# Patient Record
Sex: FEMALE | Race: WHITE | Employment: OTHER | URBAN - METROPOLITAN AREA
[De-identification: names, ages, dates, MRNs, and addresses within clinical notes are randomized per-mention and may not be internally consistent; named-entity substitution may affect disease eponyms.]

---

## 2017-01-10 ENCOUNTER — OFFICE VISIT (OUTPATIENT)
Dept: FAMILY MEDICINE CLINIC | Age: 72
End: 2017-01-10

## 2017-01-10 VITALS
HEIGHT: 63 IN | OXYGEN SATURATION: 96 % | WEIGHT: 143.5 LBS | SYSTOLIC BLOOD PRESSURE: 120 MMHG | HEART RATE: 72 BPM | DIASTOLIC BLOOD PRESSURE: 70 MMHG | BODY MASS INDEX: 25.43 KG/M2

## 2017-01-10 DIAGNOSIS — E55.9 VITAMIN D DEFICIENCY: ICD-10-CM

## 2017-01-10 DIAGNOSIS — F32.A DEPRESSION, UNSPECIFIED DEPRESSION TYPE: ICD-10-CM

## 2017-01-10 DIAGNOSIS — M81.0 OSTEOPOROSIS: ICD-10-CM

## 2017-01-10 DIAGNOSIS — Z00.00 WELL ADULT EXAM: Primary | ICD-10-CM

## 2017-01-10 DIAGNOSIS — K21.9 GASTROESOPHAGEAL REFLUX DISEASE WITHOUT ESOPHAGITIS: ICD-10-CM

## 2017-01-10 LAB
A/G RATIO: 1.9 (ref 1.1–2.2)
ALBUMIN SERPL-MCNC: 4.9 G/DL (ref 3.4–5)
ALP BLD-CCNC: 72 U/L (ref 40–129)
ALT SERPL-CCNC: 21 U/L (ref 10–40)
ANION GAP SERPL CALCULATED.3IONS-SCNC: 16 MMOL/L (ref 3–16)
AST SERPL-CCNC: 20 U/L (ref 15–37)
BASOPHILS ABSOLUTE: 0.1 K/UL (ref 0–0.2)
BASOPHILS RELATIVE PERCENT: 0.9 %
BILIRUB SERPL-MCNC: 0.5 MG/DL (ref 0–1)
BUN BLDV-MCNC: 14 MG/DL (ref 7–20)
CALCIUM SERPL-MCNC: 9.7 MG/DL (ref 8.3–10.6)
CHLORIDE BLD-SCNC: 100 MMOL/L (ref 99–110)
CHOLESTEROL, TOTAL: 197 MG/DL (ref 0–199)
CO2: 27 MMOL/L (ref 21–32)
CREAT SERPL-MCNC: 0.6 MG/DL (ref 0.6–1.2)
EOSINOPHILS ABSOLUTE: 0.1 K/UL (ref 0–0.6)
EOSINOPHILS RELATIVE PERCENT: 1.4 %
GFR AFRICAN AMERICAN: >60
GFR NON-AFRICAN AMERICAN: >60
GLOBULIN: 2.6 G/DL
GLUCOSE BLD-MCNC: 108 MG/DL (ref 70–99)
HCT VFR BLD CALC: 47.7 % (ref 36–48)
HDLC SERPL-MCNC: 50 MG/DL (ref 40–60)
HEMOGLOBIN: 15.7 G/DL (ref 12–16)
LDL CHOLESTEROL CALCULATED: 106 MG/DL
LYMPHOCYTES ABSOLUTE: 2.1 K/UL (ref 1–5.1)
LYMPHOCYTES RELATIVE PERCENT: 23.3 %
MCH RBC QN AUTO: 30.5 PG (ref 26–34)
MCHC RBC AUTO-ENTMCNC: 32.9 G/DL (ref 31–36)
MCV RBC AUTO: 92.8 FL (ref 80–100)
MONOCYTES ABSOLUTE: 0.7 K/UL (ref 0–1.3)
MONOCYTES RELATIVE PERCENT: 7.7 %
NEUTROPHILS ABSOLUTE: 6 K/UL (ref 1.7–7.7)
NEUTROPHILS RELATIVE PERCENT: 66.7 %
PDW BLD-RTO: 12.6 % (ref 12.4–15.4)
PLATELET # BLD: 291 K/UL (ref 135–450)
PMV BLD AUTO: 8.8 FL (ref 5–10.5)
POTASSIUM SERPL-SCNC: 4.4 MMOL/L (ref 3.5–5.1)
RBC # BLD: 5.14 M/UL (ref 4–5.2)
SODIUM BLD-SCNC: 143 MMOL/L (ref 136–145)
TOTAL PROTEIN: 7.5 G/DL (ref 6.4–8.2)
TRIGL SERPL-MCNC: 203 MG/DL (ref 0–150)
VLDLC SERPL CALC-MCNC: 41 MG/DL
WBC # BLD: 9 K/UL (ref 4–11)

## 2017-01-10 PROCEDURE — 93000 ELECTROCARDIOGRAM COMPLETE: CPT | Performed by: FAMILY MEDICINE

## 2017-01-10 PROCEDURE — G0439 PPPS, SUBSEQ VISIT: HCPCS | Performed by: FAMILY MEDICINE

## 2017-01-11 LAB — VITAMIN D 25-HYDROXY: 56.5 NG/ML

## 2017-01-23 ENCOUNTER — HOSPITAL ENCOUNTER (OUTPATIENT)
Dept: GENERAL RADIOLOGY | Age: 72
Discharge: HOME | End: 2017-01-23
Attending: FAMILY MEDICINE | Admitting: FAMILY MEDICINE

## 2017-01-23 DIAGNOSIS — M81.0 AGE-RELATED OSTEOPOROSIS WITHOUT CURRENT PATHOLOGICAL FRACTURE: ICD-10-CM

## 2017-01-23 DIAGNOSIS — M81.0 OSTEOPOROSIS: ICD-10-CM

## 2017-01-25 RX ORDER — ALENDRONATE SODIUM 70 MG/1
70 TABLET ORAL
Qty: 4 TABLET | Refills: 5 | Status: SHIPPED | OUTPATIENT
Start: 2017-01-25 | End: 2017-01-26 | Stop reason: SDUPTHER

## 2017-01-26 ENCOUNTER — TELEPHONE (OUTPATIENT)
Dept: FAMILY MEDICINE CLINIC | Age: 72
End: 2017-01-26

## 2017-01-26 RX ORDER — ALENDRONATE SODIUM 70 MG/1
TABLET ORAL
Qty: 4 TABLET | Refills: 5 | Status: SHIPPED | OUTPATIENT
Start: 2017-01-26 | End: 2019-02-07 | Stop reason: CLARIF

## 2017-05-12 RX ORDER — OMEPRAZOLE 20 MG/1
CAPSULE, DELAYED RELEASE ORAL
Qty: 90 CAPSULE | Refills: 0 | Status: SHIPPED | OUTPATIENT
Start: 2017-05-12 | End: 2017-08-14 | Stop reason: SDUPTHER

## 2017-07-31 ENCOUNTER — OFFICE VISIT (OUTPATIENT)
Dept: SURGERY | Age: 72
End: 2017-07-31

## 2017-07-31 VITALS
BODY MASS INDEX: 25.66 KG/M2 | HEIGHT: 63 IN | DIASTOLIC BLOOD PRESSURE: 64 MMHG | TEMPERATURE: 99.7 F | SYSTOLIC BLOOD PRESSURE: 128 MMHG | OXYGEN SATURATION: 95 % | HEART RATE: 79 BPM | WEIGHT: 144.8 LBS | RESPIRATION RATE: 16 BRPM

## 2017-07-31 DIAGNOSIS — Z41.1 ELECTIVE PROCEDURE FOR UNACCEPTABLE COSMETIC APPEARANCE: Primary | ICD-10-CM

## 2017-07-31 PROCEDURE — 99203 OFFICE O/P NEW LOW 30 MIN: CPT | Performed by: SURGERY

## 2017-07-31 ASSESSMENT — ENCOUNTER SYMPTOMS
BACK PAIN: 0
PHOTOPHOBIA: 0
COUGH: 1
CONSTIPATION: 0
ABDOMINAL PAIN: 0
SHORTNESS OF BREATH: 0
SORE THROAT: 0
BLURRED VISION: 0
VOMITING: 0
NAUSEA: 0
WHEEZING: 0
DIARRHEA: 0
HEARTBURN: 1
DOUBLE VISION: 0

## 2017-08-09 ENCOUNTER — OFFICE VISIT (OUTPATIENT)
Dept: SURGERY | Age: 72
End: 2017-08-09

## 2017-08-09 VITALS
SYSTOLIC BLOOD PRESSURE: 151 MMHG | OXYGEN SATURATION: 95 % | HEIGHT: 63 IN | BODY MASS INDEX: 25.52 KG/M2 | RESPIRATION RATE: 70 BRPM | WEIGHT: 144 LBS | TEMPERATURE: 97.4 F | DIASTOLIC BLOOD PRESSURE: 86 MMHG

## 2017-08-09 DIAGNOSIS — Z41.1 ELECTIVE PROCEDURE FOR UNACCEPTABLE COSMETIC APPEARANCE: Primary | ICD-10-CM

## 2017-08-09 PROCEDURE — 99999 PR OFFICE/OUTPT VISIT,PROCEDURE ONLY: CPT | Performed by: SURGERY

## 2017-08-14 RX ORDER — OMEPRAZOLE 20 MG/1
CAPSULE, DELAYED RELEASE ORAL
Qty: 90 CAPSULE | Refills: 0 | Status: SHIPPED | OUTPATIENT
Start: 2017-08-14 | End: 2017-11-15 | Stop reason: SDUPTHER

## 2017-09-07 ENCOUNTER — HOSPITAL ENCOUNTER (OUTPATIENT)
Dept: MAMMOGRAPHY | Age: 72
Discharge: HOME | End: 2017-09-07
Attending: FAMILY MEDICINE | Admitting: FAMILY MEDICINE

## 2017-09-07 DIAGNOSIS — Z12.31 VISIT FOR SCREENING MAMMOGRAM: ICD-10-CM

## 2017-09-08 ENCOUNTER — HOSPITAL ENCOUNTER (OUTPATIENT)
Dept: MAMMOGRAPHY | Age: 72
Discharge: HOME | End: 2017-09-08

## 2017-09-08 DIAGNOSIS — R92.8 ABNORMAL FINDING ON MAMMOGRAPHY: ICD-10-CM

## 2017-09-08 DIAGNOSIS — R92.8 ABNORMAL MAMMOGRAM: ICD-10-CM

## 2017-09-11 ENCOUNTER — HOSPITAL ENCOUNTER (OUTPATIENT)
Dept: ULTRASOUND IMAGING | Age: 72
Discharge: HOME | End: 2017-09-11
Attending: FAMILY MEDICINE | Admitting: FAMILY MEDICINE

## 2017-09-11 DIAGNOSIS — R92.8 ABNORMAL MAMMOGRAM: ICD-10-CM

## 2017-09-11 DIAGNOSIS — R92.8 ABNORMAL MAMMOGRAPHY: ICD-10-CM

## 2017-09-12 ENCOUNTER — TELEPHONE (OUTPATIENT)
Dept: FAMILY MEDICINE CLINIC | Age: 72
End: 2017-09-12

## 2017-09-13 ENCOUNTER — OFFICE VISIT (OUTPATIENT)
Dept: FAMILY MEDICINE CLINIC | Age: 72
End: 2017-09-13

## 2017-09-13 VITALS
WEIGHT: 145.6 LBS | OXYGEN SATURATION: 95 % | SYSTOLIC BLOOD PRESSURE: 110 MMHG | HEART RATE: 93 BPM | DIASTOLIC BLOOD PRESSURE: 70 MMHG | HEIGHT: 63 IN | BODY MASS INDEX: 25.8 KG/M2

## 2017-09-13 DIAGNOSIS — F32.5 MAJOR DEPRESSION, SINGLE EPISODE, IN COMPLETE REMISSION (HCC): ICD-10-CM

## 2017-09-13 DIAGNOSIS — C50.512 MALIGNANT NEOPLASM OF LOWER-OUTER QUADRANT OF LEFT FEMALE BREAST (HCC): ICD-10-CM

## 2017-09-13 DIAGNOSIS — R05.9 COUGH: ICD-10-CM

## 2017-09-13 PROCEDURE — 99213 OFFICE O/P EST LOW 20 MIN: CPT | Performed by: FAMILY MEDICINE

## 2017-09-13 ASSESSMENT — PATIENT HEALTH QUESTIONNAIRE - PHQ9
SUM OF ALL RESPONSES TO PHQ9 QUESTIONS 1 & 2: 0
SUM OF ALL RESPONSES TO PHQ QUESTIONS 1-9: 0
1. LITTLE INTEREST OR PLEASURE IN DOING THINGS: 0
2. FEELING DOWN, DEPRESSED OR HOPELESS: 0

## 2017-09-13 ASSESSMENT — ENCOUNTER SYMPTOMS
COUGH: 1
RHINORRHEA: 0
HEMOPTYSIS: 0
SHORTNESS OF BREATH: 0
SORE THROAT: 0
WHEEZING: 0
HEARTBURN: 0

## 2017-09-14 ENCOUNTER — HOSPITAL ENCOUNTER (OUTPATIENT)
Dept: OTHER | Age: 72
Discharge: HOME | End: 2017-09-14
Attending: FAMILY MEDICINE | Admitting: FAMILY MEDICINE

## 2017-09-14 DIAGNOSIS — R05.9 COUGH: ICD-10-CM

## 2017-09-15 ENCOUNTER — OFFICE VISIT (OUTPATIENT)
Dept: SURGERY | Age: 72
End: 2017-09-15

## 2017-09-15 VITALS
WEIGHT: 145 LBS | SYSTOLIC BLOOD PRESSURE: 129 MMHG | DIASTOLIC BLOOD PRESSURE: 76 MMHG | BODY MASS INDEX: 25.69 KG/M2 | HEIGHT: 63 IN | HEART RATE: 96 BPM

## 2017-09-15 DIAGNOSIS — Z71.89 ENCOUNTER FOR SURGICAL COUNSELING: ICD-10-CM

## 2017-09-15 DIAGNOSIS — Z98.890 STATUS POST LEFT BREAST BIOPSY: ICD-10-CM

## 2017-09-15 DIAGNOSIS — C50.512 MALIGNANT NEOPLASM OF LOWER-OUTER QUADRANT OF LEFT FEMALE BREAST (HCC): Primary | ICD-10-CM

## 2017-09-15 DIAGNOSIS — Z76.89 ESTABLISHING CARE WITH NEW DOCTOR, ENCOUNTER FOR: ICD-10-CM

## 2017-09-15 PROCEDURE — 99205 OFFICE O/P NEW HI 60 MIN: CPT | Performed by: SURGERY

## 2017-09-15 PROCEDURE — 99354 PR PROLONGED SVC OUTPATIENT SETTING 1ST HOUR: CPT | Performed by: SURGERY

## 2017-09-25 ENCOUNTER — TELEPHONE (OUTPATIENT)
Dept: SURGERY | Age: 72
End: 2017-09-25

## 2017-09-25 ENCOUNTER — HOSPITAL ENCOUNTER (OUTPATIENT)
Dept: MRI IMAGING | Age: 72
Discharge: HOME | End: 2017-09-25
Attending: SURGERY | Admitting: SURGERY

## 2017-09-25 DIAGNOSIS — C50.512 MALIGNANT NEOPLASM OF LOWER-OUTER QUADRANT OF LEFT FEMALE BREAST (HCC): ICD-10-CM

## 2017-10-11 ENCOUNTER — OFFICE VISIT (OUTPATIENT)
Dept: FAMILY MEDICINE CLINIC | Age: 72
End: 2017-10-11

## 2017-10-11 VITALS
SYSTOLIC BLOOD PRESSURE: 130 MMHG | WEIGHT: 146.9 LBS | OXYGEN SATURATION: 95 % | HEART RATE: 113 BPM | BODY MASS INDEX: 26.03 KG/M2 | DIASTOLIC BLOOD PRESSURE: 70 MMHG | HEIGHT: 63 IN

## 2017-10-11 DIAGNOSIS — C50.512 MALIGNANT NEOPLASM OF LOWER-OUTER QUADRANT OF LEFT FEMALE BREAST (HCC): ICD-10-CM

## 2017-10-11 DIAGNOSIS — Z01.818 PRE-OP EXAM: ICD-10-CM

## 2017-10-11 LAB
ANION GAP SERPL CALCULATED.3IONS-SCNC: 15 MMOL/L (ref 3–16)
BASOPHILS ABSOLUTE: 0.1 K/UL (ref 0–0.2)
BASOPHILS RELATIVE PERCENT: 0.8 %
BUN BLDV-MCNC: 15 MG/DL (ref 7–20)
CALCIUM SERPL-MCNC: 9.8 MG/DL (ref 8.3–10.6)
CHLORIDE BLD-SCNC: 101 MMOL/L (ref 99–110)
CO2: 27 MMOL/L (ref 21–32)
CREAT SERPL-MCNC: 0.8 MG/DL (ref 0.6–1.2)
EOSINOPHILS ABSOLUTE: 0.2 K/UL (ref 0–0.6)
EOSINOPHILS RELATIVE PERCENT: 1.6 %
GFR AFRICAN AMERICAN: >60
GFR NON-AFRICAN AMERICAN: >60
GLUCOSE BLD-MCNC: 88 MG/DL (ref 70–99)
HCT VFR BLD CALC: 44.1 % (ref 36–48)
HEMOGLOBIN: 14.9 G/DL (ref 12–16)
LYMPHOCYTES ABSOLUTE: 2.8 K/UL (ref 1–5.1)
LYMPHOCYTES RELATIVE PERCENT: 26.8 %
MCH RBC QN AUTO: 31.2 PG (ref 26–34)
MCHC RBC AUTO-ENTMCNC: 33.9 G/DL (ref 31–36)
MCV RBC AUTO: 92.1 FL (ref 80–100)
MONOCYTES ABSOLUTE: 0.9 K/UL (ref 0–1.3)
MONOCYTES RELATIVE PERCENT: 9.1 %
NEUTROPHILS ABSOLUTE: 6.4 K/UL (ref 1.7–7.7)
NEUTROPHILS RELATIVE PERCENT: 61.7 %
PDW BLD-RTO: 12.5 % (ref 12.4–15.4)
PLATELET # BLD: 276 K/UL (ref 135–450)
PMV BLD AUTO: 8.3 FL (ref 5–10.5)
POTASSIUM SERPL-SCNC: 4.4 MMOL/L (ref 3.5–5.1)
RBC # BLD: 4.78 M/UL (ref 4–5.2)
SODIUM BLD-SCNC: 143 MMOL/L (ref 136–145)
WBC # BLD: 10.4 K/UL (ref 4–11)

## 2017-10-11 PROCEDURE — 93000 ELECTROCARDIOGRAM COMPLETE: CPT | Performed by: FAMILY MEDICINE

## 2017-10-11 PROCEDURE — 99214 OFFICE O/P EST MOD 30 MIN: CPT | Performed by: FAMILY MEDICINE

## 2017-10-11 NOTE — PROGRESS NOTES
Subjective:      Era Olivas is a 74 y.o. female who presents to the office today for a preoperative consultation at the request of surgeon Dr Dakota Pastor who plans on performing lumpectomy on October 26. This consultation is requested for the specific conditions prompting preoperative evaluation (i.e. because of potential affect on operative risk): none. Planned anesthesia is General.  The patient and family have no known anesthesia issues nor bleeding risks.    Past Medical History:   Diagnosis Date    Vernon's esophagus     Colon polyp 1/15/2016    Gallstones     GERD (gastroesophageal reflux disease)     Hypertension     Osteoporoses     Pregnancies     2- vaginal deliveries- 2    Tubular adenoma nos      Patient Active Problem List    Diagnosis Date Noted    Pre-op exam 10/11/2017    Malignant neoplasm of lower-outer quadrant of left female breast ([address] 09/13/2017    Cough 09/13/2017    Depression 11/18/2016    Well adult exam 01/15/2016    Colon polyp 01/15/2016    Lumbar radiculopathy 11/06/2012    Osteoporosis 09/30/2011    GERD (gastroesophageal reflux disease) 10/14/2010    Vernon esophagus 10/14/2010    Balance disorder 10/14/2010     Past Surgical History:   Procedure Laterality Date    APPENDECTOMY      BLADDER REPAIR  9/09    cah    CHOLECYSTECTOMY  1988    COLONOSCOPY  2008    polyp    HYSTERECTOMY      UPPER GASTROINTESTINAL ENDOSCOPY  2008     Family History   Problem Relation Age of Onset    Breast Cancer Mother     Coronary Art Dis Mother     Cirrhosis Mother     Cancer Mother 67     breast    Heart Disease Mother     Other Father      cva    Stroke Father     Coronary Art Dis Sister     Elevated Lipids Sister     Other Sister      CABG    High Blood Pressure Sister     High Cholesterol Sister     Heart Disease Sister     High Blood Pressure Sister     Cancer Sister 61     breast     Social History     Social History    Marital status:  Spouse name: N/A    Number of children: 2    Years of education: N/A     Occupational History    retired teacher's aid      Social History Main Topics    Smoking status: Never Smoker    Smokeless tobacco: Never Used    Alcohol use 0.6 oz/week     1 Glasses of wine per week      Comment: occasional    Drug use: No    Sexual activity: Yes     Partners: Male     Other Topics Concern    None     Social History Narrative    Exercise 3-4x weekly    Happily --both kids out of town--7 grandchildren    + seatbelts    [place]--sews and reads     Current Outpatient Prescriptions   Medication Sig Dispense Refill    omeprazole (PRILOSEC) 20 MG delayed release capsule TAKE ONE CAPSULE BY MOUTH ONCE DAILY 90 capsule 0    sertraline (ZOLOFT) 50 MG tablet TAKE ONE TABLET BY MOUTH ONCE DAILY 30 tablet 5    Psyllium (METAMUCIL PO) Take by mouth      calcium carbonate 600 MG TABS tablet Take 1 tablet by mouth daily      Potassium Gluconate 595 MG CAPS Take by mouth      Cholecalciferol (VITAMIN D-3) 1000 UNITS CAPS Take by mouth      Ascorbic Acid (VITAMIN C) 250 MG tablet Take 250 mg by mouth daily.  aspirin 81 MG tablet Take 81 mg by mouth daily.  Multiple Vitamin (MULTIVITAMIN PO) Take  by mouth.  alendronate (FOSAMAX) 70 MG tablet Take with water on an empty stomach- wait 30 minutes before eating or taking other meds. Avoid lying down for 30 minutes after dose. 4 tablet 5     No current facility-administered medications for this visit.       Current Outpatient Prescriptions on File Prior to Visit   Medication Sig Dispense Refill    omeprazole (PRILOSEC) 20 MG delayed release capsule TAKE ONE CAPSULE BY MOUTH ONCE DAILY 90 capsule 0    sertraline (ZOLOFT) 50 MG tablet TAKE ONE TABLET BY MOUTH ONCE DAILY 30 tablet 5    Psyllium (METAMUCIL PO) Take by mouth      calcium carbonate 600 MG TABS tablet Take 1 tablet by mouth daily      Potassium Gluconate 595 MG CAPS Take by mouth      Cholecalciferol (VITAMIN D-3) 1000 UNITS CAPS Take by mouth      Ascorbic Acid (VITAMIN C) 250 MG tablet Take 250 mg by mouth daily.  aspirin 81 MG tablet Take 81 mg by mouth daily.  Multiple Vitamin (MULTIVITAMIN PO) Take  by mouth.  alendronate (FOSAMAX) 70 MG tablet Take with water on an empty stomach- wait 30 minutes before eating or taking other meds. Avoid lying down for 30 minutes after dose. 4 tablet 5     No current facility-administered medications on file prior to visit.       Allergies   Allergen Reactions    Sulfa Antibiotics     Sulfamethoxazole-Trimethoprim Other (See Comments)    Vicodin [Hydrocodone-Acetaminophen] Nausea And Vomiting     Review of Systems  Constitutional: negative  Eyes: negative  Ears, nose, mouth, throat, and face: negative  Respiratory: negative  Cardiovascular: negative  Gastrointestinal: negative  Genitourinary:negative  Integument/breast: negative  Hematologic/lymphatic: negative  Musculoskeletal:negative  Neurological: negative  Behavioral/Psych: negative  Endocrine: negative  Allergic/Immunologic: negative      Objective:      /70   Pulse 113   Ht 5' 3\" (1.6 m)   Wt 146 lb 14.4 oz (66.6 kg)   SpO2 95%   BMI 26.02 kg/m²     General Appearance:  Alert, cooperative, no distress, appears stated age   Head:  Normocephalic, without obvious abnormality, atraumatic   Eyes:  PERRL, conjunctiva/corneas clear, EOM's intact, fundi benign, both eyes   Ears:  Normal TM's and external ear canals, both ears   Nose: Nares normal, septum midline,mucosa normal, no drainage or sinus tenderness   Throat: Lips, mucosa, and tongue normal; teeth and gums normal   Neck: Supple, symmetrical, trachea midline, no adenopathy;  thyroid: not enlarged, symmetric, no tenderness/mass/nodules; no carotid bruit or JVD   Back:   Symmetric, no curvature, ROM normal, no CVA tenderness   Lungs:   Clear to auscultation bilaterally, respirations unlabored   Breasts:  No masses or tenderness   Heart:  Regular rate and rhythm, S1 and S2 normal, no murmur, rub, or gallop   Abdomen:   Soft, non-tender, bowel sounds active all four quadrants,  no masses, no organomegaly   Pelvic: Deferred   Extremities: Extremities normal, atraumatic, no cyanosis or edema   Pulses: 2+ and symmetric   Skin: Skin color, texture, turgor normal, no rashes or lesions   Lymph nodes: Cervical, supraclavicular, and axillary nodes normal   Neurologic: Normal           Predictors of intubation difficulty:   Morbid obesity? no   Anatomically abnormal facies? no   Prominent incisors? no   Receding mandible? no   Short, thick neck? no   Neck range of motion: normal   Mallampati score: I (soft palate, uvula, fauces, tonsillar pillars visible)   Thyromental distance: < 6cm   Mouth openincm   Dentition: No chipped, loose, or missing teeth. Cardiographics  ECG: normal sinus rhythm, no blocks or conduction defects, no ischemic changes  Echocardiogram: not done        Lab Review   No visits with results within 2 Month(s) from this visit.    Latest known visit with results is:   Office Visit on 01/10/2017   Component Date Value    Cholesterol, Total 01/10/2017 197     Triglycerides 01/10/2017 203*    HDL 01/10/2017 50     LDL Calculated 01/10/2017 106*    VLDL CHOLESTEROL CALCULA* 01/10/2017 41     Sodium 01/10/2017 143     Potassium 01/10/2017 4.4     Chloride 01/10/2017 100     CO2 01/10/2017 27     Anion Gap 01/10/2017 16     Glucose 01/10/2017 108*    BUN 01/10/2017 14     CREATININE 01/10/2017 0.6     GFR Non- 01/10/2017 >60     GFR  01/10/2017 >60     Calcium 01/10/2017 9.7     Total Protein 01/10/2017 7.5     Alb 01/10/2017 4.9     Albumin/Globulin Ratio 01/10/2017 1.9     Total Bilirubin 01/10/2017 0.5     Alkaline Phosphatase 01/10/2017 72     ALT 01/10/2017 21     AST 01/10/2017 20     Globulin 01/10/2017 2.6     WBC 01/10/2017 9.0     RBC 01/10/2017 5.14     Hemoglobin 01/10/2017 15.7     Hematocrit 01/10/2017 47.7     MCV 01/10/2017 92.8     MCH 01/10/2017 30.5     MCHC 01/10/2017 32.9     RDW 01/10/2017 12.6     Platelets 35/96/6312 291     MPV 01/10/2017 8.8     Neutrophils % 01/10/2017 66.7     Lymphocytes % 01/10/2017 23.3     Monocytes % 01/10/2017 7.7     Eosinophils % 01/10/2017 1.4     Basophils % 01/10/2017 0.9     Neutrophils # 01/10/2017 6.0     Lymphocytes # 01/10/2017 2.1     Monocytes # 01/10/2017 0.7     Eosinophils # 01/10/2017 0.1     Basophils # 01/10/2017 0.1     Vit D, 25-Hydroxy 01/11/2017 56.5          Assessment:        74 y.o. female with planned surgery as above. Known risk factors for perioperative complications: None    Difficulty with intubation is not anticipated. Cardiac Risk Estimation: per the Revised Cardiac Risk Index -low risk    Current medications which may produce withdrawal symptoms if withheld perioperatively: none       Plan:      1. Preoperative workup as follows none  2. Change in medication regimen before surgery: none, continue med regimen including morning of surgery, w/sip of water  3. Prophylaxis for cardiac events with perioperative beta-blockers: not indicated  4. Invasive hemodynamic monitoring perioperatively: not indicated  5.  Deep vein thrombosis prophylaxis postoperatively:regimen to be chosen by surgical team

## 2017-10-12 ENCOUNTER — TELEPHONE (OUTPATIENT)
Dept: SURGERY | Age: 72
End: 2017-10-12

## 2017-10-24 ENCOUNTER — HOSPITAL ENCOUNTER (OUTPATIENT)
Dept: PREADMISSION TESTING | Age: 72
Discharge: HOME | End: 2017-10-24
Admitting: SURGERY

## 2017-10-26 ENCOUNTER — HOSPITAL ENCOUNTER (OUTPATIENT)
Dept: MAMMOGRAPHY | Age: 72
Discharge: HOME | End: 2017-10-26

## 2017-10-26 DIAGNOSIS — R92.8 ABNORMAL FINDING ON MAMMOGRAPHY: ICD-10-CM

## 2017-10-26 DIAGNOSIS — C50.912 MALIGNANT NEOPLASM OF LEFT FEMALE BREAST, UNSPECIFIED ESTROGEN RECEPTOR STATUS, UNSPECIFIED SITE OF BREAST (HCC): ICD-10-CM

## 2017-10-26 RX ADMIN — Medication 800 MICRO CURIE: at 08:04

## 2017-10-30 ENCOUNTER — TELEPHONE (OUTPATIENT)
Dept: SURGERY | Age: 72
End: 2017-10-30

## 2017-11-14 ENCOUNTER — OFFICE VISIT (OUTPATIENT)
Dept: SURGERY | Age: 72
End: 2017-11-14

## 2017-11-14 VITALS
BODY MASS INDEX: 25.87 KG/M2 | HEIGHT: 63 IN | SYSTOLIC BLOOD PRESSURE: 151 MMHG | WEIGHT: 146 LBS | DIASTOLIC BLOOD PRESSURE: 76 MMHG | HEART RATE: 77 BPM

## 2017-11-14 DIAGNOSIS — Z09 FOLLOW-UP SURGERY CARE: Primary | ICD-10-CM

## 2017-11-14 DIAGNOSIS — C50.412 MALIGNANT NEOPLASM OF UPPER-OUTER QUADRANT OF LEFT BREAST IN FEMALE, ESTROGEN RECEPTOR POSITIVE (HCC): ICD-10-CM

## 2017-11-14 DIAGNOSIS — Z17.0 MALIGNANT NEOPLASM OF UPPER-OUTER QUADRANT OF LEFT BREAST IN FEMALE, ESTROGEN RECEPTOR POSITIVE (HCC): ICD-10-CM

## 2017-11-14 DIAGNOSIS — Z90.12 STATUS POST PARTIAL MASTECTOMY OF LEFT BREAST: ICD-10-CM

## 2017-11-14 PROCEDURE — 1090F PRES/ABSN URINE INCON ASSESS: CPT | Performed by: SURGERY

## 2017-11-14 PROCEDURE — G8419 CALC BMI OUT NRM PARAM NOF/U: HCPCS | Performed by: SURGERY

## 2017-11-14 PROCEDURE — 99024 POSTOP FOLLOW-UP VISIT: CPT | Performed by: SURGERY

## 2017-11-14 PROCEDURE — G8399 PT W/DXA RESULTS DOCUMENT: HCPCS | Performed by: SURGERY

## 2017-11-14 PROCEDURE — 4040F PNEUMOC VAC/ADMIN/RCVD: CPT | Performed by: SURGERY

## 2017-11-14 PROCEDURE — 3014F SCREEN MAMMO DOC REV: CPT | Performed by: SURGERY

## 2017-11-14 PROCEDURE — 3017F COLORECTAL CA SCREEN DOC REV: CPT | Performed by: SURGERY

## 2017-11-14 PROCEDURE — G8427 DOCREV CUR MEDS BY ELIG CLIN: HCPCS | Performed by: SURGERY

## 2017-11-14 PROCEDURE — 1123F ACP DISCUSS/DSCN MKR DOCD: CPT | Performed by: SURGERY

## 2017-11-14 PROCEDURE — G8484 FLU IMMUNIZE NO ADMIN: HCPCS | Performed by: SURGERY

## 2017-11-14 PROCEDURE — 1036F TOBACCO NON-USER: CPT | Performed by: SURGERY

## 2017-11-14 NOTE — PROGRESS NOTES
motion. Good  and upper extremity muscle strength      Assessment:   1. Status post left breast needle localized partial mastectomy, sentinel lymph node biopsy and Biozorb placement 10/26/17  2. Stage 1 (T1, N0, Mx) left breast invasive ductal carcinoma, NG 2, ER +, [place]+, [id number] -  3. Status post left breast ultrasound guided core biopsy 9/11/17  4. Family history of breast cancer, first degree relative (sister, mother) and non first degree relative (maternal grandmother and maternal cousin)      Plan:  1. The patient's pathology was reviewed and all questions were answered. She was given a copy of the report. 2.  Return to clinic three months, sooner if needed  3. Left mammogram six months post op  4. Post operative upper extremity restrictions discussed with patient, she was given a handout. 5. Post operative exercise handout was given to the patient  6. Upper extremity measurements were taken and documented  7. Patient will most likely require adjuvant radiation therapy  8. Patient will require adjuvant endocrine therapy  9. Continue annual screening mammograms  10. Continue monthly self exams    A total of 10 minutes was spent face to face/involved with this patient, Greater than 50% was spent counseling, discussing imaging findings/report, reviewing her chart and answering her questions.

## 2017-11-14 NOTE — PATIENT INSTRUCTIONS
Patient Education        Breast Self-Exam: Care Instructions  Your Care Instructions  A breast self-exam is when you check your breasts for lumps or changes. This regular exam helps you learn how your breasts normally look and feel. Most breast problems or changes are not because of cancer. Breast self-exam is not a substitute for a mammogram. Having regular breast exams by your doctor and regular mammograms improve your chances of finding any problems with your breasts. Some women set a time each month to do a step-by-step breast self-exam. Other women like a less formal system. They might look at their breasts as they brush their teeth, or feel their breasts once in a while in the shower. If you notice a change in your breast, tell your doctor. Follow-up care is a key part of your treatment and safety. Be sure to make and go to all appointments, and call your doctor if you are having problems. Its also a good idea to know your test results and keep a list of the medicines you take. How do you do a breast self-exam?  · The best time to examine your breasts is usually one week after your menstrual period begins. Your breasts should not be tender then. If you do not have periods, you might do your exam on a day of the month that is easy to remember. · To examine your breasts:  ¨ Remove all your clothes above the waist and lie down. When you are lying down, your breast tissue spreads evenly over your chest wall, which makes it easier to feel all your breast tissue. ¨ Use the padsnot the fingertipsof the 3 middle fingers of your left hand to check your right breast. Move your fingers slowly in small coin-sized circles that overlap. ¨ Use three levels of pressure to feel of all your breast tissue. Use light pressure to feel the tissue close to the skin surface. Use medium pressure to feel a little deeper. Use firm pressure to feel your tissue close to your breastbone and ribs.  Use each pressure level to feel your breast tissue before moving on to the next spot. ¨ Check your entire breast, moving up and down as if following a strip from the collarbone to the bra line, and from the armpit to the ribs. Repeat until you have covered the entire breast.  ¨ Repeat this procedure for your left breast, using the pads of the 3 middle fingers of your right hand. · To examine your breasts while in the shower:  ¨ Place one arm over your head and lightly soap your breast on that side. ¨ Using the pads of your fingers, gently move your hand over your breast (in the strip pattern described above), feeling carefully for any lumps or changes. ¨ Repeat for the other breast.  · Have your doctor inspect anything you notice to see if you need further testing. Where can you learn more? Go to [email or web address] and sign in to your "[employer] account. Enter P148 in the [employer] box to learn more about \"Breast Self-Exam: Care Instructions. \"     If you do not have an account, please click on the \"Sign Up Now\" link. Current as of: July 26, 2016  Content Version: 11.3  © 6841-1521 [email or web address]. Care instructions adapted under license by [facility] ([facility]). If you have questions about a medical condition or this instruction, always ask your healthcare professional. [address] any warranty or liability for your use of this information.

## 2017-11-15 RX ORDER — OMEPRAZOLE 20 MG/1
CAPSULE, DELAYED RELEASE ORAL
Qty: 90 CAPSULE | Refills: 0 | Status: SHIPPED | OUTPATIENT
Start: 2017-11-15 | End: 2018-02-14 | Stop reason: SDUPTHER

## 2018-01-09 ENCOUNTER — TELEPHONE (OUTPATIENT)
Dept: FAMILY MEDICINE CLINIC | Age: 73
End: 2018-01-09

## 2018-01-09 ENCOUNTER — HOSPITAL ENCOUNTER (OUTPATIENT)
Dept: MRI IMAGING | Age: 73
Discharge: HOME | End: 2018-01-09
Attending: OPHTHALMOLOGY | Admitting: OPHTHALMOLOGY

## 2018-01-09 DIAGNOSIS — H53.461 HOMONYMOUS BILATERAL FIELD DEFECTS OF RIGHT SIDE: ICD-10-CM

## 2018-01-09 DIAGNOSIS — H53.451 PERIPHERAL VISUAL FIELD DEFECT OF RIGHT EYE: ICD-10-CM

## 2018-01-10 ENCOUNTER — OFFICE VISIT (OUTPATIENT)
Dept: FAMILY MEDICINE CLINIC | Age: 73
End: 2018-01-10

## 2018-01-10 ENCOUNTER — TELEPHONE (OUTPATIENT)
Dept: FAMILY MEDICINE CLINIC | Age: 73
End: 2018-01-10

## 2018-01-10 VITALS
SYSTOLIC BLOOD PRESSURE: 110 MMHG | HEART RATE: 87 BPM | DIASTOLIC BLOOD PRESSURE: 70 MMHG | HEIGHT: 63 IN | WEIGHT: 148.6 LBS | OXYGEN SATURATION: 95 % | BODY MASS INDEX: 26.33 KG/M2

## 2018-01-10 DIAGNOSIS — F32.5 MAJOR DEPRESSION, SINGLE EPISODE, IN COMPLETE REMISSION (HCC): ICD-10-CM

## 2018-01-10 DIAGNOSIS — I63.439 CEREBROVASCULAR ACCIDENT (CVA) DUE TO EMBOLISM OF POSTERIOR CEREBRAL ARTERY WITH INFARCTIONS OF BOTH OCCIPITAL LOBES (HCC): Primary | ICD-10-CM

## 2018-01-10 DIAGNOSIS — F32.A DEPRESSION, UNSPECIFIED DEPRESSION TYPE: ICD-10-CM

## 2018-01-10 PROCEDURE — G8399 PT W/DXA RESULTS DOCUMENT: HCPCS | Performed by: FAMILY MEDICINE

## 2018-01-10 PROCEDURE — 3017F COLORECTAL CA SCREEN DOC REV: CPT | Performed by: FAMILY MEDICINE

## 2018-01-10 PROCEDURE — 3014F SCREEN MAMMO DOC REV: CPT | Performed by: FAMILY MEDICINE

## 2018-01-10 PROCEDURE — 4040F PNEUMOC VAC/ADMIN/RCVD: CPT | Performed by: FAMILY MEDICINE

## 2018-01-10 PROCEDURE — 1123F ACP DISCUSS/DSCN MKR DOCD: CPT | Performed by: FAMILY MEDICINE

## 2018-01-10 PROCEDURE — G8427 DOCREV CUR MEDS BY ELIG CLIN: HCPCS | Performed by: FAMILY MEDICINE

## 2018-01-10 PROCEDURE — 99213 OFFICE O/P EST LOW 20 MIN: CPT | Performed by: FAMILY MEDICINE

## 2018-01-10 PROCEDURE — G8598 ASA/ANTIPLAT THER USED: HCPCS | Performed by: FAMILY MEDICINE

## 2018-01-10 PROCEDURE — 1036F TOBACCO NON-USER: CPT | Performed by: FAMILY MEDICINE

## 2018-01-10 PROCEDURE — G8484 FLU IMMUNIZE NO ADMIN: HCPCS | Performed by: FAMILY MEDICINE

## 2018-01-10 PROCEDURE — G8419 CALC BMI OUT NRM PARAM NOF/U: HCPCS | Performed by: FAMILY MEDICINE

## 2018-01-10 PROCEDURE — 1090F PRES/ABSN URINE INCON ASSESS: CPT | Performed by: FAMILY MEDICINE

## 2018-01-10 ASSESSMENT — ENCOUNTER SYMPTOMS
VISUAL CHANGE: 1
BACK PAIN: 0
SHORTNESS OF BREATH: 0
ABDOMINAL PAIN: 0
NAUSEA: 0
BOWEL INCONTINENCE: 0
VOMITING: 0

## 2018-01-10 NOTE — TELEPHONE ENCOUNTER
They need a working ICD10 Code for all 3 test.    · Echo Complete With Bubble Study  · Holter Monitor 24 Hour  · Ultrasound Carotid Doppler      Please advise  Central Scheduling Yao Villegas [phone]

## 2018-01-15 ENCOUNTER — HOSPITAL ENCOUNTER (OUTPATIENT)
Dept: NON INVASIVE DIAGNOSTICS | Age: 73
Discharge: HOME | End: 2018-01-15
Attending: FAMILY MEDICINE | Admitting: FAMILY MEDICINE

## 2018-01-15 DIAGNOSIS — I63.439: ICD-10-CM

## 2018-01-15 DIAGNOSIS — I63.439 CEREBROVASCULAR ACCIDENT (CVA) DUE TO EMBOLISM OF POSTERIOR CEREBRAL ARTERY WITH INFARCTIONS OF BOTH OCCIPITAL LOBES (HCC): Primary | ICD-10-CM

## 2018-01-15 LAB
LV EF: 58 %
LVEF MODALITY: NORMAL

## 2018-01-18 DIAGNOSIS — I63.439 CEREBROVASCULAR ACCIDENT (CVA) DUE TO EMBOLISM OF POSTERIOR CEREBRAL ARTERY WITH INFARCTIONS OF BOTH OCCIPITAL LOBES (HCC): Primary | ICD-10-CM

## 2018-01-22 ENCOUNTER — HOSPITAL ENCOUNTER (OUTPATIENT)
Dept: NON INVASIVE DIAGNOSTICS | Age: 73
Discharge: HOME | End: 2018-01-22
Attending: FAMILY MEDICINE | Admitting: FAMILY MEDICINE

## 2018-01-22 DIAGNOSIS — I63.439: ICD-10-CM

## 2018-01-24 ENCOUNTER — TELEPHONE (OUTPATIENT)
Dept: FAMILY MEDICINE CLINIC | Age: 73
End: 2018-01-24

## 2018-01-24 LAB
ACQUISITION DURATION: NORMAL S
AVERAGE HEART RATE: 76 BPM
EKG DIAGNOSIS: NORMAL
FASTEST SUPRAVENTRICULAR RATE: 176 BPM
HOLTER MAX HEART RATE: 131 BPM
HOOKUP DATE: NORMAL
HOOKUP TIME: NORMAL
LONGEST SUPRAVENTRICULAR RATE: 176 BPM
Lab: NORMAL
MAX HEART RATE TIME/DATE: NORMAL
MIN HEART RATE TIME/DATE: NORMAL
MIN HEART RATE: 58 BPM
NUMBER OF FASTEST SUPRAVENTRICULAR BEATS: 3
NUMBER OF LONGEST SUPRAVENTRICULAR BEATS: 3
NUMBER OF QRS COMPLEXES: NORMAL
NUMBER OF SUPRAVENTRICULAR BEATS IN RUNS: 3
NUMBER OF SUPRAVENTRICULAR COUPLETS: 0
NUMBER OF SUPRAVENTRICULAR ECTOPICS: 10
NUMBER OF SUPRAVENTRICULAR ISOLATED BEATS: 7
NUMBER OF SUPRAVENTRICULAR RUNS: 1
NUMBER OF VENTRICULAR BEATS IN RUNS: 0
NUMBER OF VENTRICULAR BIGEMINAL CYCLES: 0
NUMBER OF VENTRICULAR COUPLETS: 0
NUMBER OF VENTRICULAR ECTOPICS: 0
NUMBER OF VENTRICULAR ISOLATED BEATS: 0
NUMBER OF VENTRICULAR RUNS: 0

## 2018-01-24 NOTE — TELEPHONE ENCOUNTER
Pt states that Luci scheduled her for a Ct angiogram scan of head for Friday, but she has yet to receive a pre-reg call. If you could let her know if she really does have this appointment scheduled. She does want to get there an not have an appointment.     Please advise  Puma Hudson [phone]

## 2018-01-24 NOTE — TELEPHONE ENCOUNTER
Called and advised that pt is indeed scheduled for Friday.  Gave pt scheduling number to call and confirm

## 2018-01-26 ENCOUNTER — HOSPITAL ENCOUNTER (OUTPATIENT)
Dept: CT IMAGING | Age: 73
Discharge: HOME | End: 2018-01-26
Attending: FAMILY MEDICINE | Admitting: FAMILY MEDICINE

## 2018-01-26 DIAGNOSIS — I63.439: ICD-10-CM

## 2018-01-26 DIAGNOSIS — I63.439 CEREBROVASCULAR ACCIDENT (CVA) DUE TO EMBOLISM OF POSTERIOR CEREBRAL ARTERY WITH INFARCTIONS OF BOTH OCCIPITAL LOBES (HCC): ICD-10-CM

## 2018-01-29 ENCOUNTER — OFFICE VISIT (OUTPATIENT)
Dept: FAMILY MEDICINE CLINIC | Age: 73
End: 2018-01-29

## 2018-01-29 VITALS
BODY MASS INDEX: 26.39 KG/M2 | WEIGHT: 149 LBS | DIASTOLIC BLOOD PRESSURE: 62 MMHG | SYSTOLIC BLOOD PRESSURE: 130 MMHG | HEART RATE: 81 BPM | OXYGEN SATURATION: 96 %

## 2018-01-29 DIAGNOSIS — I63.439 CEREBROVASCULAR ACCIDENT (CVA) DUE TO EMBOLISM OF POSTERIOR CEREBRAL ARTERY WITH INFARCTIONS OF BOTH OCCIPITAL LOBES (HCC): ICD-10-CM

## 2018-01-29 PROCEDURE — 4040F PNEUMOC VAC/ADMIN/RCVD: CPT | Performed by: FAMILY MEDICINE

## 2018-01-29 PROCEDURE — G8427 DOCREV CUR MEDS BY ELIG CLIN: HCPCS | Performed by: FAMILY MEDICINE

## 2018-01-29 PROCEDURE — 3014F SCREEN MAMMO DOC REV: CPT | Performed by: FAMILY MEDICINE

## 2018-01-29 PROCEDURE — G8598 ASA/ANTIPLAT THER USED: HCPCS | Performed by: FAMILY MEDICINE

## 2018-01-29 PROCEDURE — 99213 OFFICE O/P EST LOW 20 MIN: CPT | Performed by: FAMILY MEDICINE

## 2018-01-29 PROCEDURE — G8399 PT W/DXA RESULTS DOCUMENT: HCPCS | Performed by: FAMILY MEDICINE

## 2018-01-29 PROCEDURE — G8484 FLU IMMUNIZE NO ADMIN: HCPCS | Performed by: FAMILY MEDICINE

## 2018-01-29 PROCEDURE — 3017F COLORECTAL CA SCREEN DOC REV: CPT | Performed by: FAMILY MEDICINE

## 2018-01-29 PROCEDURE — 1090F PRES/ABSN URINE INCON ASSESS: CPT | Performed by: FAMILY MEDICINE

## 2018-01-29 PROCEDURE — G8419 CALC BMI OUT NRM PARAM NOF/U: HCPCS | Performed by: FAMILY MEDICINE

## 2018-01-29 PROCEDURE — 1123F ACP DISCUSS/DSCN MKR DOCD: CPT | Performed by: FAMILY MEDICINE

## 2018-01-29 PROCEDURE — 1036F TOBACCO NON-USER: CPT | Performed by: FAMILY MEDICINE

## 2018-01-29 ASSESSMENT — ENCOUNTER SYMPTOMS
BACK PAIN: 0
VOMITING: 0
ABDOMINAL PAIN: 0
NAUSEA: 0
VISUAL CHANGE: 1
BOWEL INCONTINENCE: 0
SHORTNESS OF BREATH: 0

## 2018-01-30 ENCOUNTER — TELEPHONE (OUTPATIENT)
Dept: FAMILY MEDICINE CLINIC | Age: 73
End: 2018-01-30

## 2018-01-30 NOTE — TELEPHONE ENCOUNTER
Patient is calling stating that she was advised to call back to our office if she is having any problems since her stroke last month. Patient states that she is having a pounding headache only in one particular section she states that the pain is in the lower left side of her head she has never had anything like this before. Per Dr Breann aMrt advised patient to go the the ER. Patient is on her was to [facility] She is taking otc tylenol and nothing is helping. Patients  will keep us updated.   ULISES

## 2018-02-14 RX ORDER — OMEPRAZOLE 20 MG/1
CAPSULE, DELAYED RELEASE ORAL
Qty: 90 CAPSULE | Refills: 0 | Status: SHIPPED | OUTPATIENT
Start: 2018-02-14 | End: 2018-05-17 | Stop reason: SDUPTHER

## 2018-02-20 ENCOUNTER — OFFICE VISIT (OUTPATIENT)
Dept: SURGERY | Age: 73
End: 2018-02-20

## 2018-02-20 VITALS
SYSTOLIC BLOOD PRESSURE: 133 MMHG | DIASTOLIC BLOOD PRESSURE: 81 MMHG | WEIGHT: 146 LBS | HEIGHT: 63 IN | HEART RATE: 73 BPM | BODY MASS INDEX: 25.87 KG/M2

## 2018-02-20 DIAGNOSIS — Z90.12 HISTORY OF PARTIAL MASTECTOMY, LEFT: ICD-10-CM

## 2018-02-20 DIAGNOSIS — Z17.0 MALIGNANT NEOPLASM OF LOWER-OUTER QUADRANT OF LEFT BREAST OF FEMALE, ESTROGEN RECEPTOR POSITIVE (HCC): Primary | ICD-10-CM

## 2018-02-20 DIAGNOSIS — C50.512 MALIGNANT NEOPLASM OF LOWER-OUTER QUADRANT OF LEFT BREAST OF FEMALE, ESTROGEN RECEPTOR POSITIVE (HCC): Primary | ICD-10-CM

## 2018-02-20 PROCEDURE — 4040F PNEUMOC VAC/ADMIN/RCVD: CPT | Performed by: SURGERY

## 2018-02-20 PROCEDURE — 1123F ACP DISCUSS/DSCN MKR DOCD: CPT | Performed by: SURGERY

## 2018-02-20 PROCEDURE — G8419 CALC BMI OUT NRM PARAM NOF/U: HCPCS | Performed by: SURGERY

## 2018-02-20 PROCEDURE — 1090F PRES/ABSN URINE INCON ASSESS: CPT | Performed by: SURGERY

## 2018-02-20 PROCEDURE — G8399 PT W/DXA RESULTS DOCUMENT: HCPCS | Performed by: SURGERY

## 2018-02-20 PROCEDURE — 3017F COLORECTAL CA SCREEN DOC REV: CPT | Performed by: SURGERY

## 2018-02-20 PROCEDURE — G8427 DOCREV CUR MEDS BY ELIG CLIN: HCPCS | Performed by: SURGERY

## 2018-02-20 PROCEDURE — G8598 ASA/ANTIPLAT THER USED: HCPCS | Performed by: SURGERY

## 2018-02-20 PROCEDURE — G8484 FLU IMMUNIZE NO ADMIN: HCPCS | Performed by: SURGERY

## 2018-02-20 PROCEDURE — 3014F SCREEN MAMMO DOC REV: CPT | Performed by: SURGERY

## 2018-02-20 PROCEDURE — 99213 OFFICE O/P EST LOW 20 MIN: CPT | Performed by: SURGERY

## 2018-02-20 PROCEDURE — 1036F TOBACCO NON-USER: CPT | Performed by: SURGERY

## 2018-02-20 RX ORDER — ANASTROZOLE 1 MG/1
1 TABLET ORAL DAILY
COMMUNITY
Start: 2018-01-19 | End: 2019-03-19 | Stop reason: ALTCHOICE

## 2018-02-20 NOTE — PROGRESS NOTES
Patient Name: Calli Vasques  YOB: 1945  Primary Care Physician: Alka Nam MD  Medical Oncologist: Amari Paez MD  Radiation Oncologist: Sharon Turcios MD     Subjective: Patient presents for her three month follow up secondary to history of breast cancer. She is doing well. She completed her radiation therapy. The patient has no breast complaints. She denies any bilateral palpable masses/lesions. She denies any bilateral skin changes/erythema/thickening/dimpling. She denies any nipple changes/retraction or discharge bilaterally. She states she had a TIA in December. But, she is doing well now with no problems and/or complaints. Breast Cancer History:  Tumor Site: Left breast  Type: invasive ductal carcinoma   Stage: 1 (T1, N0, Mx)  Nuclear stGstrstastdstest:st st1st ER: +  [place]: +  Hyf9ujy: -  Tumor Size: 0.9 cm  Lymph node Status: Negative (0/5 SLN)  Oncotype Dx Recurrence Score: pending  XRT: pending  Endocrine Therapy: pending  Chemotherapy: await oncotype  Breast Surgery Type/Date: left breast needle localized partial mastectomy, sentinel lymph node biopsy and Biozorb placement 10/26/17  Breast Biopsy/Diagnosis Date: Ultrasound guided, 9/11/17  Biopsy Performed Location: [facility]              Pathology:   Stage 1 (T1, N0, Mx) left breast invasive ductal carcinoma, NG 2  ER +, [place]+, [id number] -  Tumor 0.9 cm, 0/5 sentinel lymph nodes positive (all sentinel nodes negative)    Vitals:    02/20/18 1447   BP: 133/81   Pulse: 73   Weight: 146 lb (66.2 kg)   Height: 5' 3\" (1.6 m)             ROS  Constitutional: no weight loss, fever, night sweats   Skin: negative  Cardiovascular: no chest pain or palpitations   Pulmonary: No cough, sputum, or hemoptysis   GI:No abdominal pain  Breast: see above  All other systems were reviewed and are negative        Objective:  Breast:  Bilateral breasts are symmetrical. The bilateral nipples are everted without discharge.  The skin bilaterally is without erythema/thickening (peau d'orange)/dimpling. There are no palpable masses/lesions bilaterally. There is a well healed left breast surgical scar    Axilla: The left axillary incision is well healed. No evidence of bilateral palpable adenopathy   Extremity: Left upper extremity without evidence of lymphedema. Good range of motion. Good  and upper extremity muscle strength  General: AAO x 3, NAD, well nourished  Heart: RRR, no murmurs/gallops/rubs  Lungs: CTA B/L, no wheezes, rhonchi, rales   Neck: Supple, No JVD/Thyromegaly, No bruits  Psyche:  Good mood/Positive attitude  Skin: No rashes          Assessment:   1. Stage 1 (T1, N0, Mx) left breast invasive ductal carcinoma, NG 2, ER +, [place]+, [id number] -  2. Status post left breast needle localized partial mastectomy, sentinel lymph node biopsy and Biozorb placement 10/26/17  3. Status post left breast ultrasound guided core biopsy 9/11/17  4. Family history of breast cancer, first degree relative (sister, mother) and non first degree relative (maternal grandmother and maternal cousin)        Plan:  1. Return to clinic three months, sooner if needed  2. Left mammogram six months post op  3. Upper extremity measurements were taken and documented  4. Patient will require adjuvant endocrine therapy  5. Continue annual screening mammograms  6. Continue monthly self exams  7. Importance and technique of self breast exams reviewed with patient. All questions answered. 8. Recommended for patient to discuss endocrine therapy and TIA with Dr. Charlie Castelan.         A total of 10 minutes was spent face to face/involved with this patient, Greater than 50% was spent counseling, discussing imaging findings/report, reviewing her chart and answering her questions.

## 2018-03-02 ENCOUNTER — OFFICE VISIT (OUTPATIENT)
Dept: FAMILY MEDICINE CLINIC | Age: 73
End: 2018-03-02

## 2018-03-02 VITALS
WEIGHT: 149.5 LBS | DIASTOLIC BLOOD PRESSURE: 80 MMHG | BODY MASS INDEX: 25.52 KG/M2 | HEIGHT: 64 IN | SYSTOLIC BLOOD PRESSURE: 120 MMHG | HEART RATE: 87 BPM

## 2018-03-02 DIAGNOSIS — M81.0 AGE-RELATED OSTEOPOROSIS WITHOUT CURRENT PATHOLOGICAL FRACTURE: ICD-10-CM

## 2018-03-02 DIAGNOSIS — E55.9 VITAMIN D DEFICIENCY: ICD-10-CM

## 2018-03-02 DIAGNOSIS — I63.439 CEREBROVASCULAR ACCIDENT (CVA) DUE TO EMBOLISM OF POSTERIOR CEREBRAL ARTERY WITH INFARCTIONS OF BOTH OCCIPITAL LOBES (HCC): ICD-10-CM

## 2018-03-02 DIAGNOSIS — Z00.00 WELL ADULT EXAM: Primary | ICD-10-CM

## 2018-03-02 DIAGNOSIS — G95.9 CERVICAL MYELOPATHY (HCC): ICD-10-CM

## 2018-03-02 DIAGNOSIS — H53.40 VISUAL FIELD DEFECT: ICD-10-CM

## 2018-03-02 DIAGNOSIS — F32.A DEPRESSION, UNSPECIFIED DEPRESSION TYPE: ICD-10-CM

## 2018-03-02 PROBLEM — Z01.818 PRE-OP EXAM: Status: RESOLVED | Noted: 2017-10-11 | Resolved: 2018-03-02

## 2018-03-02 LAB
BASOPHILS ABSOLUTE: 0 K/UL (ref 0–0.2)
BASOPHILS RELATIVE PERCENT: 0.7 %
EOSINOPHILS ABSOLUTE: 0.1 K/UL (ref 0–0.6)
EOSINOPHILS RELATIVE PERCENT: 1.8 %
HCT VFR BLD CALC: 46.3 % (ref 36–48)
HEMOGLOBIN: 15.7 G/DL (ref 12–16)
LYMPHOCYTES ABSOLUTE: 1.4 K/UL (ref 1–5.1)
LYMPHOCYTES RELATIVE PERCENT: 20.8 %
MCH RBC QN AUTO: 31.6 PG (ref 26–34)
MCHC RBC AUTO-ENTMCNC: 33.9 G/DL (ref 31–36)
MCV RBC AUTO: 93.1 FL (ref 80–100)
MONOCYTES ABSOLUTE: 0.6 K/UL (ref 0–1.3)
MONOCYTES RELATIVE PERCENT: 9.2 %
NEUTROPHILS ABSOLUTE: 4.4 K/UL (ref 1.7–7.7)
NEUTROPHILS RELATIVE PERCENT: 67.5 %
PDW BLD-RTO: 13.1 % (ref 12.4–15.4)
PLATELET # BLD: 289 K/UL (ref 135–450)
PMV BLD AUTO: 8.3 FL (ref 5–10.5)
RBC # BLD: 4.98 M/UL (ref 4–5.2)
WBC # BLD: 6.6 K/UL (ref 4–11)

## 2018-03-02 PROCEDURE — G8598 ASA/ANTIPLAT THER USED: HCPCS | Performed by: FAMILY MEDICINE

## 2018-03-02 PROCEDURE — G0439 PPPS, SUBSEQ VISIT: HCPCS | Performed by: FAMILY MEDICINE

## 2018-03-02 RX ORDER — PREDNISONE 20 MG/1
40 TABLET ORAL DAILY
Qty: 10 TABLET | Refills: 0 | Status: SHIPPED | OUTPATIENT
Start: 2018-03-02 | End: 2018-03-07

## 2018-03-02 NOTE — PROGRESS NOTES
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 (HCC)    Visual field defect    Cervical myelopathy (HCC)       Allergies   Allergen Reactions    Sulfa Antibiotics     Sulfamethoxazole-Trimethoprim Other (See Comments)    Vicodin [Hydrocodone-Acetaminophen] Nausea And Vomiting     Outpatient Prescriptions Marked as Taking for the 3/2/18 encounter (Office Visit) with Freda Cordova MD   Medication Sig Dispense Refill    anastrozole (ARIMIDEX) 1 MG tablet Take 1 mg by mouth daily      sertraline (ZOLOFT) 50 MG tablet TAKE ONE TABLET BY MOUTH ONCE DAILY 30 tablet 5    Psyllium (METAMUCIL PO) Take by mouth      calcium carbonate 600 MG TABS tablet Take 1 tablet by mouth daily      Potassium Gluconate 595 MG CAPS Take by mouth      Cholecalciferol (VITAMIN D-3) 1000 UNITS CAPS Take by mouth      Ascorbic Acid (VITAMIN C) 250 MG tablet Take 250 mg by mouth daily.  aspirin 81 MG tablet Take 81 mg by mouth daily.  Multiple Vitamin (MULTIVITAMIN PO) Take  by mouth.          Past Medical History:   Diagnosis Date    Vernon's esophagus     Cancer [facility])     breast    Colon polyp 1/15/2016    Gallstones     GERD (gastroesophageal reflux disease)     Hypertension     Osteoporoses     Pregnancies     2- vaginal deliveries- 2    Tubular adenoma nos      Past Surgical History:   Procedure Laterality Date    APPENDECTOMY      BLADDER REPAIR  9/09    cah    BREAST SURGERY Left chart.      HRA interpretation guide- enter risks identified through screenings into table below    Behavioral Risks:  Tobacco:  If patient responded \"yes\" to HRA question #6, screen is positive. Alcohol use: Add up scores of alcohol use questions (HRA # 3-5)- positive result is 3 or above for women and 4 or above for men. Physical activity:  If patient responded \"no\" to HRA question #16, screen is positive. Nutrition:  Body mass index is 26.07 kg/m². Benjamen Lies If patient responded \"yes\" to HRA question #17, or BMI <18 or >30, screen is positive. Psychosocial Risks:  Anxiety:  Add up scores of anxiety questions (HRA #18-19)- positive result is 3 or above, or if patient has current or past diagnosis of anxiety. Depression:  Add up scores of PHQ-2 (HRA #20-21): positive result is 3 or above, or if patient has current or past diagnosis of depression. Social/Emotional support:  If patient responded \"sometimes,\" or \"rarely/never\" to HRA question #22, screen is positive. Pain:  If patient responded \"a lot\" to HRA question #24, screen is positive. Functional/Safety Risks:  Memory:  Mini-Cog results per MA note. Living situation:  If patient responded \"lives alone\" to HRA question #23, screen is positive. Hearing: If patient responded \"yes\" to HRA question #25, screen is positive. Safety:  If patient responded \"no\" to any of the HRA questions #26-31, screen is positive. ADLs:  If patient responded \"yes\" to HRA questions #32 or #33, screen is positive. Fall risk:  Per MA note, If timed Get Up & Go test unsteady or longer than 20 seconds, or falls reported per HRA question #34, screen is positive. Vision:  Screen per MA note. Personalized Preventive Plan   This plan is provided to the patient in written form.        Preventive plan of care for [employer]        3/2/2018           Preventive Measures Status       Recommendations for screening   Colon Cancer Screen   Last colonoscopy: 2017 Screening covered every 10 years  Repeat in 10 years   Breast Cancer Screen  Last mammogram: 2017  Covered annually  Repeat yearly   Cervical Cancer Screen   Last PAP smear: - Covered every 2 years, annually if high risk  Repeat screening is not clinically indicated   Osteoporosis Screen   Last DXA scan: 2017 Covered every 2 years  Repeat every 2 years   Diabetes Screen  Glucose (mg/dL)   Date Value   10/11/2017 88    Screening covered annually, every 6 months if pre-diabetic  Test recommended and ordered   Cholesterol Screen  Lab Results   Component Value Date    CHOL 197 01/10/2017    TRIG 203 (H) 01/10/2017    HDL 50 01/10/2017    LDLCALC 106 (H) 01/10/2017    Screening covered every 5 years   Test recommended and ordered   Aspirin for Cardiovascular Prevention   No Continue daily aspirin    Recommended Immunizations    Immunization History   Administered Date(s) Administered    Influenza Vaccine, unspecified formulation 10/16/2016    Influenza Virus Vaccine 10/10/2014    Influenza, High Dose 11/22/2017    Pneumococcal 13-valent Conjugate (Aginobv35) 01/19/2016    Pneumococcal Polysaccharide ([id number]) 10/15/2014    Zoster Live (Zostavax) 06/10/2016    Influenza vaccine: recommended every fall  Pneumonia vaccine: previously administered after age 72- no need to repeat  Shingles vaccine: admininstered previously- no need to repeat  Tetanus vaccine: tetanus and diptheria vaccine (Td) recommended every 10 years- due now         Risk Factors and Conditions Identified During Visit  Risks Identified Treatment options   Behavioral Risks  · None identified   · No treatment is necessary   Psychosocial Risks  · None identified   · No treatment is necessary    Functional/Safety Risks  · None identified   · No treatment is necessary     Other Recommendations/Plans ·   labs        Osteoporosis  Off meds--worse and may need add'n tx    Cervical myelopathy ([facility] [address]  Will use pred for 5 d--?  PT    Depression  Stable--same

## 2018-03-03 LAB
A/G RATIO: 1.8 (ref 1.1–2.2)
ALBUMIN SERPL-MCNC: 4.6 G/DL (ref 3.4–5)
ALP BLD-CCNC: 67 U/L (ref 40–129)
ALT SERPL-CCNC: 17 U/L (ref 10–40)
ANION GAP SERPL CALCULATED.3IONS-SCNC: 15 MMOL/L (ref 3–16)
AST SERPL-CCNC: 19 U/L (ref 15–37)
BILIRUB SERPL-MCNC: 0.6 MG/DL (ref 0–1)
BUN BLDV-MCNC: 17 MG/DL (ref 7–20)
CALCIUM SERPL-MCNC: 9.8 MG/DL (ref 8.3–10.6)
CHLORIDE BLD-SCNC: 100 MMOL/L (ref 99–110)
CHOLESTEROL, TOTAL: 197 MG/DL (ref 0–199)
CO2: 27 MMOL/L (ref 21–32)
CREAT SERPL-MCNC: 0.6 MG/DL (ref 0.6–1.2)
GFR AFRICAN AMERICAN: >60
GFR NON-AFRICAN AMERICAN: >60
GLOBULIN: 2.5 G/DL
GLUCOSE BLD-MCNC: 104 MG/DL (ref 70–99)
HDLC SERPL-MCNC: 45 MG/DL (ref 40–60)
LDL CHOLESTEROL CALCULATED: 108 MG/DL
POTASSIUM SERPL-SCNC: 4.6 MMOL/L (ref 3.5–5.1)
SODIUM BLD-SCNC: 142 MMOL/L (ref 136–145)
TOTAL PROTEIN: 7.1 G/DL (ref 6.4–8.2)
TRIGL SERPL-MCNC: 218 MG/DL (ref 0–150)
VITAMIN D 25-HYDROXY: 63.5 NG/ML
VLDLC SERPL CALC-MCNC: 44 MG/DL

## 2018-03-08 ENCOUNTER — TELEPHONE (OUTPATIENT)
Dept: FAMILY MEDICINE CLINIC | Age: 73
End: 2018-03-08

## 2018-04-12 PROBLEM — R05.9 COUGH: Status: RESOLVED | Noted: 2017-09-13 | Resolved: 2018-04-12

## 2018-05-17 RX ORDER — OMEPRAZOLE 20 MG/1
CAPSULE, DELAYED RELEASE ORAL
Qty: 90 CAPSULE | Refills: 0 | Status: SHIPPED | OUTPATIENT
Start: 2018-05-17 | End: 2018-08-16 | Stop reason: SDUPTHER

## 2018-09-07 ENCOUNTER — OFFICE VISIT (OUTPATIENT)
Dept: FAMILY MEDICINE CLINIC | Age: 73
End: 2018-09-07

## 2018-09-07 VITALS
SYSTOLIC BLOOD PRESSURE: 120 MMHG | BODY MASS INDEX: 25.73 KG/M2 | WEIGHT: 150.7 LBS | HEIGHT: 64 IN | OXYGEN SATURATION: 96 % | DIASTOLIC BLOOD PRESSURE: 80 MMHG | HEART RATE: 67 BPM

## 2018-09-07 DIAGNOSIS — M94.0 COSTOCHONDRITIS: ICD-10-CM

## 2018-09-07 DIAGNOSIS — R07.9 CHEST PAIN, UNSPECIFIED TYPE: Primary | ICD-10-CM

## 2018-09-07 PROCEDURE — 1036F TOBACCO NON-USER: CPT | Performed by: FAMILY MEDICINE

## 2018-09-07 PROCEDURE — 93000 ELECTROCARDIOGRAM COMPLETE: CPT | Performed by: FAMILY MEDICINE

## 2018-09-07 PROCEDURE — 4040F PNEUMOC VAC/ADMIN/RCVD: CPT | Performed by: FAMILY MEDICINE

## 2018-09-07 PROCEDURE — 1101F PT FALLS ASSESS-DOCD LE1/YR: CPT | Performed by: FAMILY MEDICINE

## 2018-09-07 PROCEDURE — G8419 CALC BMI OUT NRM PARAM NOF/U: HCPCS | Performed by: FAMILY MEDICINE

## 2018-09-07 PROCEDURE — G8399 PT W/DXA RESULTS DOCUMENT: HCPCS | Performed by: FAMILY MEDICINE

## 2018-09-07 PROCEDURE — 3017F COLORECTAL CA SCREEN DOC REV: CPT | Performed by: FAMILY MEDICINE

## 2018-09-07 PROCEDURE — 1123F ACP DISCUSS/DSCN MKR DOCD: CPT | Performed by: FAMILY MEDICINE

## 2018-09-07 PROCEDURE — G8598 ASA/ANTIPLAT THER USED: HCPCS | Performed by: FAMILY MEDICINE

## 2018-09-07 PROCEDURE — 1090F PRES/ABSN URINE INCON ASSESS: CPT | Performed by: FAMILY MEDICINE

## 2018-09-07 PROCEDURE — 99213 OFFICE O/P EST LOW 20 MIN: CPT | Performed by: FAMILY MEDICINE

## 2018-09-07 PROCEDURE — G8427 DOCREV CUR MEDS BY ELIG CLIN: HCPCS | Performed by: FAMILY MEDICINE

## 2018-09-07 RX ORDER — ASPIRIN 325 MG
325 TABLET ORAL DAILY
COMMUNITY
End: 2019-02-07 | Stop reason: SDUPTHER

## 2018-09-07 ASSESSMENT — COPD QUESTIONNAIRES: COPD: 0

## 2018-09-07 ASSESSMENT — ENCOUNTER SYMPTOMS
ORTHOPNEA: 0
SPUTUM PRODUCTION: 0
NAUSEA: 0
HEMOPTYSIS: 0
SHORTNESS OF BREATH: 0
COUGH: 0
ABDOMINAL PAIN: 0
BACK PAIN: 0
VOMITING: 0

## 2018-09-07 NOTE — PROGRESS NOTES
 omeprazole (PRILOSEC) 20 MG delayed release capsule TAKE 1 CAPSULE BY MOUTH ONCE DAILY 90 capsule 3    sertraline (ZOLOFT) 50 MG tablet TAKE ONE TABLET BY MOUTH ONCE DAILY 30 tablet 5    anastrozole (ARIMIDEX) 1 MG tablet Take 1 mg by mouth daily      Psyllium (METAMUCIL PO) Take by mouth      calcium carbonate 600 MG TABS tablet Take 1 tablet by mouth daily      Potassium Gluconate 595 MG CAPS Take by mouth      Cholecalciferol (VITAMIN D-3) 1000 UNITS CAPS Take by mouth      Ascorbic Acid (VITAMIN C) 250 MG tablet Take 250 mg by mouth daily.  Multiple Vitamin (MULTIVITAMIN PO) Take  by mouth.  docusate sodium (COLACE) 100 MG capsule Take 1 capsule by mouth 2 times daily Please take while taking narcotic pain medicine. If you develop loose or watery stools, then stop taking. 30 capsule 0    alendronate (FOSAMAX) 70 MG tablet Take with water on an empty stomach- wait 30 minutes before eating or taking other meds. Avoid lying down for 30 minutes after dose. 4 tablet 5    aspirin 81 MG tablet Take 81 mg by mouth daily. No current facility-administered medications for this visit.       Facility-Administered Medications Ordered in Other Visits   Medication Dose Route Frequency Provider Last Rate Last Dose    lactated ringers infusion   Intravenous Continuous Callie Ghosh MD        lactated ringers infusion  125 mL/hr Intravenous Continuous Joleen Vazquez MD   Stopped at 10/26/17 1231    sodium chloride flush 0.9 % injection 10 mL  10 mL Intravenous 2 times per day Joleen Vazquez MD        sodium chloride flush 0.9 % injection 10 mL  10 mL Intravenous PRN Joleen Vazquez MD        glycopyrrolate (ROBINUL) injection 0.2 mg  0.2 mg Intravenous Once Joleen Vazquez MD        fentaNYL (SUBLIMAZE) injection 25 mcg  25 mcg Intravenous Q5 Min PRN Joleen Vazquez MD        HYDROmorphone (DILAUDID) injection 0.5 mg  0.5 mg Intravenous Q5 Min PRN MD Ike Bruner Heart Disease Sister     High Blood Pressure Sister     Cancer Sister 61        breast       Immunization History   Administered Date(s) Administered    Influenza Vaccine, unspecified formulation 10/16/2016    Influenza Virus Vaccine 10/10/2014    Influenza, High Dose (Fluzone 65 yrs and older) 11/22/2017    Pneumococcal 13-valent Conjugate (Sptskne47) 01/19/2016    Pneumococcal Polysaccharide (Dtodczctm03) 10/15/2014    Zoster Live (Zostavax) 06/10/2016       Review of Systems  Review of Systems   Constitutional: Negative for diaphoresis, fever and malaise/fatigue. Respiratory: Negative for cough, hemoptysis, sputum production and shortness of breath. Cardiovascular: Positive for chest pain. Negative for palpitations, orthopnea, claudication, syncope, PND and near-syncope. Gastrointestinal: Negative for abdominal pain, nausea and vomiting. Musculoskeletal: Negative for back pain. Neurological: Negative for dizziness, seizures, weakness, numbness and headaches. Objective:   Physical Exam  Physical Exam   Constitutional: She is oriented to person, place, and time. She appears well-developed and well-nourished. No distress. HENT:   Mouth/Throat: Oropharynx is clear and moist.   Eyes: Pupils are equal, round, and reactive to light. Conjunctivae are normal.   Neck: No JVD present. No tracheal deviation present. No thyromegaly present. Cardiovascular: Normal rate, regular rhythm, normal heart sounds and intact distal pulses. Exam reveals no gallop. No murmur heard. Pulmonary/Chest: Effort normal and breath sounds normal. No stridor. No respiratory distress. She has no wheezes. She has no rales. She exhibits no tenderness. Abdominal: Soft. Bowel sounds are normal. She exhibits no distension and no mass. There is no tenderness. Musculoskeletal: She exhibits no edema or tenderness. Lymphadenopathy:     She has no cervical adenopathy.    Neurological: She is alert and oriented to person, place, and time. She displays normal reflexes. No cranial nerve deficit. She exhibits normal muscle tone. Coordination normal.   Skin: Skin is warm and dry. No rash noted. No erythema. No pallor. Psychiatric: She has a normal mood and affect. Her behavior is normal. Judgment and thought content normal.   Vitals reviewed. EKG: normal EKG, normal sinus rhythm, unchanged from previous tracings.     Assessment and Plan:     Costochondritis  Aleve and reassurance

## 2018-11-15 ENCOUNTER — HOSPITAL ENCOUNTER (OUTPATIENT)
Dept: MAMMOGRAPHY | Age: 73
Discharge: HOME | End: 2018-11-15
Payer: MEDICARE

## 2018-11-15 ENCOUNTER — TELEPHONE (OUTPATIENT)
Dept: FAMILY MEDICINE CLINIC | Age: 73
End: 2018-11-15

## 2018-11-15 DIAGNOSIS — Z85.3 PERSONAL HISTORY OF MALIGNANT NEOPLASM OF BREAST: ICD-10-CM

## 2018-11-15 PROCEDURE — G0279 TOMOSYNTHESIS, MAMMO: HCPCS

## 2018-12-11 ENCOUNTER — OFFICE VISIT (OUTPATIENT)
Dept: FAMILY MEDICINE CLINIC | Age: 73
End: 2018-12-11
Payer: MEDICARE

## 2018-12-11 VITALS
WEIGHT: 147 LBS | HEART RATE: 87 BPM | BODY MASS INDEX: 25.1 KG/M2 | HEIGHT: 64 IN | SYSTOLIC BLOOD PRESSURE: 148 MMHG | OXYGEN SATURATION: 95 % | RESPIRATION RATE: 16 BRPM | DIASTOLIC BLOOD PRESSURE: 70 MMHG

## 2018-12-11 DIAGNOSIS — Z23 NEED FOR PROPHYLACTIC VACCINATION WITH TETANUS-DIPHTHERIA (TD): ICD-10-CM

## 2018-12-11 DIAGNOSIS — N30.90 CYSTITIS: Primary | ICD-10-CM

## 2018-12-11 LAB
BILIRUBIN URINE: NEGATIVE
BILIRUBIN, POC: NORMAL
BLOOD URINE, POC: NORMAL
BLOOD, URINE: ABNORMAL
CLARITY, POC: NORMAL
CLARITY: ABNORMAL
COLOR, POC: YELLOW
COLOR: YELLOW
COMMENT UA: ABNORMAL
CRYSTALS, UA: ABNORMAL /HPF
EPITHELIAL CELLS, UA: 1 /HPF (ref 0–5)
GLUCOSE URINE, POC: NORMAL
GLUCOSE URINE: NEGATIVE MG/DL
HYALINE CASTS: 0 /LPF (ref 0–8)
KETONES, POC: NORMAL
KETONES, URINE: NEGATIVE MG/DL
LEUKOCYTE EST, POC: NORMAL
LEUKOCYTE ESTERASE, URINE: NEGATIVE
MICROSCOPIC EXAMINATION: YES
NITRITE, POC: NORMAL
NITRITE, URINE: NEGATIVE
PH UA: 7
PH, POC: NORMAL
PROTEIN UA: NEGATIVE MG/DL
PROTEIN, POC: NORMAL
RBC UA: 3 /HPF (ref 0–4)
SPECIFIC GRAVITY UA: 1.02
SPECIFIC GRAVITY, POC: 1.01
URINE TYPE: ABNORMAL
UROBILINOGEN, POC: NORMAL
UROBILINOGEN, URINE: 1 E.U./DL
WBC UA: 1 /HPF (ref 0–5)

## 2018-12-11 PROCEDURE — 3288F FALL RISK ASSESSMENT DOCD: CPT | Performed by: NURSE PRACTITIONER

## 2018-12-11 PROCEDURE — 99213 OFFICE O/P EST LOW 20 MIN: CPT | Performed by: NURSE PRACTITIONER

## 2018-12-11 PROCEDURE — 81003 URINALYSIS AUTO W/O SCOPE: CPT | Performed by: NURSE PRACTITIONER

## 2018-12-11 RX ORDER — TETANUS AND DIPHTHERIA TOXOIDS ADSORBED 2; 2 [LF]/.5ML; [LF]/.5ML
0.5 INJECTION INTRAMUSCULAR ONCE
Qty: 0.5 ML | Refills: 0 | Status: SHIPPED | OUTPATIENT
Start: 2018-12-11 | End: 2018-12-11

## 2018-12-11 RX ORDER — CEPHALEXIN 250 MG/1
250 CAPSULE ORAL 4 TIMES DAILY
Qty: 28 CAPSULE | Refills: 0 | Status: SHIPPED | OUTPATIENT
Start: 2018-12-11 | End: 2019-01-04 | Stop reason: ALTCHOICE

## 2018-12-11 ASSESSMENT — ENCOUNTER SYMPTOMS
VOMITING: 0
NAUSEA: 0

## 2018-12-13 LAB — URINE CULTURE, ROUTINE: NORMAL

## 2019-01-04 ENCOUNTER — OFFICE VISIT (OUTPATIENT)
Dept: FAMILY MEDICINE CLINIC | Age: 74
End: 2019-01-04
Payer: MEDICARE

## 2019-01-04 VITALS
WEIGHT: 149 LBS | SYSTOLIC BLOOD PRESSURE: 140 MMHG | BODY MASS INDEX: 25.44 KG/M2 | HEART RATE: 86 BPM | HEIGHT: 64 IN | DIASTOLIC BLOOD PRESSURE: 78 MMHG | RESPIRATION RATE: 14 BRPM | OXYGEN SATURATION: 95 %

## 2019-01-04 DIAGNOSIS — R31.9 HEMATURIA, UNSPECIFIED TYPE: ICD-10-CM

## 2019-01-04 DIAGNOSIS — F32.A DEPRESSION, UNSPECIFIED DEPRESSION TYPE: ICD-10-CM

## 2019-01-04 DIAGNOSIS — N30.00 ACUTE CYSTITIS WITHOUT HEMATURIA: Primary | ICD-10-CM

## 2019-01-04 DIAGNOSIS — F32.5 MAJOR DEPRESSIVE DISORDER, SINGLE EPISODE, IN FULL REMISSION (HCC): ICD-10-CM

## 2019-01-04 LAB
BILIRUBIN, POC: NORMAL
BLOOD URINE, POC: NORMAL
CLARITY, POC: CLEAR
COLOR, POC: YELLOW
GLUCOSE URINE, POC: NORMAL
KETONES, POC: NORMAL
LEUKOCYTE EST, POC: NORMAL
NITRITE, POC: NORMAL
PH, POC: 7
PROTEIN, POC: NORMAL
SPECIFIC GRAVITY, POC: 1.01
UROBILINOGEN, POC: 0.2

## 2019-01-04 PROCEDURE — G8598 ASA/ANTIPLAT THER USED: HCPCS | Performed by: FAMILY MEDICINE

## 2019-01-04 PROCEDURE — 1123F ACP DISCUSS/DSCN MKR DOCD: CPT | Performed by: FAMILY MEDICINE

## 2019-01-04 PROCEDURE — 1036F TOBACCO NON-USER: CPT | Performed by: FAMILY MEDICINE

## 2019-01-04 PROCEDURE — 99213 OFFICE O/P EST LOW 20 MIN: CPT | Performed by: FAMILY MEDICINE

## 2019-01-04 PROCEDURE — G8419 CALC BMI OUT NRM PARAM NOF/U: HCPCS | Performed by: FAMILY MEDICINE

## 2019-01-04 PROCEDURE — G8399 PT W/DXA RESULTS DOCUMENT: HCPCS | Performed by: FAMILY MEDICINE

## 2019-01-04 PROCEDURE — 1090F PRES/ABSN URINE INCON ASSESS: CPT | Performed by: FAMILY MEDICINE

## 2019-01-04 PROCEDURE — 81002 URINALYSIS NONAUTO W/O SCOPE: CPT | Performed by: FAMILY MEDICINE

## 2019-01-04 PROCEDURE — 4040F PNEUMOC VAC/ADMIN/RCVD: CPT | Performed by: FAMILY MEDICINE

## 2019-01-04 PROCEDURE — G8427 DOCREV CUR MEDS BY ELIG CLIN: HCPCS | Performed by: FAMILY MEDICINE

## 2019-01-04 PROCEDURE — G8482 FLU IMMUNIZE ORDER/ADMIN: HCPCS | Performed by: FAMILY MEDICINE

## 2019-01-04 PROCEDURE — 3017F COLORECTAL CA SCREEN DOC REV: CPT | Performed by: FAMILY MEDICINE

## 2019-01-04 PROCEDURE — 1101F PT FALLS ASSESS-DOCD LE1/YR: CPT | Performed by: FAMILY MEDICINE

## 2019-01-04 ASSESSMENT — ENCOUNTER SYMPTOMS
VOMITING: 0
NAUSEA: 0

## 2019-02-07 ENCOUNTER — OFFICE VISIT (OUTPATIENT)
Dept: FAMILY MEDICINE CLINIC | Age: 74
End: 2019-02-07
Payer: MEDICARE

## 2019-02-07 VITALS
HEART RATE: 78 BPM | SYSTOLIC BLOOD PRESSURE: 132 MMHG | HEIGHT: 64 IN | BODY MASS INDEX: 25.61 KG/M2 | OXYGEN SATURATION: 94 % | DIASTOLIC BLOOD PRESSURE: 84 MMHG | WEIGHT: 150 LBS

## 2019-02-07 DIAGNOSIS — Z17.0 MALIGNANT NEOPLASM OF UPPER-OUTER QUADRANT OF LEFT BREAST IN FEMALE, ESTROGEN RECEPTOR POSITIVE (HCC): ICD-10-CM

## 2019-02-07 DIAGNOSIS — E55.9 VITAMIN D DEFICIENCY: ICD-10-CM

## 2019-02-07 DIAGNOSIS — R26.89 BALANCE DISORDER: ICD-10-CM

## 2019-02-07 DIAGNOSIS — M81.0 AGE-RELATED OSTEOPOROSIS WITHOUT CURRENT PATHOLOGICAL FRACTURE: ICD-10-CM

## 2019-02-07 DIAGNOSIS — Z00.00 ANNUAL PHYSICAL EXAM: ICD-10-CM

## 2019-02-07 DIAGNOSIS — Z00.00 WELL ADULT EXAM: ICD-10-CM

## 2019-02-07 DIAGNOSIS — Z00.00 WELL ADULT EXAM: Primary | ICD-10-CM

## 2019-02-07 DIAGNOSIS — C50.412 MALIGNANT NEOPLASM OF UPPER-OUTER QUADRANT OF LEFT BREAST IN FEMALE, ESTROGEN RECEPTOR POSITIVE (HCC): ICD-10-CM

## 2019-02-07 LAB
A/G RATIO: 2.3 (ref 1.1–2.2)
ALBUMIN SERPL-MCNC: 5 G/DL (ref 3.4–5)
ALP BLD-CCNC: 83 U/L (ref 40–129)
ALT SERPL-CCNC: 18 U/L (ref 10–40)
ANION GAP SERPL CALCULATED.3IONS-SCNC: 13 MMOL/L (ref 3–16)
AST SERPL-CCNC: 19 U/L (ref 15–37)
BASOPHILS ABSOLUTE: 0.1 K/UL (ref 0–0.2)
BASOPHILS RELATIVE PERCENT: 1.3 %
BILIRUB SERPL-MCNC: 0.6 MG/DL (ref 0–1)
BILIRUBIN, POC: ABNORMAL
BLOOD URINE, POC: ABNORMAL
BUN BLDV-MCNC: 17 MG/DL (ref 7–20)
CALCIUM SERPL-MCNC: 10.1 MG/DL (ref 8.3–10.6)
CHLORIDE BLD-SCNC: 103 MMOL/L (ref 99–110)
CHOLESTEROL, TOTAL: 177 MG/DL (ref 0–199)
CLARITY, POC: CLEAR
CO2: 26 MMOL/L (ref 21–32)
COLOR, POC: YELLOW
CREAT SERPL-MCNC: 0.7 MG/DL (ref 0.6–1.2)
EOSINOPHILS ABSOLUTE: 0.2 K/UL (ref 0–0.6)
EOSINOPHILS RELATIVE PERCENT: 2 %
GFR AFRICAN AMERICAN: >60
GFR NON-AFRICAN AMERICAN: >60
GLOBULIN: 2.2 G/DL
GLUCOSE BLD-MCNC: 101 MG/DL (ref 70–99)
GLUCOSE URINE, POC: ABNORMAL
HCT VFR BLD CALC: 44.3 % (ref 36–48)
HDLC SERPL-MCNC: 44 MG/DL (ref 40–60)
HEMOGLOBIN: 15.3 G/DL (ref 12–16)
KETONES, POC: ABNORMAL
LDL CHOLESTEROL CALCULATED: 98 MG/DL
LEUKOCYTE EST, POC: ABNORMAL
LYMPHOCYTES ABSOLUTE: 2 K/UL (ref 1–5.1)
LYMPHOCYTES RELATIVE PERCENT: 26.3 %
MCH RBC QN AUTO: 31.5 PG (ref 26–34)
MCHC RBC AUTO-ENTMCNC: 34.5 G/DL (ref 31–36)
MCV RBC AUTO: 91.1 FL (ref 80–100)
MONOCYTES ABSOLUTE: 0.7 K/UL (ref 0–1.3)
MONOCYTES RELATIVE PERCENT: 9 %
NEUTROPHILS ABSOLUTE: 4.8 K/UL (ref 1.7–7.7)
NEUTROPHILS RELATIVE PERCENT: 61.4 %
NITRITE, POC: ABNORMAL
PDW BLD-RTO: 12.7 % (ref 12.4–15.4)
PH, POC: 6.5
PLATELET # BLD: 289 K/UL (ref 135–450)
PMV BLD AUTO: 8.3 FL (ref 5–10.5)
POTASSIUM SERPL-SCNC: 4.8 MMOL/L (ref 3.5–5.1)
PROTEIN, POC: ABNORMAL
RBC # BLD: 4.86 M/UL (ref 4–5.2)
SODIUM BLD-SCNC: 142 MMOL/L (ref 136–145)
SPECIFIC GRAVITY, POC: 1.01
TOTAL PROTEIN: 7.2 G/DL (ref 6.4–8.2)
TRIGL SERPL-MCNC: 176 MG/DL (ref 0–150)
UROBILINOGEN, POC: 0.2
VITAMIN D 25-HYDROXY: 48.4 NG/ML
VLDLC SERPL CALC-MCNC: 35 MG/DL
WBC # BLD: 7.7 K/UL (ref 4–11)

## 2019-02-07 PROCEDURE — G8482 FLU IMMUNIZE ORDER/ADMIN: HCPCS | Performed by: FAMILY MEDICINE

## 2019-02-07 PROCEDURE — 4040F PNEUMOC VAC/ADMIN/RCVD: CPT | Performed by: FAMILY MEDICINE

## 2019-02-07 PROCEDURE — 81002 URINALYSIS NONAUTO W/O SCOPE: CPT | Performed by: FAMILY MEDICINE

## 2019-02-07 PROCEDURE — G8598 ASA/ANTIPLAT THER USED: HCPCS | Performed by: FAMILY MEDICINE

## 2019-02-07 PROCEDURE — G0439 PPPS, SUBSEQ VISIT: HCPCS | Performed by: FAMILY MEDICINE

## 2019-02-07 ASSESSMENT — LIFESTYLE VARIABLES
AUDIT-C TOTAL SCORE: 1
HOW OFTEN DO YOU HAVE A DRINK CONTAINING ALCOHOL: 1
HOW OFTEN DURING THE LAST YEAR HAVE YOU HAD A FEELING OF GUILT OR REMORSE AFTER DRINKING: 0
HAS A RELATIVE, FRIEND, DOCTOR, OR ANOTHER HEALTH PROFESSIONAL EXPRESSED CONCERN ABOUT YOUR DRINKING OR SUGGESTED YOU CUT DOWN: 0
AUDIT TOTAL SCORE: 1
HOW OFTEN DO YOU HAVE SIX OR MORE DRINKS ON ONE OCCASION: 0
HAVE YOU OR SOMEONE ELSE BEEN INJURED AS A RESULT OF YOUR DRINKING: 0
HOW OFTEN DURING THE LAST YEAR HAVE YOU FOUND THAT YOU WERE NOT ABLE TO STOP DRINKING ONCE YOU HAD STARTED: 0
HOW OFTEN DURING THE LAST YEAR HAVE YOU BEEN UNABLE TO REMEMBER WHAT HAPPENED THE NIGHT BEFORE BECAUSE YOU HAD BEEN DRINKING: 0
HOW MANY STANDARD DRINKS CONTAINING ALCOHOL DO YOU HAVE ON A TYPICAL DAY: 0
HOW OFTEN DURING THE LAST YEAR HAVE YOU NEEDED AN ALCOHOLIC DRINK FIRST THING IN THE MORNING TO GET YOURSELF GOING AFTER A NIGHT OF HEAVY DRINKING: 0
HOW OFTEN DURING THE LAST YEAR HAVE YOU FAILED TO DO WHAT WAS NORMALLY EXPECTED FROM YOU BECAUSE OF DRINKING: 0

## 2019-02-07 ASSESSMENT — ANXIETY QUESTIONNAIRES: GAD7 TOTAL SCORE: 4

## 2019-02-07 ASSESSMENT — PATIENT HEALTH QUESTIONNAIRE - PHQ9
SUM OF ALL RESPONSES TO PHQ QUESTIONS 1-9: 0
SUM OF ALL RESPONSES TO PHQ QUESTIONS 1-9: 0
SUM OF ALL RESPONSES TO PHQ QUESTIONS 1-9: 1
SUM OF ALL RESPONSES TO PHQ QUESTIONS 1-9: 1

## 2019-02-14 ENCOUNTER — HOSPITAL ENCOUNTER (OUTPATIENT)
Dept: GENERAL RADIOLOGY | Age: 74
Discharge: HOME | End: 2019-02-14
Payer: MEDICARE

## 2019-02-14 DIAGNOSIS — M81.0 AGE-RELATED OSTEOPOROSIS WITHOUT CURRENT PATHOLOGICAL FRACTURE: ICD-10-CM

## 2019-02-14 PROCEDURE — 77080 DXA BONE DENSITY AXIAL: CPT

## 2019-02-18 RX ORDER — ALENDRONATE SODIUM 70 MG/1
70 TABLET ORAL
Qty: 4 TABLET | Refills: 5 | Status: SHIPPED | OUTPATIENT
Start: 2019-02-18 | End: 2019-08-18 | Stop reason: SDUPTHER

## 2019-03-01 ENCOUNTER — TELEPHONE (OUTPATIENT)
Dept: SURGERY | Age: 74
End: 2019-03-01

## 2019-03-19 ENCOUNTER — OFFICE VISIT (OUTPATIENT)
Dept: FAMILY MEDICINE CLINIC | Age: 74
End: 2019-03-19
Payer: MEDICARE

## 2019-03-19 VITALS
HEIGHT: 64 IN | DIASTOLIC BLOOD PRESSURE: 88 MMHG | BODY MASS INDEX: 25.54 KG/M2 | WEIGHT: 149.6 LBS | HEART RATE: 86 BPM | OXYGEN SATURATION: 93 % | SYSTOLIC BLOOD PRESSURE: 130 MMHG

## 2019-03-19 DIAGNOSIS — M81.0 AGE-RELATED OSTEOPOROSIS WITHOUT CURRENT PATHOLOGICAL FRACTURE: ICD-10-CM

## 2019-03-19 PROCEDURE — 3017F COLORECTAL CA SCREEN DOC REV: CPT | Performed by: FAMILY MEDICINE

## 2019-03-19 PROCEDURE — 4040F PNEUMOC VAC/ADMIN/RCVD: CPT | Performed by: FAMILY MEDICINE

## 2019-03-19 PROCEDURE — G8482 FLU IMMUNIZE ORDER/ADMIN: HCPCS | Performed by: FAMILY MEDICINE

## 2019-03-19 PROCEDURE — 99213 OFFICE O/P EST LOW 20 MIN: CPT | Performed by: FAMILY MEDICINE

## 2019-03-19 PROCEDURE — G8399 PT W/DXA RESULTS DOCUMENT: HCPCS | Performed by: FAMILY MEDICINE

## 2019-03-19 PROCEDURE — G8427 DOCREV CUR MEDS BY ELIG CLIN: HCPCS | Performed by: FAMILY MEDICINE

## 2019-03-19 PROCEDURE — 1123F ACP DISCUSS/DSCN MKR DOCD: CPT | Performed by: FAMILY MEDICINE

## 2019-03-19 PROCEDURE — 1101F PT FALLS ASSESS-DOCD LE1/YR: CPT | Performed by: FAMILY MEDICINE

## 2019-03-19 PROCEDURE — 1036F TOBACCO NON-USER: CPT | Performed by: FAMILY MEDICINE

## 2019-03-19 PROCEDURE — 1090F PRES/ABSN URINE INCON ASSESS: CPT | Performed by: FAMILY MEDICINE

## 2019-03-19 PROCEDURE — G8419 CALC BMI OUT NRM PARAM NOF/U: HCPCS | Performed by: FAMILY MEDICINE

## 2019-03-19 PROCEDURE — G8598 ASA/ANTIPLAT THER USED: HCPCS | Performed by: FAMILY MEDICINE

## 2019-03-19 ASSESSMENT — ENCOUNTER SYMPTOMS
CONSTIPATION: 0
SHORTNESS OF BREATH: 0
VOMITING: 0
SORE THROAT: 0
BLOOD IN STOOL: 0
ABDOMINAL PAIN: 0
COUGH: 0
EYE DISCHARGE: 0
APNEA: 0
SINUS PRESSURE: 0
VOICE CHANGE: 0
FACIAL SWELLING: 0
BACK PAIN: 0
ANAL BLEEDING: 0
EYE PAIN: 0
EYE ITCHING: 0
WHEEZING: 0
DIARRHEA: 0
CHOKING: 0
RHINORRHEA: 0
COLOR CHANGE: 0
CHEST TIGHTNESS: 0
EYE REDNESS: 0
TROUBLE SWALLOWING: 0
ABDOMINAL DISTENTION: 0
PHOTOPHOBIA: 0
NAUSEA: 0
STRIDOR: 0
RECTAL PAIN: 0

## 2019-03-22 ENCOUNTER — TELEPHONE (OUTPATIENT)
Dept: FAMILY MEDICINE CLINIC | Age: 74
End: 2019-03-22

## 2019-03-22 RX ORDER — OMEPRAZOLE 10 MG/1
10 CAPSULE, DELAYED RELEASE ORAL DAILY
Qty: 90 CAPSULE | Refills: 3 | Status: SHIPPED | OUTPATIENT
Start: 2019-03-22 | End: 2020-04-23

## 2019-06-04 ENCOUNTER — APPOINTMENT (OUTPATIENT)
Dept: CT IMAGING | Age: 74
End: 2019-06-04
Payer: MEDICARE

## 2019-06-04 ENCOUNTER — HOSPITAL ENCOUNTER (EMERGENCY)
Age: 74
Discharge: HOME | End: 2019-06-04
Attending: EMERGENCY MEDICINE
Payer: MEDICARE

## 2019-06-04 VITALS
TEMPERATURE: 98.2 F | SYSTOLIC BLOOD PRESSURE: 149 MMHG | HEART RATE: 90 BPM | BODY MASS INDEX: 25.69 KG/M2 | RESPIRATION RATE: 18 BRPM | HEIGHT: 63 IN | OXYGEN SATURATION: 98 % | DIASTOLIC BLOOD PRESSURE: 69 MMHG | WEIGHT: 145 LBS

## 2019-06-04 DIAGNOSIS — J18.9 PNEUMONIA DUE TO ORGANISM: Primary | ICD-10-CM

## 2019-06-04 LAB
ANION GAP SERPL CALCULATED.3IONS-SCNC: 14 MMOL/L (ref 3–16)
BACTERIA: ABNORMAL /HPF
BASOPHILS ABSOLUTE: 0.1 K/UL (ref 0–0.2)
BASOPHILS RELATIVE PERCENT: 0.9 %
BILIRUBIN URINE: NEGATIVE
BLOOD, URINE: NEGATIVE
BUN BLDV-MCNC: 12 MG/DL (ref 7–20)
CALCIUM SERPL-MCNC: 9.2 MG/DL (ref 8.3–10.6)
CHLORIDE BLD-SCNC: 102 MMOL/L (ref 99–110)
CLARITY: CLEAR
CO2: 22 MMOL/L (ref 21–32)
COLOR: YELLOW
CREAT SERPL-MCNC: 0.6 MG/DL (ref 0.6–1.2)
EOSINOPHILS ABSOLUTE: 0 K/UL (ref 0–0.6)
EOSINOPHILS RELATIVE PERCENT: 0.2 %
EPITHELIAL CELLS, UA: ABNORMAL /HPF
GFR AFRICAN AMERICAN: >60
GFR NON-AFRICAN AMERICAN: >60
GLUCOSE BLD-MCNC: 127 MG/DL (ref 70–99)
GLUCOSE URINE: NEGATIVE MG/DL
HCT VFR BLD CALC: 40.8 % (ref 36–48)
HEMOGLOBIN: 13.9 G/DL (ref 12–16)
KETONES, URINE: NEGATIVE MG/DL
LEUKOCYTE ESTERASE, URINE: ABNORMAL
LYMPHOCYTES ABSOLUTE: 1.1 K/UL (ref 1–5.1)
LYMPHOCYTES RELATIVE PERCENT: 10.5 %
MCH RBC QN AUTO: 30.9 PG (ref 26–34)
MCHC RBC AUTO-ENTMCNC: 34.1 G/DL (ref 31–36)
MCV RBC AUTO: 90.8 FL (ref 80–100)
MICROSCOPIC EXAMINATION: YES
MONOCYTES ABSOLUTE: 1.2 K/UL (ref 0–1.3)
MONOCYTES RELATIVE PERCENT: 11.5 %
NEUTROPHILS ABSOLUTE: 8.2 K/UL (ref 1.7–7.7)
NEUTROPHILS RELATIVE PERCENT: 76.9 %
NITRITE, URINE: NEGATIVE
PDW BLD-RTO: 12 % (ref 12.4–15.4)
PH UA: 6.5 (ref 5–8)
PLATELET # BLD: 266 K/UL (ref 135–450)
PMV BLD AUTO: 7.8 FL (ref 5–10.5)
POTASSIUM REFLEX MAGNESIUM: 4.1 MMOL/L (ref 3.5–5.1)
PROTEIN UA: NEGATIVE MG/DL
RBC # BLD: 4.49 M/UL (ref 4–5.2)
RBC UA: ABNORMAL /HPF (ref 0–2)
SODIUM BLD-SCNC: 138 MMOL/L (ref 136–145)
SPECIFIC GRAVITY UA: <=1.005 (ref 1–1.03)
URINE TYPE: ABNORMAL
UROBILINOGEN, URINE: 1 E.U./DL
WBC # BLD: 10.6 K/UL (ref 4–11)
WBC UA: ABNORMAL /HPF (ref 0–5)

## 2019-06-04 PROCEDURE — 74177 CT ABD & PELVIS W/CONTRAST: CPT

## 2019-06-04 PROCEDURE — 96375 TX/PRO/DX INJ NEW DRUG ADDON: CPT

## 2019-06-04 PROCEDURE — 81001 URINALYSIS AUTO W/SCOPE: CPT

## 2019-06-04 PROCEDURE — 96367 TX/PROPH/DG ADDL SEQ IV INF: CPT

## 2019-06-04 PROCEDURE — 36415 COLL VENOUS BLD VENIPUNCTURE: CPT

## 2019-06-04 PROCEDURE — 96361 HYDRATE IV INFUSION ADD-ON: CPT

## 2019-06-04 PROCEDURE — 96365 THER/PROPH/DIAG IV INF INIT: CPT

## 2019-06-04 PROCEDURE — 2580000003 HC RX 258: Performed by: EMERGENCY MEDICINE

## 2019-06-04 PROCEDURE — 6360000002 HC RX W HCPCS: Performed by: EMERGENCY MEDICINE

## 2019-06-04 PROCEDURE — 99284 EMERGENCY DEPT VISIT MOD MDM: CPT

## 2019-06-04 PROCEDURE — 80048 BASIC METABOLIC PNL TOTAL CA: CPT

## 2019-06-04 PROCEDURE — 6360000004 HC RX CONTRAST MEDICATION: Performed by: EMERGENCY MEDICINE

## 2019-06-04 PROCEDURE — 85025 COMPLETE CBC W/AUTO DIFF WBC: CPT

## 2019-06-04 RX ORDER — AZITHROMYCIN 250 MG/1
TABLET, FILM COATED ORAL
Qty: 1 PACKET | Refills: 0 | Status: SHIPPED | OUTPATIENT
Start: 2019-06-04 | End: 2019-06-14

## 2019-06-04 RX ORDER — CEFUROXIME AXETIL 250 MG/1
250 TABLET ORAL 2 TIMES DAILY
Qty: 20 TABLET | Refills: 0 | Status: SHIPPED | OUTPATIENT
Start: 2019-06-04 | End: 2019-06-14

## 2019-06-04 RX ORDER — SODIUM CHLORIDE, SODIUM LACTATE, POTASSIUM CHLORIDE, CALCIUM CHLORIDE 600; 310; 30; 20 MG/100ML; MG/100ML; MG/100ML; MG/100ML
1000 INJECTION, SOLUTION INTRAVENOUS ONCE
Status: COMPLETED | OUTPATIENT
Start: 2019-06-04 | End: 2019-06-04

## 2019-06-04 RX ORDER — SODIUM CHLORIDE 9 MG/ML
INJECTION, SOLUTION INTRAVENOUS
Status: DISCONTINUED
Start: 2019-06-04 | End: 2019-06-04 | Stop reason: HOSPADM

## 2019-06-04 RX ORDER — KETOROLAC TROMETHAMINE 30 MG/ML
15 INJECTION, SOLUTION INTRAMUSCULAR; INTRAVENOUS ONCE
Status: COMPLETED | OUTPATIENT
Start: 2019-06-04 | End: 2019-06-04

## 2019-06-04 RX ORDER — ONDANSETRON 2 MG/ML
4 INJECTION INTRAMUSCULAR; INTRAVENOUS ONCE
Status: COMPLETED | OUTPATIENT
Start: 2019-06-04 | End: 2019-06-04

## 2019-06-04 RX ADMIN — KETOROLAC TROMETHAMINE 15 MG: 30 INJECTION, SOLUTION INTRAMUSCULAR at 09:59

## 2019-06-04 RX ADMIN — SODIUM CHLORIDE, POTASSIUM CHLORIDE, SODIUM LACTATE AND CALCIUM CHLORIDE 1000 ML: 600; 310; 30; 20 INJECTION, SOLUTION INTRAVENOUS at 09:59

## 2019-06-04 RX ADMIN — CEFTRIAXONE 1 G: 1 INJECTION, POWDER, FOR SOLUTION INTRAMUSCULAR; INTRAVENOUS at 16:10

## 2019-06-04 RX ADMIN — IOPAMIDOL 80 ML: 755 INJECTION, SOLUTION INTRAVENOUS at 14:25

## 2019-06-04 RX ADMIN — AZITHROMYCIN DIHYDRATE 500 MG: 500 INJECTION, POWDER, LYOPHILIZED, FOR SOLUTION INTRAVENOUS at 17:09

## 2019-06-04 RX ADMIN — ONDANSETRON 4 MG: 2 INJECTION INTRAMUSCULAR; INTRAVENOUS at 09:59

## 2019-06-04 ASSESSMENT — PAIN DESCRIPTION - ORIENTATION: ORIENTATION: RIGHT

## 2019-06-04 ASSESSMENT — PAIN DESCRIPTION - FREQUENCY: FREQUENCY: CONTINUOUS

## 2019-06-04 ASSESSMENT — PAIN DESCRIPTION - PAIN TYPE: TYPE: ACUTE PAIN

## 2019-06-04 ASSESSMENT — PAIN DESCRIPTION - DESCRIPTORS: DESCRIPTORS: THROBBING

## 2019-06-04 ASSESSMENT — PAIN DESCRIPTION - PROGRESSION: CLINICAL_PROGRESSION: GRADUALLY WORSENING

## 2019-06-04 ASSESSMENT — PAIN DESCRIPTION - ONSET: ONSET: PROGRESSIVE

## 2019-06-04 ASSESSMENT — PAIN - FUNCTIONAL ASSESSMENT: PAIN_FUNCTIONAL_ASSESSMENT: PREVENTS OR INTERFERES SOME ACTIVE ACTIVITIES AND ADLS

## 2019-06-04 ASSESSMENT — PAIN SCALES - GENERAL: PAINLEVEL_OUTOF10: 8

## 2019-06-04 ASSESSMENT — PAIN DESCRIPTION - LOCATION: LOCATION: FLANK

## 2019-06-04 NOTE — ED PROVIDER NOTES
Case is turned over to me by day physician. Patient found to have a right posterior lower lobe consolidation. Rocephin and Zithromax was started. Call was placed the family physician. Dcyg88=4 for age    On exam breath sounds are equal bilaterally without wheezes abdomen was soft without significant peritoneal findings or point tenderness. No CVA tenderness    Imp; pneumonia    Because of the consolidation I discussed case with family physician. It is odd that she is not showing a white count or symptoms. The CT picked up a consolidation posterior inferior right lobe and recommended a follow-up PA lateral chest in 1 week and CT chest in approximately 1 month for following this finding. This is discussed with family physician. Discussed with patient.        Annette Syed MD  06/04/19 [id number]       Annette Syed MD  06/04/19 [id number]

## 2019-06-04 NOTE — ED TRIAGE NOTES
Patient is a 74year old female who presents to the ED from home with c/o right sided flank pain since yesterday. Patient has a history of kidney stones. Patient is alert and oriented with even and unlabored respirations.

## 2019-06-04 NOTE — ED PROVIDER NOTES
[address] [facility]          ATTENDING PHYSICIAN NOTE       Date of evaluation: 6/4/2019    Chief Complaint     Flank Pain (right sided)    History of Present Illness     Nichol Jaime is a 74 y.o. female past medical history nephrolithiasis in 2013 who presents with right-sided flank pain. She notes that last night started and has been progressive, 8 out of 10 sharp pain in the right lower flank. Denies any dysuria or hematuria. Had a history of previous episode in 2013 which presented here head CT scan at that time which showed likely a passed kidney stone. She denies any chest pain shortness of breath endorses some mild nausea with this. Review of Systems     Review of Systems  A full 10 point review of systems was obtained. Pertinent positives and negatives as documented in the HPI, otherwise all other systems were reviewed and were negative. Past Medical, Surgical, Family, and Social History     She has a past medical history of Vernon's esophagus, Cancer ([facility] [address] Colon polyp, Gallstones, GERD (gastroesophageal reflux disease), Hypertension, Kidney stone, Osteoporoses, Pregnancies, and Tubular adenoma nos. She has a past surgical history that includes Cholecystectomy (1988); bladder repair (9/09); Colonoscopy (2008,3/9/2017); Hysterectomy; Appendectomy; Upper gastrointestinal endoscopy (2008); and Breast surgery (Left, 10/26/2017). Her family history includes Breast Cancer in her mother; Cancer (age of onset: 61) in her sister; Cancer (age of onset: 67) in her mother; Cirrhosis in her mother; Coronary Art Dis in her mother and sister; Elevated Lipids in her sister; Heart Disease in her mother and sister; High Blood Pressure in her sister and sister; High Cholesterol in her sister; Other in her father and sister; Stroke in her father and sister. She reports that she has never smoked.  She has never used smokeless tobacco. She reports that she drinks about 0.6 oz of Hx, Past Surgical Hx, Social Hx,Allergies, and Family Hx were reviewed. The patient was given the following medications:  No orders of the defined types were placed in this encounter. CONSULTS:  None    MEDICAL DECISIONMAKING / ASSESSMENT / Chano Claudio is a 74 y.o. female past medical history of presumed nephrolithiasis on the right presents today with right-sided flank pain and some mild right lower quadrant tenderness. Here she is given Zofran and fluids and Toradol had significant relief of her pain but UA came back with 6-10 white blood cells but no red blood cells. Reevaluation was having more right lower quadrant tenderness. She states she had her gallbladder out but was not sure if she had her appendix out. Family at bedside did not think she had her appendix out and on previous CTs from 6 years ago there is no mention. Because of this she was put in for CT abdomen and pelvis tonight for appendicitis possible stone. She's been signed out to my oncoming colleague, Dr. Jelly Fernandes pending CT abdomen pelvis. Is normal she'll likely be able to be discharged home on antibiotics for UTI.          Meri Chapman MD  06/04/19 0949

## 2019-06-06 ENCOUNTER — TELEPHONE (OUTPATIENT)
Dept: FAMILY MEDICINE CLINIC | Age: 74
End: 2019-06-06

## 2019-06-06 NOTE — TELEPHONE ENCOUNTER
Pt was at ER on Tuesday and was diagnose with puenomia and now pain has come back on right lower side.  Pls contact pt at ph# [phone]

## 2019-06-21 ENCOUNTER — OFFICE VISIT (OUTPATIENT)
Dept: FAMILY MEDICINE CLINIC | Age: 74
End: 2019-06-21
Payer: MEDICARE

## 2019-06-21 VITALS
BODY MASS INDEX: 26.08 KG/M2 | DIASTOLIC BLOOD PRESSURE: 80 MMHG | HEART RATE: 92 BPM | WEIGHT: 147.2 LBS | OXYGEN SATURATION: 97 % | HEIGHT: 63 IN | SYSTOLIC BLOOD PRESSURE: 140 MMHG

## 2019-06-21 DIAGNOSIS — J18.9 PNEUMONIA OF RIGHT LOWER LOBE DUE TO INFECTIOUS ORGANISM: ICD-10-CM

## 2019-06-21 PROCEDURE — 4040F PNEUMOC VAC/ADMIN/RCVD: CPT | Performed by: FAMILY MEDICINE

## 2019-06-21 PROCEDURE — G8427 DOCREV CUR MEDS BY ELIG CLIN: HCPCS | Performed by: FAMILY MEDICINE

## 2019-06-21 PROCEDURE — 1123F ACP DISCUSS/DSCN MKR DOCD: CPT | Performed by: FAMILY MEDICINE

## 2019-06-21 PROCEDURE — G8399 PT W/DXA RESULTS DOCUMENT: HCPCS | Performed by: FAMILY MEDICINE

## 2019-06-21 PROCEDURE — G8419 CALC BMI OUT NRM PARAM NOF/U: HCPCS | Performed by: FAMILY MEDICINE

## 2019-06-21 PROCEDURE — G8598 ASA/ANTIPLAT THER USED: HCPCS | Performed by: FAMILY MEDICINE

## 2019-06-21 PROCEDURE — 99213 OFFICE O/P EST LOW 20 MIN: CPT | Performed by: FAMILY MEDICINE

## 2019-06-21 PROCEDURE — 1090F PRES/ABSN URINE INCON ASSESS: CPT | Performed by: FAMILY MEDICINE

## 2019-06-21 PROCEDURE — 3017F COLORECTAL CA SCREEN DOC REV: CPT | Performed by: FAMILY MEDICINE

## 2019-06-21 PROCEDURE — 1036F TOBACCO NON-USER: CPT | Performed by: FAMILY MEDICINE

## 2019-06-21 ASSESSMENT — ENCOUNTER SYMPTOMS
HEMOPTYSIS: 0
COUGH: 0
SPUTUM PRODUCTION: 0
SHORTNESS OF BREATH: 0
DIFFICULTY BREATHING: 0
FREQUENT THROAT CLEARING: 0
SORE THROAT: 0
TROUBLE SWALLOWING: 0
CHEST TIGHTNESS: 0
WHEEZING: 0
HEARTBURN: 0
RHINORRHEA: 0
HOARSE VOICE: 0

## 2019-06-21 NOTE — PROGRESS NOTES
Subjective:     Patient ID:Amairani Saunders is a 74 y.o. female. Pneumonia   There is no chest tightness, cough, difficulty breathing, frequent throat clearing, hemoptysis, hoarse voice, shortness of breath, sputum production or wheezing. Primary symptoms comments: R flank tenderness--CT showed RLL pneumonia. This is a new problem. The current episode started 1 to 4 weeks ago. The problem occurs constantly. The problem has been unchanged. Pertinent negatives include no appetite change, chest pain, dyspnea on exertion, ear congestion, ear pain, fever, headaches, heartburn, malaise/fatigue, myalgias, nasal congestion, orthopnea, PND, postnasal drip, rhinorrhea, sneezing, sore throat, sweats, trouble swallowing or weight loss. Her symptoms are aggravated by nothing. Relieved by: antibiotics. She reports significant improvement on treatment. Her past medical history is significant for pneumonia. There is no history of asthma, bronchiectasis, bronchitis, COPD or emphysema. Allergies   Allergen Reactions    Sulfa Antibiotics     Sulfamethoxazole-Trimethoprim Other (See Comments)    Vicodin [Hydrocodone-Acetaminophen] Nausea And Vomiting       Current Outpatient Medications   Medication Sig Dispense Refill    omeprazole (PRILOSEC) 10 MG delayed release capsule Take 1 capsule by mouth Daily 90 capsule 3    alendronate (FOSAMAX) 70 MG tablet Take 1 tablet by mouth every 7 days 4 tablet 5    aspirin 81 MG tablet Take 1 tablet by mouth daily 30 tablet 3    sertraline (ZOLOFT) 50 MG tablet TAKE 1 TABLET BY MOUTH ONCE DAILY 30 tablet 5    Psyllium (METAMUCIL PO) Take by mouth      calcium carbonate 600 MG TABS tablet Take 1 tablet by mouth daily      Cholecalciferol (VITAMIN D-3) 1000 UNITS CAPS Take by mouth      Ascorbic Acid (VITAMIN C) 250 MG tablet Take 250 mg by mouth daily.  Multiple Vitamin (MULTIVITAMIN PO) Take  by mouth. No current facility-administered medications for this visit. Facility-Administered Medications Ordered in Other Visits   Medication Dose Route Frequency Provider Last Rate Last Dose    lactated ringers infusion   Intravenous Continuous Callie Ghosh MD        lactated ringers infusion  125 mL/hr Intravenous Continuous Tawanda Hall MD   Stopped at 10/26/17 1231    sodium chloride flush 0.9 % injection 10 mL  10 mL Intravenous 2 times per day Tawanda Hall MD        sodium chloride flush 0.9 % injection 10 mL  10 mL Intravenous PRN Tawanda Hall MD        glycopyrrolate (ROBINUL) injection 0.2 mg  0.2 mg Intravenous Once Tawanda Hall MD        fentaNYL (SUBLIMAZE) injection 25 mcg  25 mcg Intravenous Q5 Min PRN Tawanda Hall MD        HYDROmorphone (DILAUDID) injection 0.5 mg  0.5 mg Intravenous Q5 Min PRN Tawanda Hall MD        labetalol (NORMODYNE;TRANDATE) injection 5 mg  5 mg Intravenous Q5 Min PRN Tawanda Hall MD        hydrALAZINE (APRESOLINE) injection 5 mg  5 mg Intravenous Q5 Min PRN Tawanda Hall MD        meperidine (DEMEROL) injection 12.5 mg  12.5 mg Intravenous Q5 Min PRN Tawanda Hall MD           Past Medical History:   Diagnosis Date    Vernon's esophagus     Cancer [facility])     breast    Colon polyp 1/15/2016    Gallstones     GERD (gastroesophageal reflux disease)     Hypertension     Kidney stone     Osteoporoses     Pregnancies     2- vaginal deliveries- 2    Tubular adenoma nos        Past Surgical History:   Procedure Laterality Date    APPENDECTOMY      BLADDER REPAIR  9/09    cah    BREAST SURGERY Left 10/26/2017    Left breastpartial mastectomy, needle localization, lymph node dissection    CHOLECYSTECTOMY  1988    COLONOSCOPY  2008,3/9/2017    polyp    HYSTERECTOMY      UPPER GASTROINTESTINAL ENDOSCOPY  2008       Social History     Socioeconomic History    Marital status:      Spouse name: Not on file    Number of children: 2    Years of education: Not on file   Cara Administered    Influenza Vaccine, unspecified formulation 10/16/2016    Influenza Virus Vaccine 10/10/2014    Influenza, High Dose (Fluzone 65 yrs and older) 11/22/2017, 10/12/2018    Pneumococcal Conjugate 13-valent ([id number]) 01/19/2016    Pneumococcal Polysaccharide ([id number]) 10/15/2014    Tdap (Boostrix, Adacel) 01/04/2019    Zoster Live (Zostavax) 06/10/2016       Review of Systems  Review of Systems   Constitutional: Negative for appetite change, fever, malaise/fatigue and weight loss. HENT: Negative for ear pain, hoarse voice, postnasal drip, rhinorrhea, sneezing, sore throat and trouble swallowing. Respiratory: Negative for cough, hemoptysis, sputum production, shortness of breath and wheezing. Cardiovascular: Negative for chest pain, dyspnea on exertion and PND. Gastrointestinal: Negative for heartburn. Musculoskeletal: Negative for myalgias. Neurological: Negative for headaches. Objective:   Physical Exam  Physical Exam   Constitutional: She is oriented to person, place, and time. She appears well-developed and well-nourished. No distress. HENT:   Mouth/Throat: Oropharynx is clear and moist.   Eyes: Pupils are equal, round, and reactive to light. Conjunctivae are normal.   Neck: No JVD present. No tracheal deviation present. No thyromegaly present. Cardiovascular: Normal rate, regular rhythm, normal heart sounds and intact distal pulses. Exam reveals no gallop. No murmur heard. Pulmonary/Chest: Effort normal and breath sounds normal. No stridor. No respiratory distress. She has no wheezes. She has no rales. She exhibits no tenderness. Abdominal: Soft. Bowel sounds are normal. She exhibits no distension and no mass. There is no tenderness. Musculoskeletal: She exhibits no edema or tenderness. Lymphadenopathy:     She has no cervical adenopathy. Neurological: She is alert and oriented to person, place, and time. She displays normal reflexes.  No cranial nerve deficit. She exhibits normal muscle tone. Coordination normal.   Skin: Skin is warm and dry. No rash noted. No erythema. No pallor. Psychiatric: She has a normal mood and affect. Her behavior is normal. Judgment and thought content normal.   Vitals reviewed.     Admission on 06/04/2019, Discharged on 06/04/2019   Component Date Value Ref Range Status    Color, UA 06/04/2019 Yellow  Straw/Yellow Final    Clarity, UA 06/04/2019 Clear  Clear Final    Glucose, Ur 06/04/2019 Negative  Negative mg/dL Final    Bilirubin Urine 06/04/2019 Negative  Negative Final    Ketones, Urine 06/04/2019 Negative  Negative mg/dL Final    Specific Gravity, UA 06/04/2019 <=1.005  1.005 - 1.030 Final    Blood, Urine 06/04/2019 Negative  Negative Final    pH, UA 06/04/2019 6.5  5.0 - 8.0 Final    Protein, UA 06/04/2019 Negative  Negative mg/dL Final    Urobilinogen, Urine 06/04/2019 1.0  <2.0 E.U./dL Final    Nitrite, Urine 06/04/2019 Negative  Negative Final    Leukocyte Esterase, Urine 06/04/2019 TRACE* Negative Final    Microscopic Examination 06/04/2019 YES   Final    Urine Type 06/04/2019 Not Specified   Final    WBC 06/04/2019 10.6  4.0 - 11.0 K/uL Final    RBC 06/04/2019 4.49  4.00 - 5.20 M/uL Final    Hemoglobin 06/04/2019 13.9  12.0 - 16.0 g/dL Final    Hematocrit 06/04/2019 40.8  36.0 - 48.0 % Final    MCV 06/04/2019 90.8  80.0 - 100.0 fL Final    MCH 06/04/2019 30.9  26.0 - 34.0 pg Final    MCHC 06/04/2019 34.1  31.0 - 36.0 g/dL Final    RDW 06/04/2019 12.0* 12.4 - 15.4 % Final    Platelets 43/59/0953 266  135 - 450 K/uL Final    MPV 06/04/2019 7.8  5.0 - 10.5 fL Final    Neutrophils % 06/04/2019 76.9  % Final    Lymphocytes % 06/04/2019 10.5  % Final    Monocytes % 06/04/2019 11.5  % Final    Eosinophils % 06/04/2019 0.2  % Final    Basophils % 06/04/2019 0.9  % Final    Neutrophils # 06/04/2019 8.2* 1.7 - 7.7 K/uL Final    Lymphocytes # 06/04/2019 1.1  1.0 - 5.1 K/uL Final    Monocytes #

## 2019-07-15 ENCOUNTER — TELEPHONE (OUTPATIENT)
Dept: FAMILY MEDICINE CLINIC | Age: 74
End: 2019-07-15

## 2019-07-15 DIAGNOSIS — I63.439 CEREBROVASCULAR ACCIDENT (CVA) DUE TO EMBOLISM OF POSTERIOR CEREBRAL ARTERY WITH INFARCTIONS OF BOTH OCCIPITAL LOBES (HCC): Primary | ICD-10-CM

## 2019-07-17 ENCOUNTER — HOSPITAL ENCOUNTER (OUTPATIENT)
Dept: CT IMAGING | Age: 74
Discharge: HOME | End: 2019-07-17
Payer: MEDICARE

## 2019-07-17 DIAGNOSIS — J18.9 PNEUMONIA OF RIGHT LOWER LOBE DUE TO INFECTIOUS ORGANISM: ICD-10-CM

## 2019-07-17 LAB
GFR AFRICAN AMERICAN: >60
GFR NON-AFRICAN AMERICAN: >60
PERFORMED ON: ABNORMAL
POC CREATININE: 0.5 MG/DL (ref 0.6–1.2)
POC SAMPLE TYPE: ABNORMAL

## 2019-07-17 PROCEDURE — 6360000004 HC RX CONTRAST MEDICATION: Performed by: FAMILY MEDICINE

## 2019-07-17 PROCEDURE — 82565 ASSAY OF CREATININE: CPT

## 2019-07-17 PROCEDURE — 71260 CT THORAX DX C+: CPT

## 2019-07-17 RX ADMIN — IOPAMIDOL 80 ML: 755 INJECTION, SOLUTION INTRAVENOUS at 14:43

## 2019-08-19 RX ORDER — ALENDRONATE SODIUM 70 MG/1
TABLET ORAL
Qty: 4 TABLET | Refills: 5 | Status: ON HOLD | OUTPATIENT
Start: 2019-08-19 | End: 2019-09-24 | Stop reason: HOSPADM

## 2019-09-05 ENCOUNTER — OFFICE VISIT (OUTPATIENT)
Dept: INTERNAL MEDICINE CLINIC | Age: 74
End: 2019-09-05
Payer: MEDICARE

## 2019-09-05 VITALS
WEIGHT: 145 LBS | BODY MASS INDEX: 25.69 KG/M2 | HEIGHT: 63 IN | OXYGEN SATURATION: 94 % | DIASTOLIC BLOOD PRESSURE: 66 MMHG | HEART RATE: 91 BPM | SYSTOLIC BLOOD PRESSURE: 126 MMHG

## 2019-09-05 DIAGNOSIS — R35.0 FREQUENCY OF URINATION: Primary | ICD-10-CM

## 2019-09-05 DIAGNOSIS — N30.90 CYSTITIS: ICD-10-CM

## 2019-09-05 LAB
BILIRUBIN, POC: NORMAL
BLOOD URINE, POC: NORMAL
CLARITY, POC: NORMAL
COLOR, POC: YELLOW
GLUCOSE URINE, POC: NORMAL
KETONES, POC: NORMAL
LEUKOCYTE EST, POC: NORMAL
NITRITE, POC: NORMAL
PH, POC: 5
PROTEIN, POC: NORMAL
SPECIFIC GRAVITY, POC: 1.03
UROBILINOGEN, POC: NORMAL

## 2019-09-05 PROCEDURE — 81002 URINALYSIS NONAUTO W/O SCOPE: CPT | Performed by: NURSE PRACTITIONER

## 2019-09-05 PROCEDURE — G8399 PT W/DXA RESULTS DOCUMENT: HCPCS | Performed by: NURSE PRACTITIONER

## 2019-09-05 PROCEDURE — 1036F TOBACCO NON-USER: CPT | Performed by: NURSE PRACTITIONER

## 2019-09-05 PROCEDURE — 4040F PNEUMOC VAC/ADMIN/RCVD: CPT | Performed by: NURSE PRACTITIONER

## 2019-09-05 PROCEDURE — G8598 ASA/ANTIPLAT THER USED: HCPCS | Performed by: NURSE PRACTITIONER

## 2019-09-05 PROCEDURE — G8427 DOCREV CUR MEDS BY ELIG CLIN: HCPCS | Performed by: NURSE PRACTITIONER

## 2019-09-05 PROCEDURE — 1123F ACP DISCUSS/DSCN MKR DOCD: CPT | Performed by: NURSE PRACTITIONER

## 2019-09-05 PROCEDURE — G8419 CALC BMI OUT NRM PARAM NOF/U: HCPCS | Performed by: NURSE PRACTITIONER

## 2019-09-05 PROCEDURE — 99213 OFFICE O/P EST LOW 20 MIN: CPT | Performed by: NURSE PRACTITIONER

## 2019-09-05 PROCEDURE — 1090F PRES/ABSN URINE INCON ASSESS: CPT | Performed by: NURSE PRACTITIONER

## 2019-09-05 RX ORDER — CIPROFLOXACIN 250 MG/1
250 TABLET, FILM COATED ORAL 2 TIMES DAILY
Qty: 10 TABLET | Refills: 0 | Status: ON HOLD | OUTPATIENT
Start: 2019-09-05 | End: 2019-09-13 | Stop reason: HOSPADM

## 2019-09-05 NOTE — PROGRESS NOTES
Subjective:      Patient ID: Reynaldo Diaz is a 66 y.o. female. Chief Complaint   Patient presents with    Urinary Tract Infection     dysuria, frequency      HPI   Roseann Addy is here for dysuria, frequency, and urgency. Symptoms started a few days ago. She denies pelvic pain. Has had UTIs a few times in the past year. No fevers No flank pain. No visible hematuria. Review of Systems   Constitutional: Negative for chills, fatigue and fever. HENT: Negative for congestion, sinus pressure and sinus pain. Respiratory: Negative for cough, shortness of breath and wheezing. Cardiovascular: Negative for chest pain and palpitations. Gastrointestinal: Negative for abdominal pain, constipation and diarrhea. Genitourinary: Positive for dysuria, frequency and urgency. Negative for flank pain, hematuria and pelvic pain. Musculoskeletal: Negative for arthralgias, back pain and myalgias. Skin: Negative for color change, pallor and rash. Neurological: Negative for dizziness, syncope, weakness, light-headedness and headaches. Psychiatric/Behavioral: Negative for behavioral problems, confusion and sleep disturbance. The patient is not nervous/anxious. Objective:   Physical Exam   Constitutional: She is oriented to person, place, and time. She appears well-developed and well-nourished. HENT:   Head: Normocephalic and atraumatic. Mouth/Throat: Oropharynx is clear and moist.   Eyes: Pupils are equal, round, and reactive to light. Conjunctivae and EOM are normal.   Neck: Normal range of motion. Neck supple. Cardiovascular: Normal rate, regular rhythm and normal heart sounds. Pulmonary/Chest: Effort normal and breath sounds normal. No respiratory distress. She has no wheezes. Musculoskeletal: Normal range of motion. She exhibits no edema or deformity. Neurological: She is alert and oriented to person, place, and time. Skin: Skin is warm and dry. Capillary refill takes less than 2 seconds.

## 2019-09-06 ENCOUNTER — APPOINTMENT (OUTPATIENT)
Dept: CT IMAGING | Age: 74
DRG: 065 | End: 2019-09-06
Payer: MEDICARE

## 2019-09-06 ENCOUNTER — HOSPITAL ENCOUNTER (INPATIENT)
Age: 74
LOS: 7 days | Discharge: NURSING, REHAB OR LONG-TERM CARE | DRG: 065 | End: 2019-09-13
Attending: EMERGENCY MEDICINE | Admitting: FAMILY MEDICINE
Payer: MEDICARE

## 2019-09-06 ENCOUNTER — APPOINTMENT (OUTPATIENT)
Dept: MRI IMAGING | Age: 74
DRG: 065 | End: 2019-09-06
Payer: MEDICARE

## 2019-09-06 ENCOUNTER — APPOINTMENT (OUTPATIENT)
Dept: GENERAL RADIOLOGY | Age: 74
DRG: 065 | End: 2019-09-06
Payer: MEDICARE

## 2019-09-06 DIAGNOSIS — I63.9 CEREBROVASCULAR ACCIDENT (CVA), UNSPECIFIED MECHANISM (HCC): Primary | ICD-10-CM

## 2019-09-06 PROBLEM — N30.90 CYSTITIS: Status: ACTIVE | Noted: 2019-09-06

## 2019-09-06 LAB
A/G RATIO: 1.4 (ref 1.1–2.2)
ALBUMIN SERPL-MCNC: 4.6 G/DL (ref 3.4–5)
ALP BLD-CCNC: 63 U/L (ref 40–129)
ALT SERPL-CCNC: 17 U/L (ref 10–40)
ANION GAP SERPL CALCULATED.3IONS-SCNC: 11 MMOL/L (ref 3–16)
AST SERPL-CCNC: 18 U/L (ref 15–37)
BASE EXCESS VENOUS: 0 (ref -3–3)
BASOPHILS ABSOLUTE: 0.1 K/UL (ref 0–0.2)
BASOPHILS RELATIVE PERCENT: 1.2 %
BILIRUB SERPL-MCNC: 0.4 MG/DL (ref 0–1)
BUN BLDV-MCNC: 21 MG/DL (ref 7–20)
CALCIUM SERPL-MCNC: 9.8 MG/DL (ref 8.3–10.6)
CHLORIDE BLD-SCNC: 104 MMOL/L (ref 99–110)
CO2: 27 MMOL/L (ref 21–32)
CREAT SERPL-MCNC: 0.8 MG/DL (ref 0.6–1.2)
EKG ATRIAL RATE: 74 BPM
EKG DIAGNOSIS: NORMAL
EKG P-R INTERVAL: 170 MS
EKG Q-T INTERVAL: 424 MS
EKG QRS DURATION: 82 MS
EKG QTC CALCULATION (BAZETT): 470 MS
EKG R AXIS: -24 DEGREES
EKG T AXIS: 90 DEGREES
EKG VENTRICULAR RATE: 74 BPM
EOSINOPHILS ABSOLUTE: 0.1 K/UL (ref 0–0.6)
EOSINOPHILS RELATIVE PERCENT: 1.3 %
GFR AFRICAN AMERICAN: >60
GFR NON-AFRICAN AMERICAN: >60
GLOBULIN: 3.2 G/DL
GLUCOSE BLD-MCNC: 112 MG/DL (ref 70–99)
GLUCOSE BLD-MCNC: 124 MG/DL (ref 70–99)
HCO3 VENOUS: 25.3 MMOL/L (ref 23–29)
HCT VFR BLD CALC: 44.6 % (ref 36–48)
HEMOGLOBIN: 15 G/DL (ref 12–16)
INR BLD: 0.96 (ref 0.86–1.14)
LACTATE: 1.28 MMOL/L (ref 0.4–2)
LYMPHOCYTES ABSOLUTE: 1.8 K/UL (ref 1–5.1)
LYMPHOCYTES RELATIVE PERCENT: 19.4 %
MCH RBC QN AUTO: 31 PG (ref 26–34)
MCHC RBC AUTO-ENTMCNC: 33.7 G/DL (ref 31–36)
MCV RBC AUTO: 92.1 FL (ref 80–100)
MONOCYTES ABSOLUTE: 0.8 K/UL (ref 0–1.3)
MONOCYTES RELATIVE PERCENT: 9 %
NEUTROPHILS ABSOLUTE: 6.3 K/UL (ref 1.7–7.7)
NEUTROPHILS RELATIVE PERCENT: 69.1 %
O2 SAT, VEN: 73 %
PCO2, VEN: 45.4 MM HG (ref 40–50)
PDW BLD-RTO: 12.9 % (ref 12.4–15.4)
PERFORMED ON: ABNORMAL
PERFORMED ON: NORMAL
PH VENOUS: 7.35 (ref 7.35–7.45)
PLATELET # BLD: 284 K/UL (ref 135–450)
PMV BLD AUTO: 7.8 FL (ref 5–10.5)
PO2, VEN: 40 MM HG
POC SAMPLE TYPE: NORMAL
POTASSIUM REFLEX MAGNESIUM: 4.3 MMOL/L (ref 3.5–5.1)
PRO-BNP: 108 PG/ML (ref 0–449)
PROTHROMBIN TIME: 11 SEC (ref 9.8–13)
RBC # BLD: 4.84 M/UL (ref 4–5.2)
SODIUM BLD-SCNC: 142 MMOL/L (ref 136–145)
TCO2 CALC VENOUS: 27 MMOL/L
TOTAL PROTEIN: 7.8 G/DL (ref 6.4–8.2)
TROPONIN: <0.01 NG/ML
WBC # BLD: 9.2 K/UL (ref 4–11)

## 2019-09-06 PROCEDURE — 70498 CT ANGIOGRAPHY NECK: CPT

## 2019-09-06 PROCEDURE — 70450 CT HEAD/BRAIN W/O DYE: CPT

## 2019-09-06 PROCEDURE — 72141 MRI NECK SPINE W/O DYE: CPT

## 2019-09-06 PROCEDURE — 1200000000 HC SEMI PRIVATE

## 2019-09-06 PROCEDURE — 36415 COLL VENOUS BLD VENIPUNCTURE: CPT

## 2019-09-06 PROCEDURE — 80053 COMPREHEN METABOLIC PANEL: CPT

## 2019-09-06 PROCEDURE — 93005 ELECTROCARDIOGRAM TRACING: CPT | Performed by: EMERGENCY MEDICINE

## 2019-09-06 PROCEDURE — 85025 COMPLETE CBC W/AUTO DIFF WBC: CPT

## 2019-09-06 PROCEDURE — 70551 MRI BRAIN STEM W/O DYE: CPT

## 2019-09-06 PROCEDURE — 82803 BLOOD GASES ANY COMBINATION: CPT

## 2019-09-06 PROCEDURE — 6360000004 HC RX CONTRAST MEDICATION: Performed by: EMERGENCY MEDICINE

## 2019-09-06 PROCEDURE — 71045 X-RAY EXAM CHEST 1 VIEW: CPT

## 2019-09-06 PROCEDURE — 83605 ASSAY OF LACTIC ACID: CPT

## 2019-09-06 PROCEDURE — 6370000000 HC RX 637 (ALT 250 FOR IP): Performed by: EMERGENCY MEDICINE

## 2019-09-06 PROCEDURE — 84484 ASSAY OF TROPONIN QUANT: CPT

## 2019-09-06 PROCEDURE — 83880 ASSAY OF NATRIURETIC PEPTIDE: CPT

## 2019-09-06 PROCEDURE — 99222 1ST HOSP IP/OBS MODERATE 55: CPT | Performed by: FAMILY MEDICINE

## 2019-09-06 PROCEDURE — 99291 CRITICAL CARE FIRST HOUR: CPT

## 2019-09-06 PROCEDURE — 2580000003 HC RX 258

## 2019-09-06 PROCEDURE — 6370000000 HC RX 637 (ALT 250 FOR IP)

## 2019-09-06 PROCEDURE — 70496 CT ANGIOGRAPHY HEAD: CPT

## 2019-09-06 PROCEDURE — 85610 PROTHROMBIN TIME: CPT

## 2019-09-06 RX ORDER — CIPROFLOXACIN 250 MG/1
250 TABLET, FILM COATED ORAL 2 TIMES DAILY
Status: COMPLETED | OUTPATIENT
Start: 2019-09-07 | End: 2019-09-10

## 2019-09-06 RX ORDER — PANTOPRAZOLE SODIUM 40 MG/1
40 TABLET, DELAYED RELEASE ORAL
Status: DISCONTINUED | OUTPATIENT
Start: 2019-09-07 | End: 2019-09-13 | Stop reason: HOSPADM

## 2019-09-06 RX ORDER — SODIUM CHLORIDE 0.9 % (FLUSH) 0.9 %
10 SYRINGE (ML) INJECTION EVERY 12 HOURS SCHEDULED
Status: DISCONTINUED | OUTPATIENT
Start: 2019-09-06 | End: 2019-09-13 | Stop reason: HOSPADM

## 2019-09-06 RX ORDER — ACETAMINOPHEN 325 MG/1
650 TABLET ORAL EVERY 4 HOURS PRN
Status: DISCONTINUED | OUTPATIENT
Start: 2019-09-06 | End: 2019-09-13 | Stop reason: HOSPADM

## 2019-09-06 RX ORDER — ONDANSETRON 2 MG/ML
4 INJECTION INTRAMUSCULAR; INTRAVENOUS EVERY 6 HOURS PRN
Status: DISCONTINUED | OUTPATIENT
Start: 2019-09-06 | End: 2019-09-13 | Stop reason: HOSPADM

## 2019-09-06 RX ORDER — ASPIRIN 325 MG
325 TABLET ORAL ONCE
Status: COMPLETED | OUTPATIENT
Start: 2019-09-06 | End: 2019-09-06

## 2019-09-06 RX ORDER — SODIUM CHLORIDE 0.9 % (FLUSH) 0.9 %
10 SYRINGE (ML) INJECTION PRN
Status: DISCONTINUED | OUTPATIENT
Start: 2019-09-06 | End: 2019-09-13 | Stop reason: HOSPADM

## 2019-09-06 RX ORDER — VIT C/B6/B5/MAGNESIUM/HERB 173 50-5-6-5MG
CAPSULE ORAL
Status: ON HOLD | COMMUNITY
End: 2019-09-24 | Stop reason: HOSPADM

## 2019-09-06 RX ORDER — ATORVASTATIN CALCIUM 40 MG/1
40 TABLET, FILM COATED ORAL NIGHTLY
Status: DISCONTINUED | OUTPATIENT
Start: 2019-09-06 | End: 2019-09-13 | Stop reason: HOSPADM

## 2019-09-06 RX ADMIN — ASPIRIN 325 MG ORAL TABLET 325 MG: 325 PILL ORAL at 19:16

## 2019-09-06 RX ADMIN — Medication 10 ML: at 23:31

## 2019-09-06 RX ADMIN — IOPAMIDOL 80 ML: 755 INJECTION, SOLUTION INTRAVENOUS at 18:54

## 2019-09-06 RX ADMIN — ATORVASTATIN CALCIUM 40 MG: 40 TABLET, FILM COATED ORAL at 23:31

## 2019-09-06 ASSESSMENT — PAIN DESCRIPTION - DESCRIPTORS: DESCRIPTORS: ACHING

## 2019-09-06 ASSESSMENT — ENCOUNTER SYMPTOMS
ABDOMINAL PAIN: 0
SINUS PRESSURE: 0
DIARRHEA: 0
SHORTNESS OF BREATH: 0
WHEEZING: 0
SHORTNESS OF BREATH: 0
COLOR CHANGE: 0
BACK PAIN: 0
CONSTIPATION: 0
CONSTIPATION: 0
EYES NEGATIVE: 1
DIARRHEA: 0
SINUS PAIN: 0
ABDOMINAL PAIN: 0
NAUSEA: 0
COUGH: 0
COUGH: 0

## 2019-09-06 ASSESSMENT — PAIN DESCRIPTION - PAIN TYPE: TYPE: ACUTE PAIN

## 2019-09-06 ASSESSMENT — PAIN DESCRIPTION - LOCATION: LOCATION: HEAD

## 2019-09-06 ASSESSMENT — PAIN SCALES - GENERAL: PAINLEVEL_OUTOF10: 3

## 2019-09-06 ASSESSMENT — PAIN DESCRIPTION - FREQUENCY: FREQUENCY: CONTINUOUS

## 2019-09-06 NOTE — ED PROVIDER NOTES
rhythm, normal heart sounds and intact distal pulses. Pulmonary/Chest: Breath sounds normal. She has no rales. Abdominal: Soft. Bowel sounds are normal. She exhibits no distension. There is no tenderness. There is no guarding. Musculoskeletal: She exhibits no edema. Lymphadenopathy:     She has no cervical adenopathy. Neurological: She is alert and oriented to person, place, and time. When patient tries to raise her left arm she cannot hold it up and it drifts down to the stretcher. When she tries to hold up her left leg it comes back down to the stretcher. No facial droop. Speech clear. Patient is left-handed. No sensory deficit. No visual deficit although she does appear to have a slight left internal strabismus. Skin: Skin is warm and dry. She is not diaphoretic. Psychiatric: She has a normal mood and affect. Nursing note and vitals reviewed. Diagnostic Results     EKG   EKG revealed normal sinus rhythm with leftward axis no ST or T wave abnormalities and normal conduction times. RADIOLOGY:  XR CHEST PORTABLE   Final Result      No acute pulmonary disease. CTA HEAD W CONTRAST   Final Result      1. Occlusion versus severe stenosis of the right posterior cerebral artery P2 segment. 2. Areas of severe narrowing of the left anterior cerebral artery A2 segment. 3. No significant stenosis in the extracranial vertebral or carotid arteries. 4. Multilevel neural foraminal narrowing in the cervical spine as described above. Findings were discussed with Dr. Tila Olsen by Dr. Norma Zuniga on 9/6/2019 at 7:13 PM.      CTA NECK W CONTRAST   Final Result      1. Occlusion versus severe stenosis of the right posterior cerebral artery P2 segment. 2. Areas of severe narrowing of the left anterior cerebral artery A2 segment. 3. No significant stenosis in the extracranial vertebral or carotid arteries.       4. Multilevel neural foraminal narrowing in the cervical spine as 13.0 sec    INR 0.96 0.86 - 1.14   Brain Natriuretic Peptide   Result Value Ref Range    Pro- 0 - 449 pg/mL   POCT Glucose   Result Value Ref Range    POC Glucose 112 (H) 70 - 99 mg/dl    Performed on ACCU-CHEK    POCT Venous   Result Value Ref Range    pH, Fabiana 7.353 7.350 - 7.450    pCO2, Fabiana 45.4 40.0 - 50.0 mm Hg    pO2, Fabiana 40 Not Established mm Hg    HCO3, Venous 25.3 23.0 - 29.0 mmol/L    Base Excess, Fabiana 0 -3 - 3    O2 Sat, Fabiana 73 Not Established %    TC02 (Calc), Fabiana 27 Not Established mmol/L    Lactate 1.28 0.40 - 2.00 mmol/L    Sample Type FABIANA     Performed on SEE BELOW    EKG 12 Lead   Result Value Ref Range    Ventricular Rate 74 BPM    Atrial Rate 74 BPM    P-R Interval 170 ms    QRS Duration 82 ms    Q-T Interval 424 ms    QTc Calculation (Bazett) 470 ms    R Axis -24 degrees    T Axis 90 degrees    Diagnosis       EKG performed in ER and to be interpreted by ER physician. Confirmed by MD, ER ([id number]),  Chano Quan ([id number]) on 9/6/2019 7:23:39 PM           RECENT VITALS:  BP: (!) 184/97,Temp: 98 °F (36.7 °C), Pulse: 73, Resp: 25, SpO2: 98 %     Procedures         ED Course     Nursing Notes, Past Medical Hx, Past Surgical Hx, Social Hx,Allergies, and Family Hx were reviewed. The patient was given the following medications:  Orders Placed This Encounter   Medications    iopamidol (ISOVUE-370) 76 % injection 80 mL    aspirin tablet 325 mg       CONSULTS:  IP CONSULT TO PHARMACY  IP CONSULT TO PRIMARY CARE PROVIDER  IP CONSULT TO NEUROSURGERY  IP CONSULT TO [address] [place] [address] DECISIONMAKING / ASSESSMENT / Sin Catalino is a 66 y.o. female emergency room after left-sided weakness. Patient is left-handed and left arm left leg and very weak. She has no facial asymmetry. She did have a fall after this weakness and does have a small bruise of her left eyelid area.   Her CT head was unremarkable for a bleed and there was a occlusion of the posterior cerebral artery

## 2019-09-06 NOTE — ED TRIAGE NOTES
Pt comes to ed with left side weakness and decreased sensation. Pt has drift in left extremities. Pt als report decreased sensation.  Pt states symptoms started around 2 pm today

## 2019-09-06 NOTE — H&P
Rodney Gaffney MD   Stopped at 10/26/17 1231    sodium chloride flush 0.9 % injection 10 mL  10 mL Intravenous 2 times per day Beatriz Alcantara MD        sodium chloride flush 0.9 % injection 10 mL  10 mL Intravenous PRN Beatriz Alcantara MD        glycopyrrolate (ROBINUL) injection 0.2 mg  0.2 mg Intravenous Once Beatriz Alcantara MD        fentaNYL (SUBLIMAZE) injection 25 mcg  25 mcg Intravenous Q5 Min PRN Beatriz Alcantara MD        HYDROmorphone (DILAUDID) injection 0.5 mg  0.5 mg Intravenous Q5 Min PRN Beatriz Alcantara MD        labetalol (NORMODYNE;TRANDATE) injection 5 mg  5 mg Intravenous Q5 Min PRN Beatriz Alcantara MD        hydrALAZINE (APRESOLINE) injection 5 mg  5 mg Intravenous Q5 Min PRN Beatriz Alcantara MD        meperidine (DEMEROL) injection 12.5 mg  12.5 mg Intravenous Q5 Min PRN Beatriz Alcantara MD         Current Outpatient Medications on File Prior to Encounter   Medication Sig Dispense Refill    ciprofloxacin (CIPRO) 250 MG tablet Take 1 tablet by mouth 2 times daily for 5 days 10 tablet 0    alendronate (FOSAMAX) 70 MG tablet TAKE 1 TABLET BY MOUTH ONCE A WEEK ( EVERY 7 DAYS ) 4 tablet 5    sertraline (ZOLOFT) 50 MG tablet TAKE 1 TABLET BY MOUTH ONCE DAILY 90 tablet 1    omeprazole (PRILOSEC) 10 MG delayed release capsule Take 1 capsule by mouth Daily 90 capsule 3    aspirin 81 MG tablet Take 1 tablet by mouth daily (Patient not taking: Reported on 9/5/2019) 30 tablet 3    Psyllium (METAMUCIL PO) Take by mouth      calcium carbonate 600 MG TABS tablet Take 1 tablet by mouth daily      Cholecalciferol (VITAMIN D-3) 1000 UNITS CAPS Take by mouth      Ascorbic Acid (VITAMIN C) 250 MG tablet Take 250 mg by mouth daily.  Multiple Vitamin (MULTIVITAMIN PO) Take  by mouth. ·     Allergies:  Sulfa antibiotics; Sulfamethoxazole-trimethoprim; and Vicodin [hydrocodone-acetaminophen]    Social History:   · TOBACCO:   reports that she has never smoked.  She has never used smokeless

## 2019-09-07 ENCOUNTER — APPOINTMENT (OUTPATIENT)
Dept: CT IMAGING | Age: 74
DRG: 065 | End: 2019-09-07
Payer: MEDICARE

## 2019-09-07 PROBLEM — I67.9 CEREBRAL VASCULAR DISEASE: Status: ACTIVE | Noted: 2019-09-07

## 2019-09-07 PROBLEM — J18.9 PNEUMONIA DUE TO INFECTIOUS ORGANISM: Status: RESOLVED | Noted: 2019-06-21 | Resolved: 2019-09-07

## 2019-09-07 LAB
ANION GAP SERPL CALCULATED.3IONS-SCNC: 13 MMOL/L (ref 3–16)
BASOPHILS ABSOLUTE: 0.1 K/UL (ref 0–0.2)
BASOPHILS RELATIVE PERCENT: 1 %
BUN BLDV-MCNC: 18 MG/DL (ref 7–20)
CALCIUM SERPL-MCNC: 9 MG/DL (ref 8.3–10.6)
CHLORIDE BLD-SCNC: 103 MMOL/L (ref 99–110)
CO2: 23 MMOL/L (ref 21–32)
CREAT SERPL-MCNC: 0.7 MG/DL (ref 0.6–1.2)
EOSINOPHILS ABSOLUTE: 0 K/UL (ref 0–0.6)
EOSINOPHILS RELATIVE PERCENT: 0.1 %
GFR AFRICAN AMERICAN: >60
GFR NON-AFRICAN AMERICAN: >60
GLUCOSE BLD-MCNC: 147 MG/DL (ref 70–99)
GLUCOSE BLD-MCNC: 155 MG/DL (ref 70–99)
HCT VFR BLD CALC: 41.7 % (ref 36–48)
HEMOGLOBIN: 14 G/DL (ref 12–16)
LYMPHOCYTES ABSOLUTE: 1.4 K/UL (ref 1–5.1)
LYMPHOCYTES RELATIVE PERCENT: 11.4 %
MCH RBC QN AUTO: 30.8 PG (ref 26–34)
MCHC RBC AUTO-ENTMCNC: 33.5 G/DL (ref 31–36)
MCV RBC AUTO: 91.9 FL (ref 80–100)
MONOCYTES ABSOLUTE: 0.6 K/UL (ref 0–1.3)
MONOCYTES RELATIVE PERCENT: 5.2 %
NEUTROPHILS ABSOLUTE: 9.9 K/UL (ref 1.7–7.7)
NEUTROPHILS RELATIVE PERCENT: 82.3 %
ORGANISM: ABNORMAL
PDW BLD-RTO: 13 % (ref 12.4–15.4)
PERFORMED ON: ABNORMAL
PLATELET # BLD: 285 K/UL (ref 135–450)
PMV BLD AUTO: 8 FL (ref 5–10.5)
POTASSIUM REFLEX MAGNESIUM: 4.1 MMOL/L (ref 3.5–5.1)
RBC # BLD: 4.53 M/UL (ref 4–5.2)
SODIUM BLD-SCNC: 139 MMOL/L (ref 136–145)
URINE CULTURE, ROUTINE: ABNORMAL
WBC # BLD: 12 K/UL (ref 4–11)

## 2019-09-07 PROCEDURE — 2580000003 HC RX 258

## 2019-09-07 PROCEDURE — 6360000002 HC RX W HCPCS

## 2019-09-07 PROCEDURE — 36415 COLL VENOUS BLD VENIPUNCTURE: CPT

## 2019-09-07 PROCEDURE — 1200000000 HC SEMI PRIVATE

## 2019-09-07 PROCEDURE — 70450 CT HEAD/BRAIN W/O DYE: CPT

## 2019-09-07 PROCEDURE — 85025 COMPLETE CBC W/AUTO DIFF WBC: CPT

## 2019-09-07 PROCEDURE — 80048 BASIC METABOLIC PNL TOTAL CA: CPT

## 2019-09-07 PROCEDURE — 6370000000 HC RX 637 (ALT 250 FOR IP)

## 2019-09-07 RX ADMIN — Medication 10 ML: at 09:34

## 2019-09-07 RX ADMIN — ENOXAPARIN SODIUM 40 MG: 40 INJECTION SUBCUTANEOUS at 09:33

## 2019-09-07 RX ADMIN — ASPIRIN 325 MG: 325 TABLET, COATED ORAL at 09:33

## 2019-09-07 RX ADMIN — PANTOPRAZOLE SODIUM 40 MG: 40 TABLET, DELAYED RELEASE ORAL at 05:01

## 2019-09-07 RX ADMIN — Medication 10 ML: at 22:10

## 2019-09-07 RX ADMIN — ATORVASTATIN CALCIUM 40 MG: 40 TABLET, FILM COATED ORAL at 20:21

## 2019-09-07 RX ADMIN — ACETAMINOPHEN 650 MG: 325 TABLET ORAL at 05:02

## 2019-09-07 RX ADMIN — ONDANSETRON 4 MG: 2 INJECTION INTRAMUSCULAR; INTRAVENOUS at 03:06

## 2019-09-07 RX ADMIN — Medication 1 PACKET: at 10:34

## 2019-09-07 RX ADMIN — SERTRALINE HYDROCHLORIDE 50 MG: 50 TABLET ORAL at 09:33

## 2019-09-07 RX ADMIN — CIPROFLOXACIN HYDROCHLORIDE 250 MG: 250 TABLET, FILM COATED ORAL at 09:33

## 2019-09-07 RX ADMIN — CIPROFLOXACIN HYDROCHLORIDE 250 MG: 250 TABLET, FILM COATED ORAL at 20:21

## 2019-09-07 ASSESSMENT — PAIN DESCRIPTION - FREQUENCY: FREQUENCY: CONTINUOUS

## 2019-09-07 ASSESSMENT — PAIN SCALES - GENERAL
PAINLEVEL_OUTOF10: 0
PAINLEVEL_OUTOF10: 3

## 2019-09-07 ASSESSMENT — PAIN DESCRIPTION - DESCRIPTORS: DESCRIPTORS: HEADACHE

## 2019-09-07 ASSESSMENT — PAIN DESCRIPTION - PROGRESSION
CLINICAL_PROGRESSION: GRADUALLY WORSENING

## 2019-09-07 ASSESSMENT — PAIN DESCRIPTION - PAIN TYPE: TYPE: ACUTE PAIN

## 2019-09-07 ASSESSMENT — PAIN DESCRIPTION - LOCATION: LOCATION: HEAD

## 2019-09-07 ASSESSMENT — PAIN DESCRIPTION - ONSET: ONSET: ON-GOING

## 2019-09-07 NOTE — PROGRESS NOTES
BEART, THGBART, [id number], HKR1GVM in the last 72 hours. INR:   Recent Labs     09/06/19  1847   INR 0.96     Lactate:   Recent Labs     09/06/19  1853   LACTATE 1.28     Cultures:  -----------------------------------------------------------------  RAD:   CT HEAD WO CONTRAST   Final Result   No change from prior study. No acute infarct noted. No    evidence for hemorrhage or hematoma noted      MRI CERVICAL SPINE WO CONTRAST   Final Result      1. Mild spinal canal stenosis and moderate to severe bilateral neural foraminal stenoses at C5-6 and C6-7. MRI BRAIN WO CONTRAST   Final Result      1. Acute lacunar infarct involving the lateral aspect of the right thalamus and posterior limb of the right internal capsule. 2. Chronic small left occipital lobe infarct. 3. Mild cerebral atrophy. 4. Mild chronic small vessel ischemic disease. XR CHEST PORTABLE   Final Result      No acute pulmonary disease. CTA HEAD W CONTRAST   Final Result      1. Occlusion versus severe stenosis of the right posterior cerebral artery P2 segment. 2. Areas of severe narrowing of the left anterior cerebral artery A2 segment. 3. No significant stenosis in the extracranial vertebral or carotid arteries. 4. Multilevel neural foraminal narrowing in the cervical spine as described above. Findings were discussed with Dr. Fritz Ovalle by Dr. Brooks Early on 9/6/2019 at 7:13 PM.      CTA NECK W CONTRAST   Final Result      1. Occlusion versus severe stenosis of the right posterior cerebral artery P2 segment. 2. Areas of severe narrowing of the left anterior cerebral artery A2 segment. 3. No significant stenosis in the extracranial vertebral or carotid arteries. 4. Multilevel neural foraminal narrowing in the cervical spine as described above. Findings were discussed with Dr. Fritz Ovalle by Dr. Brooks Early on 9/6/2019 at 7:13 PM.      CT HEAD WO CONTRAST   Final Result      1.  No acute intracranial abnormality by CT criteria. 2. Mild cerebral atrophy. 3. Mild chronic small vessel ischemic disease. Assessment/Plan:     Lacunar stroke, Right - Pure motor hemiparesis. MRI brain showed acute lacunar infarct of lateral R thalamus and posterior limb of R internal capsule.  CTA head also showed severe stenosis of R PCA P2 segment and severe narrowing of L STACIA A2 segment  - Permissive HTN for 24h  -  qd  - Atorvastatin 40 mg qd  - Neurochecks q4h  - f/u Echo  - Neurology consulted, appreciate recs  - Neurosurgery consulted, appreciate recs  - PT/OT     Cervical neural foraminal stenosis and spinal canal stenosis - C5-6, C6-7  - Neurosurgery consulted, appreciate recs     Acute simple Cystitis - Started treatment by PCP with Cipro  - Continue Cipro 250 mg po BID for 3 more days     GERD  - Protonix qd     HTN  - Allowing for permissive HTN     Breast cancer - Dx 10/2017 stage IA (Left breast central portion, T1b, N0, M0), ER+,HI+, HER-2/dora neg invasive ductal carcinoma, s/p XRT, chemo with anastrozole, and partial mastectomy     Depression   - continue sertraline 50 mg qd    Code Status:Full Code  FEN: DIET GENERAL;  PPX: SCDs  DISPO: Floor    Lianne Ramirez DO, PGY-1  09/07/19  11:23 AM    This patient has been staffed and discussed with Jasmeet Griffiths MD.

## 2019-09-07 NOTE — CONSULTS
Neurology consult Note      Patient: Reynaldo Diaz MRN: [id number]    YOB: 1945  Age: 66 y.o. Sex: female   Unit: [facility]onal[facility] Thras[facility] Room/Bed: 7204/4128-69 Location: [address] [place] [address]    Date of Consultation: 9/7/2019  Date of Admission: 9/6/2019  6:37 PM ( LOS: 1 day )    Consult Requested By: Paula Melendez/ Taco Lewis     Reason for Consult: CVA, possible TIA    Chief Complaint:   Left sided weakness    History of Present Illness:    History obtained from chart review and from patient and family ( and sister) at bedside. Reynaldo Diaz is a 66 y.o. left handed woman with PHx significant for GERD, HTN, breast cancer, and depression. She presented yesterday evening with acute onset left arm and leg weakness that started around 2 PM.    She reported having had had a \"funny sensation\" which she says was a paresthesia of numbness and tingling in the left side of her body. She discussed this with her , and went to bed to try to sleep it off,  however when she woke up she noticed this feeling did not go away. She was intermittently dizzy and walked down the karimi to the bathroom and suddenly fell down striking her left side of her head, but did not lose consciousness. She noticed her left arm and leg had lost strength and not returned.  helped her up and EMS was called. In the ER she had a CT head which did not reveal any acute intracranial abnormality, and showed mild cerebral atrophy, and mild chronic microvascular ischemic disease. She underwent a CT angiogram of the head and neck which showed occlusion/stenosis of the right posterior cerebral artery P2 segment, areas of severe narrowing of the left anterior cerebral artery A2 segment, and incidentally showed multilevel neuroforaminal narrowing. MRI brain without contrast showed an acute lacunar infarct in the lateral aspect of the right thalamus in the posterior limb of the right internal capsule.   It cerebral atrophy. 3. Mild chronic small vessel ischemic disease. I have personally reviewed the images of the MRI brain wo contrast        Laboratory Review: All results below personally reviewed.  Pertinent positives & negatives are addressed in Assessment & Plan section of note  Recent Results (from the past 36 hour(s))   POCT Glucose    Collection Time: 09/06/19  6:41 PM   Result Value Ref Range    POC Glucose 112 (H) 70 - 99 mg/dl    Performed on ACCU-CHEK    CBC Auto Differential    Collection Time: 09/06/19  6:47 PM   Result Value Ref Range    WBC 9.2 4.0 - 11.0 K/uL    RBC 4.84 4.00 - 5.20 M/uL    Hemoglobin 15.0 12.0 - 16.0 g/dL    Hematocrit 44.6 36.0 - 48.0 %    MCV 92.1 80.0 - 100.0 fL    MCH 31.0 26.0 - 34.0 pg    MCHC 33.7 31.0 - 36.0 g/dL    RDW 12.9 12.4 - 15.4 %    Platelets 725 356 - 586 K/uL    MPV 7.8 5.0 - 10.5 fL    Neutrophils % 69.1 %    Lymphocytes % 19.4 %    Monocytes % 9.0 %    Eosinophils % 1.3 %    Basophils % 1.2 %    Neutrophils Absolute 6.3 1.7 - 7.7 K/uL    Lymphocytes Absolute 1.8 1.0 - 5.1 K/uL    Monocytes Absolute 0.8 0.0 - 1.3 K/uL    Eosinophils Absolute 0.1 0.0 - 0.6 K/uL    Basophils Absolute 0.1 0.0 - 0.2 K/uL   Comprehensive Metabolic Panel w/ Reflex to MG    Collection Time: 09/06/19  6:47 PM   Result Value Ref Range    Sodium 142 136 - 145 mmol/L    Potassium reflex Magnesium 4.3 3.5 - 5.1 mmol/L    Chloride 104 99 - 110 mmol/L    CO2 27 21 - 32 mmol/L    Anion Gap 11 3 - 16    Glucose 124 (H) 70 - 99 mg/dL    BUN 21 (H) 7 - 20 mg/dL    CREATININE 0.8 0.6 - 1.2 mg/dL    GFR Non-African American >60 >60    GFR African American >60 >60    Calcium 9.8 8.3 - 10.6 mg/dL    Total Protein 7.8 6.4 - 8.2 g/dL    Alb 4.6 3.4 - 5.0 g/dL    Albumin/Globulin Ratio 1.4 1.1 - 2.2    Total Bilirubin 0.4 0.0 - 1.0 mg/dL    Alkaline Phosphatase 63 40 - 129 U/L    ALT 17 10 - 40 U/L    AST 18 15 - 37 U/L    Globulin 3.2 g/dL   Troponin    Collection Time: 09/06/19  6:47 PM   Result Value Ref Range    Troponin <0.01 <0.01 ng/mL   Protime-INR    Collection Time: 09/06/19  6:47 PM   Result Value Ref Range    Protime 11.0 9.8 - 13.0 sec    INR 0.96 0.86 - 1.14   Brain Natriuretic Peptide    Collection Time: 09/06/19  6:47 PM   Result Value Ref Range    Pro- 0 - 449 pg/mL   POCT Venous    Collection Time: 09/06/19  6:53 PM   Result Value Ref Range    pH, Fabiana 7.353 7.350 - 7.450    pCO2, Fabiana 45.4 40.0 - 50.0 mm Hg    pO2, Fabiana 40 Not Established mm Hg    HCO3, Venous 25.3 23.0 - 29.0 mmol/L    Base Excess, Fabiana 0 -3 - 3    O2 Sat, Fabiana 73 Not Established %    TC02 (Calc), Fabiana 27 Not Established mmol/L    Lactate 1.28 0.40 - 2.00 mmol/L    Sample Type FABIANA     Performed on SEE BELOW    EKG 12 Lead    Collection Time: 09/06/19  7:13 PM   Result Value Ref Range    Ventricular Rate 74 BPM    Atrial Rate 74 BPM    P-R Interval 170 ms    QRS Duration 82 ms    Q-T Interval 424 ms    QTc Calculation (Bazett) 470 ms    R Axis -24 degrees    T Axis 90 degrees    Diagnosis       EKG performed in ER and to be interpreted by ER physician. Confirmed by MD, ER ([id number]),  [id number] on 9/6/2019 7:23:39 PM   POCT Glucose    Collection Time: 09/07/19  3:46 AM   Result Value Ref Range    POC Glucose 155 (H) 70 - 99 mg/dl    Performed on ACCU-CHEK    Basic Metabolic Panel w/ Reflex to MG    Collection Time: 09/07/19  7:26 AM   Result Value Ref Range    Sodium 139 136 - 145 mmol/L    Potassium reflex Magnesium 4.1 3.5 - 5.1 mmol/L    Chloride 103 99 - 110 mmol/L    CO2 23 21 - 32 mmol/L    Anion Gap 13 3 - 16    Glucose 147 (H) 70 - 99 mg/dL    BUN 18 7 - 20 mg/dL    CREATININE 0.7 0.6 - 1.2 mg/dL    GFR Non-African American >60 >60    GFR African American >60 >60    Calcium 9.0 8.3 - 10.6 mg/dL   CBC auto differential    Collection Time: 09/07/19  7:26 AM   Result Value Ref Range    WBC 12.0 (H) 4.0 - 11.0 K/uL    RBC 4.53 4.00 - 5.20 M/uL    Hemoglobin 14.0 12.0 - 16.0 g/dL    Hematocrit 41.7 36.0 - 48.0 %    MCV 91.9 80.0 - 100.0 fL    MCH 30.8 26.0 - 34.0 pg    MCHC 33.5 31.0 - 36.0 g/dL    RDW 13.0 12.4 - 15.4 %    Platelets 621 565 - 291 K/uL    MPV 8.0 5.0 - 10.5 fL    Neutrophils % 82.3 %    Lymphocytes % 11.4 %    Monocytes % 5.2 %    Eosinophils % 0.1 %    Basophils % 1.0 %    Neutrophils Absolute 9.9 (H) 1.7 - 7.7 K/uL    Lymphocytes Absolute 1.4 1.0 - 5.1 K/uL    Monocytes Absolute 0.6 0.0 - 1.3 K/uL    Eosinophils Absolute 0.0 0.0 - 0.6 K/uL    Basophils Absolute 0.1 0.0 - 0.2 K/uL       Scheduled Meds:   ciprofloxacin  250 mg Oral BID    pantoprazole  40 mg Oral QAM AC    psyllium  1 Package Oral Daily    sertraline  50 mg Oral Daily    sodium chloride flush  10 mL Intravenous 2 times per day    enoxaparin  40 mg Subcutaneous Daily    aspirin  325 mg Oral Daily    atorvastatin  40 mg Oral Nightly         ASSESSMENT & RECOMMENDATIONS:     Acute right thalamic infarct  HTN  Left hemiparesis and left hemisensory loss  Intracranial atherosclerosis  Cervical spine radiculopathy, chronic  Head trauma  Left periorbital hematoma    74y/LHF with left hemiparesis and left hemibody sensory changes. MRI confirmed right thalamic acute infarct. Non smoker. PMH significant for HTN. Ocular movement abnormality and field cut seen earlier in this hospitalization may have been related to periorbital edema. These have resolved. They would not be explained by infarct seen on MRI. Mild spinal canal stenosis and moderate to severe bilateral neural foraminal stenoses at C5-6 and C6-7, appear chronic and would not explain all her current symptoms. She has no pain or abnormal reflexes on examination. No role for inpatient intervention for these.     Recs:    - Vessel imaging with significant intracranial atherosclerosis, so she would be a candidate for dual antiplatelet therapy x 90 days (ASA 81 mg daily + Plavix 75 mg daily), followed by antiplatelet monotherapy (either ASA 81 mg OR Plavix 75

## 2019-09-07 NOTE — CONSULTS
but medical history and MRI findings from today show a prior occipital stroke.      Currently No complaints of fever, chills, headache, lightheadedness, dizziness, dyspnea, chest pain, abdominal pain, N/V/D/C.      No complaints of focal neurological weakness in the past.  No h/o diplopia, dysarthria, dysphagia.     She feels like she is gradually recovering, but is not back to her baseline.      Past Medical History:  The patient has  has a past medical history of Vernon's esophagus, Cancer ([facility] [address] Cerebrovascular accident (CVA) due to embolism of posterior cerebral artery with infarctions of both occipital lobes ([address] Colon polyp, Gallstones, GERD (gastroesophageal reflux disease), Hypertension, Kidney stone, Osteoporoses, Pregnancies, Shingles, Tubular adenoma nos, and UTI (urinary tract infection).     Past Medical History:   Diagnosis Date    Vernon's esophagus     Cancer [facility])     breast    Cerebrovascular accident (CVA) due to embolism of posterior cerebral artery with infarctions of both occipital lobes ([facility] [address] 1/10/2018    Colon polyp 1/15/2016    Gallstones     GERD (gastroesophageal reflux disease)     Hypertension     Kidney stone     Osteoporoses     Pregnancies     2- vaginal deliveries- 2    Shingles     Tubular adenoma nos     UTI (urinary tract infection)      Past Surgical History:   Procedure Laterality Date    APPENDECTOMY      BLADDER REPAIR  9/09    cah    BREAST SURGERY Left 10/26/2017    Left breastpartial mastectomy, needle localization, lymph node dissection    CHOLECYSTECTOMY  1988    COLONOSCOPY  2008,3/9/2017    polyp    HYSTERECTOMY      UPPER GASTROINTESTINAL ENDOSCOPY  2008     Sulfa antibiotics; Sulfamethoxazole-trimethoprim; and Vicodin [hydrocodone-acetaminophen]    Current Facility-Administered Medications:     ciprofloxacin (CIPRO) tablet 250 mg, 250 mg, Oral, BID, Louann Barbosa MD, 250 mg at 09/07/19 0971    pantoprazole (PROTONIX) tablet 40 mg, 40 mg, Oral,

## 2019-09-07 NOTE — PROGRESS NOTES
Assisted pt up to the bathroom with a walker and gait belt, pt voided urine, thought she would have a BM but was unable to do so at this time, pt then assisted back to bed, call light in reach, bed alarm on and family at bedside. Status remains unchanged.   Dulce Leon

## 2019-09-08 PROBLEM — I63.81 LACUNAR INFARCT, ACUTE (HCC): Status: ACTIVE | Noted: 2019-09-06

## 2019-09-08 PROBLEM — G46.7: Status: ACTIVE | Noted: 2019-09-08

## 2019-09-08 PROBLEM — I67.9: Status: ACTIVE | Noted: 2019-09-08

## 2019-09-08 LAB
ANION GAP SERPL CALCULATED.3IONS-SCNC: 13 MMOL/L (ref 3–16)
BASOPHILS ABSOLUTE: 0.1 K/UL (ref 0–0.2)
BASOPHILS RELATIVE PERCENT: 1 %
BUN BLDV-MCNC: 17 MG/DL (ref 7–20)
CALCIUM SERPL-MCNC: 8.9 MG/DL (ref 8.3–10.6)
CHLORIDE BLD-SCNC: 106 MMOL/L (ref 99–110)
CO2: 24 MMOL/L (ref 21–32)
CREAT SERPL-MCNC: 0.7 MG/DL (ref 0.6–1.2)
EOSINOPHILS ABSOLUTE: 0.2 K/UL (ref 0–0.6)
EOSINOPHILS RELATIVE PERCENT: 1.8 %
GFR AFRICAN AMERICAN: >60
GFR NON-AFRICAN AMERICAN: >60
GLUCOSE BLD-MCNC: 113 MG/DL (ref 70–99)
HCT VFR BLD CALC: 42.9 % (ref 36–48)
HEMOGLOBIN: 14.8 G/DL (ref 12–16)
LYMPHOCYTES ABSOLUTE: 2.5 K/UL (ref 1–5.1)
LYMPHOCYTES RELATIVE PERCENT: 24.3 %
MCH RBC QN AUTO: 31.5 PG (ref 26–34)
MCHC RBC AUTO-ENTMCNC: 34.5 G/DL (ref 31–36)
MCV RBC AUTO: 91.4 FL (ref 80–100)
MONOCYTES ABSOLUTE: 1 K/UL (ref 0–1.3)
MONOCYTES RELATIVE PERCENT: 9.6 %
NEUTROPHILS ABSOLUTE: 6.6 K/UL (ref 1.7–7.7)
NEUTROPHILS RELATIVE PERCENT: 63.3 %
PDW BLD-RTO: 12.8 % (ref 12.4–15.4)
PLATELET # BLD: 273 K/UL (ref 135–450)
PMV BLD AUTO: 8 FL (ref 5–10.5)
POTASSIUM REFLEX MAGNESIUM: 3.9 MMOL/L (ref 3.5–5.1)
RBC # BLD: 4.7 M/UL (ref 4–5.2)
SODIUM BLD-SCNC: 143 MMOL/L (ref 136–145)
WBC # BLD: 10.4 K/UL (ref 4–11)

## 2019-09-08 PROCEDURE — 1200000000 HC SEMI PRIVATE

## 2019-09-08 PROCEDURE — 6370000000 HC RX 637 (ALT 250 FOR IP)

## 2019-09-08 PROCEDURE — 97530 THERAPEUTIC ACTIVITIES: CPT

## 2019-09-08 PROCEDURE — 97162 PT EVAL MOD COMPLEX 30 MIN: CPT

## 2019-09-08 PROCEDURE — 6360000002 HC RX W HCPCS

## 2019-09-08 PROCEDURE — 36415 COLL VENOUS BLD VENIPUNCTURE: CPT

## 2019-09-08 PROCEDURE — 97535 SELF CARE MNGMENT TRAINING: CPT

## 2019-09-08 PROCEDURE — 97166 OT EVAL MOD COMPLEX 45 MIN: CPT

## 2019-09-08 PROCEDURE — 2580000003 HC RX 258

## 2019-09-08 PROCEDURE — 6370000000 HC RX 637 (ALT 250 FOR IP): Performed by: STUDENT IN AN ORGANIZED HEALTH CARE EDUCATION/TRAINING PROGRAM

## 2019-09-08 PROCEDURE — 85025 COMPLETE CBC W/AUTO DIFF WBC: CPT

## 2019-09-08 PROCEDURE — 80048 BASIC METABOLIC PNL TOTAL CA: CPT

## 2019-09-08 PROCEDURE — 99232 SBSQ HOSP IP/OBS MODERATE 35: CPT | Performed by: FAMILY MEDICINE

## 2019-09-08 PROCEDURE — 97116 GAIT TRAINING THERAPY: CPT

## 2019-09-08 RX ORDER — CLOPIDOGREL BISULFATE 75 MG/1
75 TABLET ORAL DAILY
Status: DISCONTINUED | OUTPATIENT
Start: 2019-09-08 | End: 2019-09-13 | Stop reason: HOSPADM

## 2019-09-08 RX ORDER — ASPIRIN 81 MG/1
81 TABLET, CHEWABLE ORAL DAILY
Status: DISCONTINUED | OUTPATIENT
Start: 2019-09-08 | End: 2019-09-13 | Stop reason: HOSPADM

## 2019-09-08 RX ADMIN — CIPROFLOXACIN HYDROCHLORIDE 250 MG: 250 TABLET, FILM COATED ORAL at 19:49

## 2019-09-08 RX ADMIN — Medication 1 PACKET: at 08:22

## 2019-09-08 RX ADMIN — ATORVASTATIN CALCIUM 40 MG: 40 TABLET, FILM COATED ORAL at 19:49

## 2019-09-08 RX ADMIN — SERTRALINE HYDROCHLORIDE 50 MG: 50 TABLET ORAL at 08:22

## 2019-09-08 RX ADMIN — PANTOPRAZOLE SODIUM 40 MG: 40 TABLET, DELAYED RELEASE ORAL at 06:22

## 2019-09-08 RX ADMIN — Medication 10 ML: at 21:00

## 2019-09-08 RX ADMIN — Medication 10 ML: at 11:01

## 2019-09-08 RX ADMIN — ENOXAPARIN SODIUM 40 MG: 40 INJECTION SUBCUTANEOUS at 08:22

## 2019-09-08 RX ADMIN — CLOPIDOGREL BISULFATE 75 MG: 75 TABLET ORAL at 08:22

## 2019-09-08 RX ADMIN — ASPIRIN 81 MG 81 MG: 81 TABLET ORAL at 08:22

## 2019-09-08 RX ADMIN — ACETAMINOPHEN 650 MG: 325 TABLET ORAL at 22:34

## 2019-09-08 RX ADMIN — CIPROFLOXACIN HYDROCHLORIDE 250 MG: 250 TABLET, FILM COATED ORAL at 08:22

## 2019-09-08 ASSESSMENT — PAIN DESCRIPTION - DESCRIPTORS: DESCRIPTORS: HEADACHE

## 2019-09-08 ASSESSMENT — PAIN SCALES - GENERAL
PAINLEVEL_OUTOF10: 0
PAINLEVEL_OUTOF10: 0
PAINLEVEL_OUTOF10: 3
PAINLEVEL_OUTOF10: 0

## 2019-09-08 ASSESSMENT — PAIN DESCRIPTION - PAIN TYPE: TYPE: ACUTE PAIN

## 2019-09-08 ASSESSMENT — PAIN DESCRIPTION - PROGRESSION: CLINICAL_PROGRESSION: NOT CHANGED

## 2019-09-08 ASSESSMENT — PAIN DESCRIPTION - LOCATION: LOCATION: HEAD

## 2019-09-08 ASSESSMENT — PAIN DESCRIPTION - ONSET: ONSET: ON-GOING

## 2019-09-08 ASSESSMENT — PAIN DESCRIPTION - ORIENTATION: ORIENTATION: MID

## 2019-09-08 ASSESSMENT — PAIN DESCRIPTION - FREQUENCY: FREQUENCY: INTERMITTENT

## 2019-09-08 NOTE — PLAN OF CARE
Problem: Falls - Risk of:  Goal: Will remain free from falls  Description  Will remain free from falls  9/8/2019 0945 by Martir Rhodes, RN  Outcome: Ongoing   Patient is in bed, bed is in lowest position, alarm on, nonskid socks on, will continue to monitor. Problem: Mobility - Impaired:  Goal: Mobility will improve  Description  Mobility will improve  9/8/2019 0945 by Martir Rhodes, RN  Outcome: Ongoing   Patient is getting up x1 with walker and gait belt. Will continue to monitor. Problem: Risk for Impaired Skin Integrity  Goal: Tissue integrity - skin and mucous membranes  Description  Structural intactness and normal physiological function of skin and  mucous membranes. Outcome: Ongoing  Patient is at an increase risk of skin integrity, patient is encouraged to reposition every 2 hours. Patient is checked for moisture q2h.

## 2019-09-08 NOTE — PROGRESS NOTES
86 16 94 %   09/07/19 2325 133/70 99.1 °F (37.3 °C) Oral 85 16 96 %       Intake/Output Summary (Last 24 hours) at 9/8/2019 0718  Last data filed at 9/7/2019 2206  Gross per 24 hour   Intake 700 ml   Output --   Net 700 ml       Review of Systems   All other systems reviewed and are negative. Physical Exam   Constitutional: She is oriented to person, place, and time. She appears well-developed and well-nourished. No distress. HENT:   Head: Normocephalic and atraumatic. Mouth/Throat: Oropharynx is clear and moist.   Eyes: Pupils are equal, round, and reactive to light. Conjunctivae and EOM are normal.   Neck: Normal range of motion. Neck supple. Cardiovascular: Normal rate, regular rhythm, normal heart sounds and intact distal pulses. Pulmonary/Chest: Effort normal and breath sounds normal.   Abdominal: Soft. Bowel sounds are normal.   Musculoskeletal: Normal range of motion. Neurological: She is alert and oriented to person, place, and time. A sensory deficit is present. No cranial nerve deficit. Coordination normal.   Dysesthesia on LUE and LLE. 5/5 strength RUE and RLE and 4/5 strength LUE and LLE. Skin: Skin is warm and dry. Capillary refill takes less than 2 seconds. Psychiatric: She has a normal mood and affect. LABS:    CBC:   Recent Labs     09/06/19 1847 09/07/19 0726   WBC 9.2 12.0*   HGB 15.0 14.0   HCT 44.6 41.7    285   MCV 92.1 91.9     Renal:    Recent Labs     09/06/19 1847 09/07/19 0726    139   K 4.3 4.1    103   CO2 27 23   BUN 21* 18   CREATININE 0.8 0.7   GLUCOSE 124* 147*   CALCIUM 9.8 9.0   ANIONGAP 11 13     Hepatic:   Recent Labs     09/06/19 1847   AST 18   ALT 17   BILITOT 0.4   PROT 7.8   LABALBU 4.6   ALKPHOS 63     Troponin:   Recent Labs     09/06/19 1847   TROPONINI <0.01     BNP: No results for input(s): BNP in the last 72 hours. Lipids: No results for input(s): CHOL, HDL in the last 72 hours.     Invalid input(s): LDLCALCU, TRIGLYCERIDE  ABGs:  No results for input(s): PHART, GLB3FXM, PO2ART, CBU6XOD, BEART, THGBART, E6WFYBDI, PQV1PDA in the last 72 hours. INR:   Recent Labs     09/06/19  1847   INR 0.96     Lactate:   Recent Labs     09/06/19  1853   LACTATE 1.28     Cultures:  -----------------------------------------------------------------  RAD:   CT HEAD WO CONTRAST   Final Result   No change from prior study. No acute infarct noted. No    evidence for hemorrhage or hematoma noted      MRI CERVICAL SPINE WO CONTRAST   Final Result      1. Mild spinal canal stenosis and moderate to severe bilateral neural foraminal stenoses at C5-6 and C6-7. MRI BRAIN WO CONTRAST   Final Result      1. Acute lacunar infarct involving the lateral aspect of the right thalamus and posterior limb of the right internal capsule. 2. Chronic small left occipital lobe infarct. 3. Mild cerebral atrophy. 4. Mild chronic small vessel ischemic disease. XR CHEST PORTABLE   Final Result      No acute pulmonary disease. CTA HEAD W CONTRAST   Final Result      1. Occlusion versus severe stenosis of the right posterior cerebral artery P2 segment. 2. Areas of severe narrowing of the left anterior cerebral artery A2 segment. 3. No significant stenosis in the extracranial vertebral or carotid arteries. 4. Multilevel neural foraminal narrowing in the cervical spine as described above. Findings were discussed with Dr. Aide Partida by Dr. Singh Feldman on 9/6/2019 at 7:13 PM.      CTA NECK W CONTRAST   Final Result      1. Occlusion versus severe stenosis of the right posterior cerebral artery P2 segment. 2. Areas of severe narrowing of the left anterior cerebral artery A2 segment. 3. No significant stenosis in the extracranial vertebral or carotid arteries. 4. Multilevel neural foraminal narrowing in the cervical spine as described above.       Findings were discussed with Dr. Aide Partida by Dr. Singh Feldman on 9/6/2019 at 7:13 PM.

## 2019-09-08 NOTE — PROGRESS NOTES
(1-2 SHERRY through back door without rail, level entry through front)  Bathroom Shower/Tub: Walk-in shower  Bathroom Toilet: Handicap height(sink next to toilet)  Bathroom Equipment: Shower chair, Grab bars in shower(has toilet safety frame but not currently using)  Home Equipment: Cane, [employer] walker(3 wheeled walker)  ADL Assistance: Independent  Homemaking Assistance: Independent  Ambulation Assistance: Independent  Transfer Assistance: Independent  Active : Yes  Additional Comments:  has had 2 strokes and uses walkers \"reluctantly. \"  \"He has balance issues. \"  Sister reports  has frequent falls and pt was helping him up. Pt was assisting  with showers. Pt had fall PTA. Denies prior falls. Objective   Vision: Impaired(wears glasses, appears to have deficits L periphery)  Hearing: Within functional limits    Orientation  Overall Orientation Status: Within Normal Limits     Balance  Sitting Balance: Stand by assistance  Standing Balance: Minimal assistance      Functional Mobility  Functional - Mobility Device: Rolling Walker  Activity: Other; To/from bathroom  Assist Level: Minimal assistance  Functional Mobility Comments: trialed mobility in room/hallway w/ no AD- pt unsteady, slow and guarded needing min A for balance.   Trialed RW for mobility to bathroom- needed min A -cga for balance and safety w/ RW, had 2 small LOB corrected with min A      Toilet Transfers  Toilet - Technique: Ambulating  Equipment Used: Standard toilet(R grab bar)  Toilet Transfer: Minimal assistance(min A stand>sit with assist to square hips onto toilet, cga sit>STand)       ADL  Feeding: (per sister report- pt has been able to feed self but has difficulty sustaining  of utensils)  Grooming: Contact guard assistance(wash hands)  Toileting: Minimal assistance(pt completed hygiene, assist brief/pants mgmt- difficulty using L hand)  Additional Comments: Provided foam to build up  of eating utensils Re-education, Patient/Caregiver Education & Training, Cognitive/Perceptual Training           AM-PAC Score        AM-PAC Inpatient Daily Activity Raw Score: 17 (09/08/19 1546)  AM-PAC Inpatient ADL T-Scale Score : 37.26 (09/08/19 1546)  ADL Inpatient CMS 0-100% Score: 50.11 (09/08/19 1546)  ADL Inpatient CMS G-Code Modifier : CK (09/08/19 1546)    Goals  Short term goals  Time Frame for Short term goals: by discharge  Short term goal 1: sba for functional ADL transfers using LRAD  Short term goal 2: demo compensatory strategies to attend to items in L field of vision 75% of time  Short term goal 3: cga LB dressing  Short term goal 4: sba toileting       Therapy Time   Individual Concurrent Group Co-treatment   Time In 1342         Time Out 1430         Minutes 48         Timed Code Treatment Minutes:   33  Total Treatment Minutes:  48     If patient is d/c prior to next treatment session, this note will serve as the discharge summary    [employer] OTR/L, [address], OT

## 2019-09-09 LAB
ANION GAP SERPL CALCULATED.3IONS-SCNC: 13 MMOL/L (ref 3–16)
BASOPHILS ABSOLUTE: 0.1 K/UL (ref 0–0.2)
BASOPHILS RELATIVE PERCENT: 1 %
BUN BLDV-MCNC: 16 MG/DL (ref 7–20)
CALCIUM SERPL-MCNC: 8.7 MG/DL (ref 8.3–10.6)
CHLORIDE BLD-SCNC: 107 MMOL/L (ref 99–110)
CO2: 23 MMOL/L (ref 21–32)
CREAT SERPL-MCNC: 0.7 MG/DL (ref 0.6–1.2)
EOSINOPHILS ABSOLUTE: 0.2 K/UL (ref 0–0.6)
EOSINOPHILS RELATIVE PERCENT: 2.4 %
GFR AFRICAN AMERICAN: >60
GFR NON-AFRICAN AMERICAN: >60
GLUCOSE BLD-MCNC: 116 MG/DL (ref 70–99)
HCT VFR BLD CALC: 41.8 % (ref 36–48)
HEMOGLOBIN: 14.1 G/DL (ref 12–16)
LV EF: 63 %
LVEF MODALITY: NORMAL
LYMPHOCYTES ABSOLUTE: 2 K/UL (ref 1–5.1)
LYMPHOCYTES RELATIVE PERCENT: 24.6 %
MCH RBC QN AUTO: 31.3 PG (ref 26–34)
MCHC RBC AUTO-ENTMCNC: 33.7 G/DL (ref 31–36)
MCV RBC AUTO: 92.9 FL (ref 80–100)
MONOCYTES ABSOLUTE: 0.9 K/UL (ref 0–1.3)
MONOCYTES RELATIVE PERCENT: 11.2 %
NEUTROPHILS ABSOLUTE: 5 K/UL (ref 1.7–7.7)
NEUTROPHILS RELATIVE PERCENT: 60.8 %
PDW BLD-RTO: 12.6 % (ref 12.4–15.4)
PLATELET # BLD: 250 K/UL (ref 135–450)
PMV BLD AUTO: 8.1 FL (ref 5–10.5)
POTASSIUM REFLEX MAGNESIUM: 4 MMOL/L (ref 3.5–5.1)
RBC # BLD: 4.5 M/UL (ref 4–5.2)
SODIUM BLD-SCNC: 143 MMOL/L (ref 136–145)
WBC # BLD: 8.3 K/UL (ref 4–11)

## 2019-09-09 PROCEDURE — 2580000003 HC RX 258

## 2019-09-09 PROCEDURE — 97535 SELF CARE MNGMENT TRAINING: CPT

## 2019-09-09 PROCEDURE — 99232 SBSQ HOSP IP/OBS MODERATE 35: CPT | Performed by: FAMILY MEDICINE

## 2019-09-09 PROCEDURE — 85025 COMPLETE CBC W/AUTO DIFF WBC: CPT

## 2019-09-09 PROCEDURE — 97116 GAIT TRAINING THERAPY: CPT

## 2019-09-09 PROCEDURE — C8929 TTE W OR WO FOL WCON,DOPPLER: HCPCS

## 2019-09-09 PROCEDURE — 97112 NEUROMUSCULAR REEDUCATION: CPT

## 2019-09-09 PROCEDURE — 97530 THERAPEUTIC ACTIVITIES: CPT

## 2019-09-09 PROCEDURE — 6370000000 HC RX 637 (ALT 250 FOR IP): Performed by: STUDENT IN AN ORGANIZED HEALTH CARE EDUCATION/TRAINING PROGRAM

## 2019-09-09 PROCEDURE — 6360000002 HC RX W HCPCS

## 2019-09-09 PROCEDURE — 36415 COLL VENOUS BLD VENIPUNCTURE: CPT

## 2019-09-09 PROCEDURE — 1200000000 HC SEMI PRIVATE

## 2019-09-09 PROCEDURE — 80048 BASIC METABOLIC PNL TOTAL CA: CPT

## 2019-09-09 PROCEDURE — 6370000000 HC RX 637 (ALT 250 FOR IP)

## 2019-09-09 RX ORDER — AMLODIPINE BESYLATE 5 MG/1
5 TABLET ORAL DAILY
Status: DISCONTINUED | OUTPATIENT
Start: 2019-09-09 | End: 2019-09-13 | Stop reason: HOSPADM

## 2019-09-09 RX ADMIN — ENOXAPARIN SODIUM 40 MG: 40 INJECTION SUBCUTANEOUS at 08:37

## 2019-09-09 RX ADMIN — ASPIRIN 81 MG 81 MG: 81 TABLET ORAL at 08:37

## 2019-09-09 RX ADMIN — Medication 1 PACKET: at 08:37

## 2019-09-09 RX ADMIN — CIPROFLOXACIN HYDROCHLORIDE 250 MG: 250 TABLET, FILM COATED ORAL at 08:37

## 2019-09-09 RX ADMIN — Medication 10 ML: at 21:03

## 2019-09-09 RX ADMIN — ATORVASTATIN CALCIUM 40 MG: 40 TABLET, FILM COATED ORAL at 21:03

## 2019-09-09 RX ADMIN — SERTRALINE HYDROCHLORIDE 50 MG: 50 TABLET ORAL at 08:37

## 2019-09-09 RX ADMIN — AMLODIPINE BESYLATE 5 MG: 5 TABLET ORAL at 12:37

## 2019-09-09 RX ADMIN — CLOPIDOGREL BISULFATE 75 MG: 75 TABLET ORAL at 08:37

## 2019-09-09 RX ADMIN — CIPROFLOXACIN HYDROCHLORIDE 250 MG: 250 TABLET, FILM COATED ORAL at 21:03

## 2019-09-09 RX ADMIN — PANTOPRAZOLE SODIUM 40 MG: 40 TABLET, DELAYED RELEASE ORAL at 05:45

## 2019-09-09 RX ADMIN — Medication 10 ML: at 08:37

## 2019-09-09 ASSESSMENT — PAIN SCALES - GENERAL
PAINLEVEL_OUTOF10: 0
PAINLEVEL_OUTOF10: 0

## 2019-09-09 NOTE — PROGRESS NOTES
Progress Note    Admit Date: 9/6/2019  Day: 4  Diet: DIET GENERAL;    CC: weakness    Interval history: No acute events overnight. Pt examined bedside this am and states that her strength and sensation has mildly improved since yesterday. Denies any other complaints. HPI: 67 yo F PMH GERD, HTN, breast cancer, depression, p/w acute onset left arm and leg weakness that started around 2 PM today. Patient had a \"funny sensation\" and went to bed to sleep it off, however when she woke up she noticed this feeling did not go away. She was intermittently dizzy and walked down the karimi to the bathroom and suddenly fell down striking her left side of her head, but did not lose consciousness. She noticed her left arm and leg had lost strength and not returned.  helped her up and EMS was called. Patient denies similar symptoms in the past but medical history and MRI findings from today show a prior occipital stroke. Currently No complaints of fever, chills, headache, lightheadedness, dizziness, dyspnea, chest pain, abdominal pain, N/V/D/C. MRI brain showed acute lacunar infarct of lateral R thalamus and posterior limb of R internal capsule. CTA head also showed severe stenosis of R PCA P2 segment and severe narrowing of L STACIA A2 segment.     Medications:     Scheduled Meds:   aspirin  81 mg Oral Daily    clopidogrel  75 mg Oral Daily    ciprofloxacin  250 mg Oral BID    pantoprazole  40 mg Oral QAM AC    psyllium  1 Package Oral Daily    sertraline  50 mg Oral Daily    sodium chloride flush  10 mL Intravenous 2 times per day    enoxaparin  40 mg Subcutaneous Daily    atorvastatin  40 mg Oral Nightly     Continuous Infusions:  PRN Meds:sodium chloride flush, magnesium hydroxide, ondansetron, acetaminophen    Objective:   Vitals:   T-max:  Patient Vitals for the past 8 hrs:   BP Temp Temp src Pulse Resp SpO2   09/09/19 0716 136/70 98.4 °F (36.9 °C) Oral 70 16 95 %   09/09/19 0543 127/68 98.3 °F (36.8 °C) Oral home situation as well-- at home had stroke and is dependent on her    Electronically signed by Amos Jordan MD on 9/8/19 at 10:43 AM

## 2019-09-09 NOTE — PROGRESS NOTES
Progress Note    Admit Date: 9/6/2019  Day: 3  Diet: DIET GENERAL;    CC: weakness    Interval history: No acute events overnight. Pt examined bedside this am and states that her strength and sensation it still slowly improving. Denies any other complaints. Case management is working on placement in a rehab facility with her  as she is his primary caretaker. HPI: 67 yo F PMH GERD, HTN, breast cancer, depression, p/w acute onset left arm and leg weakness that started around 2 PM today. Patient had a \"funny sensation\" and went to bed to sleep it off, however when she woke up she noticed this feeling did not go away. She was intermittently dizzy and walked down the karimi to the bathroom and suddenly fell down striking her left side of her head, but did not lose consciousness. She noticed her left arm and leg had lost strength and not returned.  helped her up and EMS was called. Patient denies similar symptoms in the past but medical history and MRI findings from today show a prior occipital stroke. Currently No complaints of fever, chills, headache, lightheadedness, dizziness, dyspnea, chest pain, abdominal pain, N/V/D/C. MRI brain showed acute lacunar infarct of lateral R thalamus and posterior limb of R internal capsule. CTA head also showed severe stenosis of R PCA P2 segment and severe narrowing of L STACIA A2 segment.     Medications:     Scheduled Meds:   aspirin  81 mg Oral Daily    clopidogrel  75 mg Oral Daily    ciprofloxacin  250 mg Oral BID    pantoprazole  40 mg Oral QAM AC    psyllium  1 Package Oral Daily    sertraline  50 mg Oral Daily    sodium chloride flush  10 mL Intravenous 2 times per day    enoxaparin  40 mg Subcutaneous Daily    atorvastatin  40 mg Oral Nightly     Continuous Infusions:  PRN Meds:sodium chloride flush, magnesium hydroxide, ondansetron, acetaminophen    Objective:   Vitals:   T-max:  Patient Vitals for the past 8 hrs:   BP Temp Temp src Pulse Resp SpO2

## 2019-09-09 NOTE — PROGRESS NOTES
09/09/2019    MCV 92.9 09/09/2019    MCH 31.3 09/09/2019    MCHC 33.7 09/09/2019    RDW 12.6 09/09/2019     09/09/2019    MPV 8.1 09/09/2019     BMP:    Lab Results   Component Value Date     09/09/2019    K 4.0 09/09/2019     09/09/2019    CO2 23 09/09/2019    BUN 16 09/09/2019    LABALBU 4.6 09/06/2019    CREATININE 0.7 09/09/2019    CALCIUM 8.7 09/09/2019    GFRAA >60 09/09/2019    GFRAA 89 03/27/2013    LABGLOM >60 09/09/2019    GLUCOSE 116 09/09/2019       Hepatic:   Recent Labs     09/06/19  1847   AST 18   ALT 17   BILITOT 0.4   ALKPHOS 63     INR:   Recent Labs     09/06/19  1847   INR 0.96       Studies:      CT HEAD WO CONTRAST   Final Result   No change from prior study. No acute infarct noted. No    evidence for hemorrhage or hematoma noted      MRI CERVICAL SPINE WO CONTRAST   Final Result      1. Mild spinal canal stenosis and moderate to severe bilateral neural foraminal stenoses at C5-6 and C6-7. MRI BRAIN WO CONTRAST   Final Result      1. Acute lacunar infarct involving the lateral aspect of the right thalamus and posterior limb of the right internal capsule. 2. Chronic small left occipital lobe infarct. 3. Mild cerebral atrophy. 4. Mild chronic small vessel ischemic disease. XR CHEST PORTABLE   Final Result      No acute pulmonary disease. CTA HEAD W CONTRAST   Final Result      1. Occlusion versus severe stenosis of the right posterior cerebral artery P2 segment. 2. Areas of severe narrowing of the left anterior cerebral artery A2 segment. 3. No significant stenosis in the extracranial vertebral or carotid arteries. 4. Multilevel neural foraminal narrowing in the cervical spine as described above. Findings were discussed with Dr. Willa Treviño by Dr. Slim Frias on 9/6/2019 at 7:13 PM.      CTA NECK W CONTRAST   Final Result      1. Occlusion versus severe stenosis of the right posterior cerebral artery P2 segment.       2. Areas of severe

## 2019-09-09 NOTE — PROGRESS NOTES
Physical Therapy  Facility/Department: [facility] 5T ORTHO/NEURO  Daily Treatment Note  NAME: Renetta Hale  :   MRN: [id number]    Date of Service: 2019    Discharge Recommendations:Amairani Talley scored a 17/24 on the AM-PAC short mobility form. Current research shows that an AM-PAC score of 17 or less is typically not associated with a discharge to the patient's home setting. Based on the patients AM-PAC score and their current functional mobility deficits, it is recommended that the patient have 5-7 sessions per week of Physical Therapy at d/c to increase the patients independence. PT Equipment Recommendations  Equipment Needed: (defer)    Assessment   Body structures, Functions, Activity limitations: Decreased functional mobility ; Decreased strength;Decreased safe awareness;Decreased balance;Decreased endurance  Assessment: Increased gt endurance. Continues to be below baseline function. CGA for transfers. Decreased balance with gait - needing CG to mod assist.  At risk for falls and not safe to ambulate alone. Needs cues for safety with transfers/gt, attention to L side. Rec cont skilled PT to maximize mobility and independence  Treatment Diagnosis: impaired functional mobility s/p CVA  Patient Education: Educated pt/family on role of PT, use of call light, safety with transfers/gt, discharge rec. Pt/ verbalized partial understanding - will need reinforcement  REQUIRES PT FOLLOW UP: Yes     Patient Diagnosis(es): The encounter diagnosis was Cerebrovascular accident (CVA), unspecified mechanism ([address] has a past medical history of Vernon's esophagus, Cancer ([address] Cerebrovascular accident (CVA) due to embolism of posterior cerebral artery with infarctions of both occipital lobes ([address] Colon polyp, Gallstones, GERD (gastroesophageal reflux disease), Hypertension, Kidney stone, Osteoporoses, Pregnancies, Shingles, Tubular adenoma nos, and UTI (urinary tract infection).    has a

## 2019-09-09 NOTE — PROGRESS NOTES
standing)        Balance  Sitting Balance: Stand by assistance  Standing Balance: Minimal assistance(CG/Min A)  Functional Mobility  Functional - Mobility Device: Rolling Walker  Activity: To/from bathroom; Other(in hallway)  Assist Level: Minimal assistance  Functional Mobility Comments: using wh walker - at times bumping into obstacles, increased time required and focus for LLE foot placement; L hand slipping off walker at times  Toilet Transfers  Toilet - Technique: Ambulating  Equipment Used: Standard toilet(w/ bar)  Toilet Transfer: Minimal assistance  Toilet Transfers Comments: Note: cues for body orientation at toilet  Bed mobility  Supine to Sit: Stand by assistance  Transfers  Sit to stand: Contact guard assistance  Stand to sit: Minimal assistance  Transfer Comments: needed VC for body orientation awareness at each surface        Coordination  Fine Motor: increased effort and decreased accuracy w/ finger opposition L hand; decreased accuracy w/ overhead clapping; fumbles w/ functional  of feeding/writing utensils - discussed recommendaiton to utilize built up red foam for writing utensil (to hold onto utensil in L-dominant hand - word searches; as a precursor to writing)           Vision  Patient Visual Report: Difficulty maintaining concentration with focus  Vision Comment: pt was able to read a paragraph in book - states difficulty maintaining focus; mild difficulty observed w/ line changes; discussed recommendation for word-search books (scanning activity as a precursor to reading)                                            Plan  This note will serve as a discharge summary in the event the patient is discharged prior to next treatment session.     Plan  Times per week: 5-7x  Times per day: Daily  Current Treatment Recommendations: Strengthening, Endurance Training, Balance Training, Functional Mobility Training, Safety Education & Training, Self-Care / ADL, Neuromuscular Re-education,

## 2019-09-09 NOTE — PROGRESS NOTES
Pt alert and oriented x4, VSS, IV ns locked in right arm. NIH score of 5. Pt c/o numbness in left arm. Family at bedside. Bed alarm on, bedside table and call light in reach.

## 2019-09-10 ENCOUNTER — TELEPHONE (OUTPATIENT)
Dept: FAMILY MEDICINE CLINIC | Age: 74
End: 2019-09-10

## 2019-09-10 LAB
ANION GAP SERPL CALCULATED.3IONS-SCNC: 12 MMOL/L (ref 3–16)
BASOPHILS ABSOLUTE: 0.1 K/UL (ref 0–0.2)
BASOPHILS RELATIVE PERCENT: 1 %
BUN BLDV-MCNC: 16 MG/DL (ref 7–20)
CALCIUM SERPL-MCNC: 8.6 MG/DL (ref 8.3–10.6)
CHLORIDE BLD-SCNC: 106 MMOL/L (ref 99–110)
CO2: 24 MMOL/L (ref 21–32)
CREAT SERPL-MCNC: 0.7 MG/DL (ref 0.6–1.2)
EOSINOPHILS ABSOLUTE: 0.2 K/UL (ref 0–0.6)
EOSINOPHILS RELATIVE PERCENT: 1.9 %
GFR AFRICAN AMERICAN: >60
GFR NON-AFRICAN AMERICAN: >60
GLUCOSE BLD-MCNC: 118 MG/DL (ref 70–99)
HCT VFR BLD CALC: 41 % (ref 36–48)
HEMOGLOBIN: 14.1 G/DL (ref 12–16)
LYMPHOCYTES ABSOLUTE: 2.3 K/UL (ref 1–5.1)
LYMPHOCYTES RELATIVE PERCENT: 23.6 %
MCH RBC QN AUTO: 31.3 PG (ref 26–34)
MCHC RBC AUTO-ENTMCNC: 34.2 G/DL (ref 31–36)
MCV RBC AUTO: 91.4 FL (ref 80–100)
MONOCYTES ABSOLUTE: 1 K/UL (ref 0–1.3)
MONOCYTES RELATIVE PERCENT: 10.6 %
NEUTROPHILS ABSOLUTE: 6 K/UL (ref 1.7–7.7)
NEUTROPHILS RELATIVE PERCENT: 62.9 %
PDW BLD-RTO: 12.9 % (ref 12.4–15.4)
PLATELET # BLD: 275 K/UL (ref 135–450)
PMV BLD AUTO: 7.8 FL (ref 5–10.5)
POTASSIUM REFLEX MAGNESIUM: 4 MMOL/L (ref 3.5–5.1)
RBC # BLD: 4.49 M/UL (ref 4–5.2)
SODIUM BLD-SCNC: 142 MMOL/L (ref 136–145)
WBC # BLD: 9.6 K/UL (ref 4–11)

## 2019-09-10 PROCEDURE — 36415 COLL VENOUS BLD VENIPUNCTURE: CPT

## 2019-09-10 PROCEDURE — 80048 BASIC METABOLIC PNL TOTAL CA: CPT

## 2019-09-10 PROCEDURE — 6370000000 HC RX 637 (ALT 250 FOR IP): Performed by: STUDENT IN AN ORGANIZED HEALTH CARE EDUCATION/TRAINING PROGRAM

## 2019-09-10 PROCEDURE — 97116 GAIT TRAINING THERAPY: CPT

## 2019-09-10 PROCEDURE — 97530 THERAPEUTIC ACTIVITIES: CPT

## 2019-09-10 PROCEDURE — 85025 COMPLETE CBC W/AUTO DIFF WBC: CPT

## 2019-09-10 PROCEDURE — 6370000000 HC RX 637 (ALT 250 FOR IP)

## 2019-09-10 PROCEDURE — 1200000000 HC SEMI PRIVATE

## 2019-09-10 PROCEDURE — 6360000002 HC RX W HCPCS

## 2019-09-10 PROCEDURE — 99231 SBSQ HOSP IP/OBS SF/LOW 25: CPT | Performed by: FAMILY MEDICINE

## 2019-09-10 PROCEDURE — 2580000003 HC RX 258

## 2019-09-10 RX ORDER — POLYETHYLENE GLYCOL 3350 17 G/17G
17 POWDER, FOR SOLUTION ORAL 2 TIMES DAILY
Status: DISCONTINUED | OUTPATIENT
Start: 2019-09-10 | End: 2019-09-12

## 2019-09-10 RX ADMIN — POLYETHYLENE GLYCOL 3350 17 G: 17 POWDER, FOR SOLUTION ORAL at 10:47

## 2019-09-10 RX ADMIN — AMLODIPINE BESYLATE 5 MG: 5 TABLET ORAL at 10:48

## 2019-09-10 RX ADMIN — ASPIRIN 81 MG 81 MG: 81 TABLET ORAL at 10:48

## 2019-09-10 RX ADMIN — Medication 1 PACKET: at 10:47

## 2019-09-10 RX ADMIN — CIPROFLOXACIN HYDROCHLORIDE 250 MG: 250 TABLET, FILM COATED ORAL at 20:35

## 2019-09-10 RX ADMIN — PANTOPRAZOLE SODIUM 40 MG: 40 TABLET, DELAYED RELEASE ORAL at 06:04

## 2019-09-10 RX ADMIN — ENOXAPARIN SODIUM 40 MG: 40 INJECTION SUBCUTANEOUS at 10:47

## 2019-09-10 RX ADMIN — SERTRALINE HYDROCHLORIDE 50 MG: 50 TABLET ORAL at 10:48

## 2019-09-10 RX ADMIN — POLYETHYLENE GLYCOL 3350 17 G: 17 POWDER, FOR SOLUTION ORAL at 20:35

## 2019-09-10 RX ADMIN — ACETAMINOPHEN 650 MG: 325 TABLET ORAL at 20:07

## 2019-09-10 RX ADMIN — Medication 10 ML: at 10:56

## 2019-09-10 RX ADMIN — CLOPIDOGREL BISULFATE 75 MG: 75 TABLET ORAL at 10:48

## 2019-09-10 RX ADMIN — ATORVASTATIN CALCIUM 40 MG: 40 TABLET, FILM COATED ORAL at 20:35

## 2019-09-10 RX ADMIN — CIPROFLOXACIN HYDROCHLORIDE 250 MG: 250 TABLET, FILM COATED ORAL at 10:48

## 2019-09-10 ASSESSMENT — PAIN DESCRIPTION - PROGRESSION: CLINICAL_PROGRESSION: NOT CHANGED

## 2019-09-10 ASSESSMENT — PAIN DESCRIPTION - DESCRIPTORS: DESCRIPTORS: ACHING;CONSTANT

## 2019-09-10 ASSESSMENT — PAIN DESCRIPTION - ORIENTATION: ORIENTATION: MID

## 2019-09-10 ASSESSMENT — PAIN SCALES - GENERAL
PAINLEVEL_OUTOF10: 0
PAINLEVEL_OUTOF10: 3
PAINLEVEL_OUTOF10: 0

## 2019-09-10 ASSESSMENT — PAIN DESCRIPTION - FREQUENCY: FREQUENCY: CONTINUOUS

## 2019-09-10 ASSESSMENT — PAIN DESCRIPTION - ONSET: ONSET: ON-GOING

## 2019-09-10 ASSESSMENT — PAIN DESCRIPTION - PAIN TYPE: TYPE: ACUTE PAIN

## 2019-09-10 ASSESSMENT — PAIN - FUNCTIONAL ASSESSMENT: PAIN_FUNCTIONAL_ASSESSMENT: ACTIVITIES ARE NOT PREVENTED

## 2019-09-10 ASSESSMENT — PAIN DESCRIPTION - LOCATION: LOCATION: HEAD

## 2019-09-10 NOTE — PROGRESS NOTES
Occupational Therapy  Facility/Department: [facility] 5T ORTHO/NEURO  Daily Treatment Note  NAME: Odette Reyes  : [id number]  MRN: [id number]    Date of Service: 9/10/2019    Discharge Recommendations:  Odette Reyes scored a 17/24 on the AM-PAC ADL Inpatient form. Current research shows that an AM-PAC score of 17 or less is typically not associated with a discharge to the patient's home setting. Based on the patients AM-PAC score and their current ADL deficits, it is recommended that the patient have 5-7 sessions per week of Occupational Therapy at d/c to increase the patients independence. OT Equipment Recommendations  Equipment Needed: No  Other: defer to d/c facility    Assessment   Performance deficits / Impairments: Decreased functional mobility ; Decreased ADL status; Decreased balance;Decreased cognition;Decreased safe awareness;Decreased endurance;Decreased strength;Decreased fine motor control;Decreased coordination;Decreased high-level IADLs;Decreased vision/visual deficit; Decreased sensation  Assessment: Pt has impaired sensation and motor control of her dominant L UE- with difficulty grasping items from table level. Pt's transfers and mobility are improving, but she needs cues for safety due to decresed L visual/body awareness. Pt would benefut from intensive OT tx upon d/c. Pt would benefit from SLP cognitive evaluation to assess possible memory and processing deficits- discussed with pt and RN  Treatment Diagnosis: impaired ADLs, mobility, L strength and motor control s/p CVA  OT Education: OT Role;ADL Adaptive Strategies; Plan of Care;Transfer Training;Family Education  Patient Education: discussed d/c planning, motor control activities, visual scanning strategies- pt verb understanding, may need reinforcement  REQUIRES OT FOLLOW UP: Yes  Activity Tolerance  Activity Tolerance: Patient Tolerated treatment well  Safety Devices  Safety Devices in place: Yes  Type of devices: Left in chair;Chair alarm in place;Call light within reach;Nurse notified(pt eating lunch. sister and  present)         Patient Diagnosis(es): The encounter diagnosis was Cerebrovascular accident (CVA), unspecified mechanism ([facility] [address] has a past medical history of Vernon's esophagus, Cancer ([facility] [address] Cerebrovascular accident (CVA) due to embolism of posterior cerebral artery with infarctions of both occipital lobes ([facility] [address] Colon polyp, Gallstones, GERD (gastroesophageal reflux disease), Hypertension, Kidney stone, Osteoporoses, Pregnancies, Shingles, Tubular adenoma nos, and UTI (urinary tract infection). has a past surgical history that includes Cholecystectomy (1988); bladder repair (9/09); Colonoscopy (2008,3/9/2017); Hysterectomy; Appendectomy; Upper gastrointestinal endoscopy (2008); and Breast surgery (Left, 10/26/2017). Restrictions  Position Activity Restriction  Other position/activity restrictions: up with assist     Subjective   General  Chart Reviewed: Yes  Additional Pertinent Hx: 66year old presented with L sided weakness and had a fall at home. MRI: acute right thalamic and Internal capsule infarct . CT/CTA: cervical foraminal stenosis and right P2 segment severe stenosis vs occlusion. follow by neurosx- nothing planned  PMHx: breast cancer, partial L mastectomy, CVA occipital lobe, HTN  Response to previous treatment: Patient with no complaints from previous session  Family / Caregiver Present: Yes(sister and )  Diagnosis: CVA  Subjective  Subjective: Pt seated in bed, beginning to eat lunch upon OT entry. Pt agreeable to therapy. Pt reporting numbness on L side of face- new symptom today- RN made aware.   \"I veer to the R when I walk\"  Vital Signs  Patient Currently in Pain: Denies     Orientation  Orientation  Overall Orientation Status: Within Normal Limits  Objective             Balance  Sitting Balance: Supervision  Standing Balance: Contact guard assistance(static stance)  Standing Balance  Time: 10 min total  Activity: mobility in room/hallway, reaching task  Comment: dynamic standing at table reaching L UE to grap items outside JULITO- needing cga for balance      Functional Mobility  Functional - Mobility Device: Rolling Walker  Activity: Other(hallway 120')  Assist Level: Contact guard assistance  Functional Mobility Comments: occasional veering walker to R (pt aware), occasional difficulty sustaining L  of walker- hand slides forward on walker      Bed mobility  Supine to Sit: Stand by assistance     Transfers  Sit to stand: Contact guard assistance(eob, recliner)  Stand to sit: Contact guard assistance(eob, recliner)  Transfer Comments: needed VC for body orientation awareness at each surface        Coordination  Gross Motor: reaching L UE across body to grasp items on table with noted slow movements and decreased accuracy of reach. Needed increased time orient hand to grasp items due to impaired fine motor control           Vision  Vision Comment: noted difficulty with visual scanning and identifying items on table (straws, hand wipes, napkins)- needing increased time and occasional VC. provided wordsearch and contrasting highlighter for pt to practice visual scanning/attention.  discussed game she can play with family such as tic-tac-toe       Cognition  Overall Cognitive Status: Exceptions  Memory: Decreased recall of recent events  Insights: Decreased awareness of deficits  Cognition Comment: delayed processing noted            Plan   Plan  Times per week: 5-7x  Times per day: Daily  Current Treatment Recommendations: Strengthening, Endurance Training, Balance Training, Functional Mobility Training, Safety Education & Training, Self-Care / ADL, Neuromuscular Re-education, Patient/Caregiver Education & Training, Cognitive/Perceptual Training    AM-PAC Score        AM-[facility] Inpatient Daily Activity Raw Score: 17 (09/10/19 1421)  AM-PAC Inpatient ADL T-Scale Score : 37.26 (09/10/19

## 2019-09-10 NOTE — PLAN OF CARE
Problem: Falls - Risk of:  Goal: Will remain free from falls  Description  Will remain free from falls  9/9/2019 2345 by Shannon Cruz RN  Outcome: Ongoing     Fall risk precautions in place. Bed is locked and in lowest position. 2/4 side rails up. Call light within reach. Fall risk Bracelet in place. Frequent checks on patient. Free from falls at this time. Will continue to monitor. Problem: Mobility - Impaired:  Goal: Mobility will improve  Description  Mobility will improve  Outcome: Ongoing     Patient is ambulating with walker and assist x1, tolerating well.

## 2019-09-10 NOTE — PROGRESS NOTES
Progress Note    Admit Date: 9/6/2019  Day: 5  Diet: DIET GENERAL;    CC: weakness    Interval history: No acute events overnight. Pt examined bedside this am and states that her strength and sensation has continued to make improvements. Denies any other complaints. Awaiting pre-cert for ARU.    HPI: 65 yo F PMH GERD, HTN, breast cancer, depression, p/w acute onset left arm and leg weakness that started around 2 PM today. Patient had a \"funny sensation\" and went to bed to sleep it off, however when she woke up she noticed this feeling did not go away. She was intermittently dizzy and walked down the karimi to the bathroom and suddenly fell down striking her left side of her head, but did not lose consciousness. She noticed her left arm and leg had lost strength and not returned.  helped her up and EMS was called. Patient denies similar symptoms in the past but medical history and MRI findings from today show a prior occipital stroke. Currently No complaints of fever, chills, headache, lightheadedness, dizziness, dyspnea, chest pain, abdominal pain, N/V/D/C. MRI brain showed acute lacunar infarct of lateral R thalamus and posterior limb of R internal capsule. CTA head also showed severe stenosis of R PCA P2 segment and severe narrowing of L STACIA A2 segment.     Medications:     Scheduled Meds:   polyethylene glycol  17 g Oral BID    amLODIPine  5 mg Oral Daily    aspirin  81 mg Oral Daily    clopidogrel  75 mg Oral Daily    ciprofloxacin  250 mg Oral BID    pantoprazole  40 mg Oral QAM AC    psyllium  1 Package Oral Daily    sertraline  50 mg Oral Daily    sodium chloride flush  10 mL Intravenous 2 times per day    enoxaparin  40 mg Subcutaneous Daily    atorvastatin  40 mg Oral Nightly     Continuous Infusions:  PRN Meds:sodium chloride flush, magnesium hydroxide, ondansetron, acetaminophen    Objective:   Vitals:   T-max:  Patient Vitals for the past 8 hrs:   BP Temp Temp src Pulse Resp SpO2 09/10/19 0707 137/78 98.6 °F (37 °C) Oral 85 16 95 %   09/10/19 0229 (!) 146/69 98.8 °F (37.1 °C) Oral 86 16 97 %       Intake/Output Summary (Last 24 hours) at 9/10/2019 0858  Last data filed at 9/9/2019 2212  Gross per 24 hour   Intake 490 ml   Output --   Net 490 ml       Review of Systems   All other systems reviewed and are negative. Physical Exam   Constitutional: She is oriented to person, place, and time. She appears well-developed and well-nourished. No distress. HENT:   Head: Normocephalic and atraumatic. Mouth/Throat: Oropharynx is clear and moist.   Eyes: Pupils are equal, round, and reactive to light. Conjunctivae and EOM are normal.   Neck: Normal range of motion. Neck supple. Cardiovascular: Normal rate, regular rhythm, normal heart sounds and intact distal pulses. Pulmonary/Chest: Effort normal and breath sounds normal.   Abdominal: Soft. Bowel sounds are normal.   Musculoskeletal: Normal range of motion. Neurological: She is alert and oriented to person, place, and time. A sensory deficit is present. No cranial nerve deficit. Coordination normal.   Dysesthesia on LUE and LLE. 5/5 strength RUE and RLE and 4/5 strength LUE and LLE. Skin: Skin is warm and dry. Capillary refill takes less than 2 seconds. Psychiatric: She has a normal mood and affect. LABS:    CBC:   Recent Labs     09/08/19  0632 09/09/19  0646 09/10/19  0634   WBC 10.4 8.3 9.6   HGB 14.8 14.1 14.1   HCT 42.9 41.8 41.0    250 275   MCV 91.4 92.9 91.4     Renal:    Recent Labs     09/08/19  0632 09/09/19  0645 09/10/19  0634    143 142   K 3.9 4.0 4.0    107 106   CO2 24 23 24   BUN 17 16 16   CREATININE 0.7 0.7 0.7   GLUCOSE 113* 116* 118*   CALCIUM 8.9 8.7 8.6   ANIONGAP 13 13 12     Hepatic:   No results for input(s): AST, ALT, BILITOT, BILIDIR, PROT, LABALBU, ALKPHOS in the last 72 hours. Troponin:   No results for input(s): TROPONINI in the last 72 hours.   BNP: No results for input(s):

## 2019-09-11 LAB
ANION GAP SERPL CALCULATED.3IONS-SCNC: 14 MMOL/L (ref 3–16)
BASOPHILS ABSOLUTE: 0.2 K/UL (ref 0–0.2)
BASOPHILS RELATIVE PERCENT: 1.7 %
BUN BLDV-MCNC: 18 MG/DL (ref 7–20)
CALCIUM SERPL-MCNC: 8.7 MG/DL (ref 8.3–10.6)
CHLORIDE BLD-SCNC: 103 MMOL/L (ref 99–110)
CO2: 24 MMOL/L (ref 21–32)
CREAT SERPL-MCNC: 0.6 MG/DL (ref 0.6–1.2)
EOSINOPHILS ABSOLUTE: 0.2 K/UL (ref 0–0.6)
EOSINOPHILS RELATIVE PERCENT: 2.2 %
GFR AFRICAN AMERICAN: >60
GFR NON-AFRICAN AMERICAN: >60
GLUCOSE BLD-MCNC: 114 MG/DL (ref 70–99)
HCT VFR BLD CALC: 42.2 % (ref 36–48)
HEMOGLOBIN: 14.1 G/DL (ref 12–16)
LYMPHOCYTES ABSOLUTE: 1.8 K/UL (ref 1–5.1)
LYMPHOCYTES RELATIVE PERCENT: 20.9 %
MCH RBC QN AUTO: 31.2 PG (ref 26–34)
MCHC RBC AUTO-ENTMCNC: 33.5 G/DL (ref 31–36)
MCV RBC AUTO: 93.1 FL (ref 80–100)
MONOCYTES ABSOLUTE: 0.9 K/UL (ref 0–1.3)
MONOCYTES RELATIVE PERCENT: 9.7 %
NEUTROPHILS ABSOLUTE: 5.7 K/UL (ref 1.7–7.7)
NEUTROPHILS RELATIVE PERCENT: 65.5 %
PDW BLD-RTO: 12.7 % (ref 12.4–15.4)
PLATELET # BLD: 258 K/UL (ref 135–450)
PMV BLD AUTO: 7.9 FL (ref 5–10.5)
POTASSIUM REFLEX MAGNESIUM: 4.4 MMOL/L (ref 3.5–5.1)
RBC # BLD: 4.54 M/UL (ref 4–5.2)
SODIUM BLD-SCNC: 141 MMOL/L (ref 136–145)
WBC # BLD: 8.7 K/UL (ref 4–11)

## 2019-09-11 PROCEDURE — 97530 THERAPEUTIC ACTIVITIES: CPT

## 2019-09-11 PROCEDURE — 97535 SELF CARE MNGMENT TRAINING: CPT

## 2019-09-11 PROCEDURE — 6370000000 HC RX 637 (ALT 250 FOR IP)

## 2019-09-11 PROCEDURE — 6370000000 HC RX 637 (ALT 250 FOR IP): Performed by: STUDENT IN AN ORGANIZED HEALTH CARE EDUCATION/TRAINING PROGRAM

## 2019-09-11 PROCEDURE — 97116 GAIT TRAINING THERAPY: CPT

## 2019-09-11 PROCEDURE — 6360000002 HC RX W HCPCS

## 2019-09-11 PROCEDURE — 99231 SBSQ HOSP IP/OBS SF/LOW 25: CPT | Performed by: FAMILY MEDICINE

## 2019-09-11 PROCEDURE — 1200000000 HC SEMI PRIVATE

## 2019-09-11 PROCEDURE — 80048 BASIC METABOLIC PNL TOTAL CA: CPT

## 2019-09-11 PROCEDURE — 36415 COLL VENOUS BLD VENIPUNCTURE: CPT

## 2019-09-11 PROCEDURE — 2580000003 HC RX 258

## 2019-09-11 PROCEDURE — 92523 SPEECH SOUND LANG COMPREHEN: CPT

## 2019-09-11 PROCEDURE — 85025 COMPLETE CBC W/AUTO DIFF WBC: CPT

## 2019-09-11 RX ADMIN — PANTOPRAZOLE SODIUM 40 MG: 40 TABLET, DELAYED RELEASE ORAL at 06:11

## 2019-09-11 RX ADMIN — SERTRALINE HYDROCHLORIDE 50 MG: 50 TABLET ORAL at 09:08

## 2019-09-11 RX ADMIN — ATORVASTATIN CALCIUM 40 MG: 40 TABLET, FILM COATED ORAL at 23:05

## 2019-09-11 RX ADMIN — BISACODYL 10 MG: 5 TABLET, COATED ORAL at 10:41

## 2019-09-11 RX ADMIN — Medication 10 ML: at 23:06

## 2019-09-11 RX ADMIN — POLYETHYLENE GLYCOL 3350 17 G: 17 POWDER, FOR SOLUTION ORAL at 09:08

## 2019-09-11 RX ADMIN — AMLODIPINE BESYLATE 5 MG: 5 TABLET ORAL at 09:08

## 2019-09-11 RX ADMIN — ASPIRIN 81 MG 81 MG: 81 TABLET ORAL at 09:08

## 2019-09-11 RX ADMIN — ENOXAPARIN SODIUM 40 MG: 40 INJECTION SUBCUTANEOUS at 09:08

## 2019-09-11 RX ADMIN — Medication 10 ML: at 09:09

## 2019-09-11 RX ADMIN — Medication 1 PACKET: at 09:09

## 2019-09-11 RX ADMIN — CLOPIDOGREL BISULFATE 75 MG: 75 TABLET ORAL at 09:08

## 2019-09-11 ASSESSMENT — PAIN SCALES - GENERAL: PAINLEVEL_OUTOF10: 0

## 2019-09-11 NOTE — PROGRESS NOTES
SpO2   09/11/19 0712 (!) 162/82 98.7 °F (37.1 °C) Oral 91 16 94 %   09/11/19 0245 138/70 98.3 °F (36.8 °C) Oral 74 16 95 %       Intake/Output Summary (Last 24 hours) at 9/11/2019 0957  Last data filed at 9/11/2019 0507  Gross per 24 hour   Intake 240 ml   Output --   Net 240 ml       Review of Systems   All other systems reviewed and are negative. Physical Exam   Constitutional: She is oriented to person, place, and time. She appears well-developed and well-nourished. No distress. HENT:   Head: Normocephalic and atraumatic. Mouth/Throat: Oropharynx is clear and moist.   Eyes: Pupils are equal, round, and reactive to light. Conjunctivae and EOM are normal.   Neck: Normal range of motion. Neck supple. Cardiovascular: Normal rate, regular rhythm, normal heart sounds and intact distal pulses. Pulmonary/Chest: Effort normal and breath sounds normal.   Abdominal: Soft. Bowel sounds are normal.   Musculoskeletal: Normal range of motion. Neurological: She is alert and oriented to person, place, and time. A sensory deficit is present. No cranial nerve deficit. Coordination normal.   Dysesthesia on LUE and LLE. 5/5 strength RUE and RLE and 4/5 strength LUE and LLE. Skin: Skin is warm and dry. Capillary refill takes less than 2 seconds. Psychiatric: She has a normal mood and affect. LABS:    CBC:   Recent Labs     09/09/19  0646 09/10/19  0634 09/11/19  0612   WBC 8.3 9.6 8.7   HGB 14.1 14.1 14.1   HCT 41.8 41.0 42.2    275 258   MCV 92.9 91.4 93.1     Renal:    Recent Labs     09/09/19  0645 09/10/19  0634 09/11/19  0612    142 141   K 4.0 4.0 4.4    106 103   CO2 23 24 24   BUN 16 16 18   CREATININE 0.7 0.7 0.6   GLUCOSE 116* 118* 114*   CALCIUM 8.7 8.6 8.7   ANIONGAP 13 12 14     Hepatic:   No results for input(s): AST, ALT, BILITOT, BILIDIR, PROT, LABALBU, ALKPHOS in the last 72 hours. Troponin:   No results for input(s): TROPONINI in the last 72 hours.   BNP: No results for input(s): BNP in the last 72 hours. Lipids: No results for input(s): CHOL, HDL in the last 72 hours. Invalid input(s): LDLCALCU, TRIGLYCERIDE  ABGs:  No results for input(s): PHART, VOQ5JWX, PO2ART, LEW8QGK, BEART, THGBART, J6HXLTDK, YXO3IFA in the last 72 hours. INR:   No results for input(s): INR in the last 72 hours. Lactate:   No results for input(s): LACTATE in the last 72 hours. Cultures:  -----------------------------------------------------------------  RAD:   CT HEAD WO CONTRAST   Final Result   No change from prior study. No acute infarct noted. No    evidence for hemorrhage or hematoma noted      MRI CERVICAL SPINE WO CONTRAST   Final Result      1. Mild spinal canal stenosis and moderate to severe bilateral neural foraminal stenoses at C5-6 and C6-7. MRI BRAIN WO CONTRAST   Final Result      1. Acute lacunar infarct involving the lateral aspect of the right thalamus and posterior limb of the right internal capsule. 2. Chronic small left occipital lobe infarct. 3. Mild cerebral atrophy. 4. Mild chronic small vessel ischemic disease. XR CHEST PORTABLE   Final Result      No acute pulmonary disease. CTA HEAD W CONTRAST   Final Result      1. Occlusion versus severe stenosis of the right posterior cerebral artery P2 segment. 2. Areas of severe narrowing of the left anterior cerebral artery A2 segment. 3. No significant stenosis in the extracranial vertebral or carotid arteries. 4. Multilevel neural foraminal narrowing in the cervical spine as described above. Findings were discussed with Dr. Shady Cano by Dr. Kyra Chow on 9/6/2019 at 7:13 PM.      CTA NECK W CONTRAST   Final Result      1. Occlusion versus severe stenosis of the right posterior cerebral artery P2 segment. 2. Areas of severe narrowing of the left anterior cerebral artery A2 segment. 3. No significant stenosis in the extracranial vertebral or carotid arteries.       4. Multilevel neural foraminal narrowing in the cervical spine as described above. Findings were discussed with Dr. Ayaan Howe by Dr. Bernadette Weber on 9/6/2019 at 7:13 PM.      CT HEAD WO CONTRAST   Final Result      1. No acute intracranial abnormality by CT criteria. 2. Mild cerebral atrophy. 3. Mild chronic small vessel ischemic disease. Assessment/Plan:     Lacunar stroke, Right - Pure motor hemiparesis. MRI brain showed acute lacunar infarct of lateral R thalamus and posterior limb of R internal capsule.  CTA head also showed severe stenosis of R PCA P2 segment and severe narrowing of L STACIA A2 segment  - DAPT with ASA 81 mg PO qD and Plavix 75 mg PO qD for 90 days, followed by monotherapy with ASA thereafter, per neurology recs  - Atorvastatin 40 mg qd  - Neurochecks q4h  - Echo - no acute findings  - Neurology consulted, appreciate recs  - Neurosurgery consulted, medical management for now  - PT/OT and Speech  - outpatient cardiac monitoring, cardiology EPS NP to arrange as outpatient  - ARU placement approved by PM&R - awaiting pre-cert     Cervical neural foraminal stenosis and spinal canal stenosis - C5-6, C6-7  - Neurosurgery consulted, appreciate recs     Acute simple Cystitis - resolved  - Cipro course completed     GERD  - Protonix qd     HTN  - permissive HTN was allowed for 24 hours  - BP currently well controlled     Breast cancer - Dx 10/2017 stage IA (Left breast central portion, T1b, N0, M0), ER+,NY+, HER-2/dora neg invasive ductal carcinoma, s/p XRT, chemo with anastrozole, and partial mastectomy     Depression   - continue sertraline 50 mg qd    Code Status:Full Code  FEN: DIET GENERAL;  PPX: SCDs  DISPO: Floor    Pablo Rosales DO, PGY-1  09/11/19  9:57 AM    This patient has been staffed and discussed with Gilda Addison MD.    Attending Supervising Physicians Attestation Statement  I was present with the resident physician during the history and exam. I discussed the findings and plans with the resident physician and agree as documented in his note     Electronically signed by Valentín Das MD on 9/11/19 at 5:24 PM

## 2019-09-11 NOTE — PROGRESS NOTES
Speech Language Pathology  [facility]/NEURO  Initial Speech/Language/Cognitive Assessment    NAME: Keith Childress  : 1945   MRN: [id number]  ADMISSION DATE: 2019  ADMITTING DIAGNOSIS: has GERD (gastroesophageal reflux disease); [place] esophagus; Balance disorder; Osteoporosis; Lumbar radiculopathy; Colon polyp; Depression; Malignant neoplasm of upper-outer quadrant of left female breast ([address] Acquired contour deformity of breast; Cerebrovascular accident (CVA) due to embolism of posterior cerebral artery with infarctions of both occipital lobes ([address] Visual field defect; Cervical myelopathy ([address] Costochondritis; Hematuria; Cystitis; Lacunar infarct, acute ([address] Cerebral vascular disease; and 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  DATE ONSET: 19    Date of Eval: 2019   Evaluating Therapist: FITZ Leonard    RECENT RESULTS  MRI (19)  1. Acute lacunar infarct involving the lateral aspect of the right thalamus and posterior limb of the right internal capsule. 2. Chronic small left occipital lobe infarct. 3. Mild cerebral atrophy. 4. Mild chronic small vessel ischemic disease. Primary Complaint: denied any concerns for cognition; at end of session, endorsed immediate memory tasks more difficult to complete than at baseline - \"a little jumbled up\"    Pain:  Denied    Assessment:  Cognitive Diagnosis: mild cognitive communication impairment marked by reduced working memory, slowed processing speed, and reduced thought organization   Diagnosis: Cognitive communication impairment. Ongoing assessment warranted to differentiate potential language vs cognitive deficits given pt's L hand dominance. OT notes indicate pt with L inattention but attention to L field WFL for tabletop task x 1. Ongoing evaluation of visual scanning for reading / writing recommended.  Mildly reduced insight into deficits noted as well administered portions of the [employer] (RIPA-) with the following results:  · Immediate Memory - mild  · Categorical Vocabulary - mild    Pt was administered the Cognitive Linguistic Quick Test (CLQT) with the following results:  Clock Drawing Severity Rating- WNL      Prognosis:  Speech Therapy Prognosis  Prognosis: Good  Prognosis Considerations: Participation Level;Previous Level of Function;Severity of Impairments;Potential  Individuals consulted  Consulted and agree with results and recommendations: Patient; Family member;RN  Family member consulted:  and sister    Education:  Patient Education: Educated pt and family to role of SLP, purpose of evaluation, CVA, impact CVA can have on language / cognition / speech, role of hand dominance and deficits associated with R hemisphere CVA, results of session, and recommended goals and POC. Patient Education Response: Verbalizes understanding  Safety Devices in place: Yes  Type of devices: Bed alarm in place; Left in bed;Call light within reach;Nurse notified         Therapy Time:   Individual Concurrent Group Co-treatment   Time In 1350 0000 0000 0000   Time Out 1420 0000 0000 0000   Minutes 30 0 0 0   Variance: 0  Timed Code Treatment Minutes: 0 Minutes  Total Treatment Time: 30     Tracy Hudson MA CCC-SLP; [id number]  Speech-Language Pathologist  Pager # [phone]    If patient is discharged prior to next session, this note will serve as discharge summary.

## 2019-09-11 NOTE — PLAN OF CARE
SLP evaluation completed. Please refer to EMR.     Obi Doty MA CCC-SLP; [id number]  Speech-Language Pathologist

## 2019-09-11 NOTE — DISCHARGE SUMMARY
HGB 14.2 09/13/2019    HCT 41.1 09/13/2019     09/13/2019       Renal:    Lab Results   Component Value Date     09/13/2019    K 3.7 09/13/2019     09/13/2019    CO2 24 09/13/2019    BUN 17 09/13/2019    CREATININE 0.7 09/13/2019    CALCIUM 8.8 09/13/2019       Radiology:   CT HEAD WO CONTRAST   Final Result   No change from prior study. No acute infarct noted. No    evidence for hemorrhage or hematoma noted      MRI CERVICAL SPINE WO CONTRAST   Final Result      1. Mild spinal canal stenosis and moderate to severe bilateral neural foraminal stenoses at C5-6 and C6-7. MRI BRAIN WO CONTRAST   Final Result      1. Acute lacunar infarct involving the lateral aspect of the right thalamus and posterior limb of the right internal capsule. 2. Chronic small left occipital lobe infarct. 3. Mild cerebral atrophy. 4. Mild chronic small vessel ischemic disease. XR CHEST PORTABLE   Final Result      No acute pulmonary disease. CTA HEAD W CONTRAST   Final Result      1. Occlusion versus severe stenosis of the right posterior cerebral artery P2 segment. 2. Areas of severe narrowing of the left anterior cerebral artery A2 segment. 3. No significant stenosis in the extracranial vertebral or carotid arteries. 4. Multilevel neural foraminal narrowing in the cervical spine as described above. Findings were discussed with Dr. Kenney Daley by Dr. Gladis Bosworth on 9/6/2019 at 7:13 PM.      CTA NECK W CONTRAST   Final Result      1. Occlusion versus severe stenosis of the right posterior cerebral artery P2 segment. 2. Areas of severe narrowing of the left anterior cerebral artery A2 segment. 3. No significant stenosis in the extracranial vertebral or carotid arteries. 4. Multilevel neural foraminal narrowing in the cervical spine as described above.       Findings were discussed with Dr. Kenney Daley by Dr. Gladis Bosworth on 9/6/2019 at 7:13 PM.      [address]   Final Result 1. No acute intracranial abnormality by CT criteria. 2. Mild cerebral atrophy. 3. Mild chronic small vessel ischemic disease.                 Consults:     IP CONSULT TO PHARMACY  IP CONSULT TO PRIMARY CARE PROVIDER  IP CONSULT TO NEUROSURGERY  IP CONSULT TO RESIDENT INTERNAL MEDICINE  IP CONSULT TO NEUROLOGY  IP CONSULT TO SOCIAL WORK  IP CONSULT TO PHYSICAL MEDICINE REHAB  IP CONSULT TO CASE MANAGEMENT        Condition at Discharge: Stable    Discharge Instructions/Follow-up:  See dc instructions on chart      Activity: activity as tolerated    Diet: DIET GENERAL;       Discharge Medications:        Medication List      START taking these medications    amLODIPine 5 MG tablet  Commonly known as:  NORVASC  Take 1 tablet by mouth daily  Start taking on:  9/14/2019     atorvastatin 40 MG tablet  Commonly known as:  LIPITOR  Take 1 tablet by mouth nightly     clopidogrel 75 MG tablet  Commonly known as:  PLAVIX  Take 1 tablet by mouth daily  Start taking on:  9/14/2019        CONTINUE taking these medications    alendronate 70 MG tablet  Commonly known as:  FOSAMAX  TAKE 1 TABLET BY MOUTH ONCE A WEEK ( EVERY 7 DAYS )     aspirin 81 MG tablet  Take 1 tablet by mouth daily     calcium carbonate 600 MG Tabs tablet     METAMUCIL PO     MULTIVITAMIN PO     omeprazole 10 MG delayed release capsule  Commonly known as:  PRILOSEC  Take 1 capsule by mouth Daily     sertraline 50 MG tablet  Commonly known as:  ZOLOFT  TAKE 1 TABLET BY MOUTH ONCE DAILY     Turmeric 500 MG Caps     vitamin C 250 MG tablet        STOP taking these medications    ciprofloxacin 250 MG tablet  Commonly known as:  CIPRO     vitamin D-3 1000 units Caps           Where to Get Your Medications      You can get these medications from any pharmacy    Bring a paper prescription for each of these medications  · amLODIPine 5 MG tablet  · atorvastatin 40 MG tablet  · clopidogrel 75 MG tablet           Time Spent on discharge 40 minutes in the examination, evaluation, counseling and review of medications and discharge plan with the patient and/or caregiver. The patient Mackenzie King was advised to consult their PCP/ or call 911 to proceed to nearest ED in the event if symptoms are not getting better and are getting worse . The note was completed using EMR. Every effort was made to ensure accuracy; however, inadvertent computerized transcription errors may be present. Electronically Signed:  Dustin Polanco DO PGY-1  9/13/2019  Attending Supervising Physician's Sudeep Dai Statement  I performed a history and physical examination on the patient and discussed the management with the resident physician. I reviewed and agree with the findings and plan as documented in his note .     Electronically signed by Arnold Cha MD on 9/13/19 at 6:13 PM

## 2019-09-12 ENCOUNTER — TELEPHONE (OUTPATIENT)
Dept: CARDIOLOGY CLINIC | Age: 74
End: 2019-09-12

## 2019-09-12 PROBLEM — I10 BENIGN ESSENTIAL HTN: Status: ACTIVE | Noted: 2019-09-12

## 2019-09-12 LAB
ANION GAP SERPL CALCULATED.3IONS-SCNC: 13 MMOL/L (ref 3–16)
BASOPHILS ABSOLUTE: 0.1 K/UL (ref 0–0.2)
BASOPHILS RELATIVE PERCENT: 1.2 %
BUN BLDV-MCNC: 21 MG/DL (ref 7–20)
CALCIUM SERPL-MCNC: 8.8 MG/DL (ref 8.3–10.6)
CHLORIDE BLD-SCNC: 105 MMOL/L (ref 99–110)
CO2: 24 MMOL/L (ref 21–32)
CREAT SERPL-MCNC: 0.7 MG/DL (ref 0.6–1.2)
EOSINOPHILS ABSOLUTE: 0.2 K/UL (ref 0–0.6)
EOSINOPHILS RELATIVE PERCENT: 2 %
GFR AFRICAN AMERICAN: >60
GFR NON-AFRICAN AMERICAN: >60
GLUCOSE BLD-MCNC: 115 MG/DL (ref 70–99)
HCT VFR BLD CALC: 41 % (ref 36–48)
HEMOGLOBIN: 14.1 G/DL (ref 12–16)
LYMPHOCYTES ABSOLUTE: 2.2 K/UL (ref 1–5.1)
LYMPHOCYTES RELATIVE PERCENT: 21.8 %
MCH RBC QN AUTO: 31.4 PG (ref 26–34)
MCHC RBC AUTO-ENTMCNC: 34.3 G/DL (ref 31–36)
MCV RBC AUTO: 91.5 FL (ref 80–100)
MONOCYTES ABSOLUTE: 1 K/UL (ref 0–1.3)
MONOCYTES RELATIVE PERCENT: 9.9 %
NEUTROPHILS ABSOLUTE: 6.6 K/UL (ref 1.7–7.7)
NEUTROPHILS RELATIVE PERCENT: 65.1 %
PDW BLD-RTO: 12.8 % (ref 12.4–15.4)
PLATELET # BLD: 261 K/UL (ref 135–450)
PMV BLD AUTO: 7.7 FL (ref 5–10.5)
POTASSIUM REFLEX MAGNESIUM: 3.6 MMOL/L (ref 3.5–5.1)
RBC # BLD: 4.48 M/UL (ref 4–5.2)
SODIUM BLD-SCNC: 142 MMOL/L (ref 136–145)
WBC # BLD: 10.2 K/UL (ref 4–11)

## 2019-09-12 PROCEDURE — 2580000003 HC RX 258

## 2019-09-12 PROCEDURE — 97116 GAIT TRAINING THERAPY: CPT

## 2019-09-12 PROCEDURE — 80048 BASIC METABOLIC PNL TOTAL CA: CPT

## 2019-09-12 PROCEDURE — 85025 COMPLETE CBC W/AUTO DIFF WBC: CPT

## 2019-09-12 PROCEDURE — 97110 THERAPEUTIC EXERCISES: CPT

## 2019-09-12 PROCEDURE — 6370000000 HC RX 637 (ALT 250 FOR IP): Performed by: STUDENT IN AN ORGANIZED HEALTH CARE EDUCATION/TRAINING PROGRAM

## 2019-09-12 PROCEDURE — 97127 HC SP THER IVNTJ W/FOCUS COG FUNCJ: CPT

## 2019-09-12 PROCEDURE — 6370000000 HC RX 637 (ALT 250 FOR IP)

## 2019-09-12 PROCEDURE — 6360000002 HC RX W HCPCS

## 2019-09-12 PROCEDURE — 1200000000 HC SEMI PRIVATE

## 2019-09-12 PROCEDURE — 99231 SBSQ HOSP IP/OBS SF/LOW 25: CPT | Performed by: FAMILY MEDICINE

## 2019-09-12 PROCEDURE — 36415 COLL VENOUS BLD VENIPUNCTURE: CPT

## 2019-09-12 RX ORDER — POLYETHYLENE GLYCOL 3350 17 G/17G
17 POWDER, FOR SOLUTION ORAL DAILY
Status: DISCONTINUED | OUTPATIENT
Start: 2019-09-13 | End: 2019-09-13 | Stop reason: HOSPADM

## 2019-09-12 RX ADMIN — PANTOPRAZOLE SODIUM 40 MG: 40 TABLET, DELAYED RELEASE ORAL at 06:30

## 2019-09-12 RX ADMIN — ATORVASTATIN CALCIUM 40 MG: 40 TABLET, FILM COATED ORAL at 20:38

## 2019-09-12 RX ADMIN — SERTRALINE HYDROCHLORIDE 50 MG: 50 TABLET ORAL at 09:19

## 2019-09-12 RX ADMIN — ASPIRIN 81 MG 81 MG: 81 TABLET ORAL at 09:19

## 2019-09-12 RX ADMIN — CLOPIDOGREL BISULFATE 75 MG: 75 TABLET ORAL at 09:19

## 2019-09-12 RX ADMIN — AMLODIPINE BESYLATE 5 MG: 5 TABLET ORAL at 09:19

## 2019-09-12 RX ADMIN — Medication 10 ML: at 09:20

## 2019-09-12 RX ADMIN — Medication 10 ML: at 20:38

## 2019-09-12 RX ADMIN — ENOXAPARIN SODIUM 40 MG: 40 INJECTION SUBCUTANEOUS at 09:19

## 2019-09-12 ASSESSMENT — PAIN SCALES - GENERAL
PAINLEVEL_OUTOF10: 0

## 2019-09-12 NOTE — PROGRESS NOTES
noted. No    evidence for hemorrhage or hematoma noted      MRI CERVICAL SPINE WO CONTRAST   Final Result      1. Mild spinal canal stenosis and moderate to severe bilateral neural foraminal stenoses at C5-6 and C6-7. MRI BRAIN WO CONTRAST   Final Result      1. Acute lacunar infarct involving the lateral aspect of the right thalamus and posterior limb of the right internal capsule. 2. Chronic small left occipital lobe infarct. 3. Mild cerebral atrophy. 4. Mild chronic small vessel ischemic disease. XR CHEST PORTABLE   Final Result      No acute pulmonary disease. CTA HEAD W CONTRAST   Final Result      1. Occlusion versus severe stenosis of the right posterior cerebral artery P2 segment. 2. Areas of severe narrowing of the left anterior cerebral artery A2 segment. 3. No significant stenosis in the extracranial vertebral or carotid arteries. 4. Multilevel neural foraminal narrowing in the cervical spine as described above. Findings were discussed with Dr. Hines Found by Dr. Jasmyne Hilario on 9/6/2019 at 7:13 PM.      CTA NECK W CONTRAST   Final Result      1. Occlusion versus severe stenosis of the right posterior cerebral artery P2 segment. 2. Areas of severe narrowing of the left anterior cerebral artery A2 segment. 3. No significant stenosis in the extracranial vertebral or carotid arteries. 4. Multilevel neural foraminal narrowing in the cervical spine as described above. Findings were discussed with Dr. Hines Found by Dr. Jasmyne Hilario on 9/6/2019 at 7:13 PM.      CT HEAD WO CONTRAST   Final Result      1. No acute intracranial abnormality by CT criteria. 2. Mild cerebral atrophy. 3. Mild chronic small vessel ischemic disease.            EKG: -  Echo: -  Micro: -    Objective:   Vitals: /70   Pulse 77   Temp 98.6 °F (37 °C) (Oral)   Resp 16   Ht 5' 3\" (1.6 m)   Wt 145 lb (65.8 kg)   SpO2 95%   BMI 25.69 kg/m²   General appearance: alert, appears

## 2019-09-12 NOTE — PROGRESS NOTES
was able to describe how two items were similar and how they were different with 90% accuracy. The pt was able to state 2-3 uses for an object with min cues. For both tasks, the pt required speed of processing was mildly delayed. con't goal     Goal 3: Patient will tolerate ongoing cognitive-linguistic assessment  9/12-  The pt was able to proved 3 solutions with routine problems with min cues. con't goal       Education:  Pt educated to the purpose of the visit. Pt stated comprehension       Plan:  Continue speech/language therapy to address above goals, 3-5 x/week x LOS  DC recommendations:TBD closer to discharge   D/W nursing, Clemy  Needs met prior to leaving room, call button in reach.   Treatment time:28      Grandville Meigs, MA, Jose Almaraz  Speech-Language Pathologist  Pager [phone]    If patient is discharged prior to next treatment, this note will serve as the discharge summary

## 2019-09-12 NOTE — PROGRESS NOTES
Progress Note    Admit Date: 9/6/2019  Day: 7  Diet: DIET GENERAL;    CC: weakness    Interval history: No acute events overnight. Pt examined bedside this am and states that she is continuing to make improvement, especially with ambulation. Denies any complaints. Awaiting bed availablility for ARU, which will be tomorrow. HPI: 65 yo F PMH GERD, HTN, breast cancer, depression, p/w acute onset left arm and leg weakness that started around 2 PM today. Patient had a \"funny sensation\" and went to bed to sleep it off, however when she woke up she noticed this feeling did not go away. She was intermittently dizzy and walked down the karimi to the bathroom and suddenly fell down striking her left side of her head, but did not lose consciousness. She noticed her left arm and leg had lost strength and not returned.  helped her up and EMS was called. Patient denies similar symptoms in the past but medical history and MRI findings from today show a prior occipital stroke. Currently No complaints of fever, chills, headache, lightheadedness, dizziness, dyspnea, chest pain, abdominal pain, N/V/D/C. MRI brain showed acute lacunar infarct of lateral R thalamus and posterior limb of R internal capsule. CTA head also showed severe stenosis of R PCA P2 segment and severe narrowing of L STACIA A2 segment.     Medications:     Scheduled Meds:   [START ON 9/13/2019] polyethylene glycol  17 g Oral Daily    amLODIPine  5 mg Oral Daily    aspirin  81 mg Oral Daily    clopidogrel  75 mg Oral Daily    pantoprazole  40 mg Oral QAM AC    psyllium  1 Package Oral Daily    sertraline  50 mg Oral Daily    sodium chloride flush  10 mL Intravenous 2 times per day    enoxaparin  40 mg Subcutaneous Daily    atorvastatin  40 mg Oral Nightly     Continuous Infusions:  PRN Meds:sodium chloride flush, magnesium hydroxide, ondansetron, acetaminophen    Objective:   Vitals:   T-max:  Patient Vitals for the past 8 hrs:   BP Temp Temp src results for input(s): BNP in the last 72 hours. Lipids: No results for input(s): CHOL, HDL in the last 72 hours. Invalid input(s): LDLCALCU, TRIGLYCERIDE  ABGs:  No results for input(s): PHART, WZM6YLQ, PO2ART, YGG5PIK, BEART, THGBART, Q8AESOWG, PSQ9WPG in the last 72 hours. INR:   No results for input(s): INR in the last 72 hours. Lactate:   No results for input(s): LACTATE in the last 72 hours. Cultures:  -----------------------------------------------------------------  RAD:   CT HEAD WO CONTRAST   Final Result   No change from prior study. No acute infarct noted. No    evidence for hemorrhage or hematoma noted      MRI CERVICAL SPINE WO CONTRAST   Final Result      1. Mild spinal canal stenosis and moderate to severe bilateral neural foraminal stenoses at C5-6 and C6-7. MRI BRAIN WO CONTRAST   Final Result      1. Acute lacunar infarct involving the lateral aspect of the right thalamus and posterior limb of the right internal capsule. 2. Chronic small left occipital lobe infarct. 3. Mild cerebral atrophy. 4. Mild chronic small vessel ischemic disease. XR CHEST PORTABLE   Final Result      No acute pulmonary disease. CTA HEAD W CONTRAST   Final Result      1. Occlusion versus severe stenosis of the right posterior cerebral artery P2 segment. 2. Areas of severe narrowing of the left anterior cerebral artery A2 segment. 3. No significant stenosis in the extracranial vertebral or carotid arteries. 4. Multilevel neural foraminal narrowing in the cervical spine as described above. Findings were discussed with Dr. Julian Ocasio by Dr. Harjeet Lynch on 9/6/2019 at 7:13 PM.      CTA NECK W CONTRAST   Final Result      1. Occlusion versus severe stenosis of the right posterior cerebral artery P2 segment. 2. Areas of severe narrowing of the left anterior cerebral artery A2 segment. 3. No significant stenosis in the extracranial vertebral or carotid arteries.       4. Multilevel neural foraminal narrowing in the cervical spine as described above. Findings were discussed with Dr. Cecile Alexander by Dr. Carlos Sheehan on 9/6/2019 at 7:13 PM.      CT HEAD WO CONTRAST   Final Result      1. No acute intracranial abnormality by CT criteria. 2. Mild cerebral atrophy. 3. Mild chronic small vessel ischemic disease. Assessment/Plan:     Lacunar stroke, Right - Pure motor hemiparesis. MRI brain showed acute lacunar infarct of lateral R thalamus and posterior limb of R internal capsule.  CTA head also showed severe stenosis of R PCA P2 segment and severe narrowing of L STACIA A2 segment  - DAPT with ASA 81 mg PO qD and Plavix 75 mg PO qD for 90 days, followed by monotherapy with ASA thereafter, per neurology recs  - Atorvastatin 40 mg qd  - Neurochecks q4h  - Echo - no acute findings  - Neurology consulted, appreciate recs  - Neurosurgery consulted, medical management for now  - PT/OT and Speech  - outpatient cardiac monitoring, cardiology EPS NP to arrange as outpatient  - ARU placement approved by PM&R - awaiting bed     Cervical neural foraminal stenosis and spinal canal stenosis - C5-6, C6-7  - Neurosurgery consulted, no surgical intervention     GERD  - Protonix qd     HTN  - permissive HTN was allowed for 24 hours  - BP currently well controlled     Breast cancer - Dx 10/2017 stage IA (Left breast central portion, T1b, N0, M0), ER+,SD+, HER-2/dora neg invasive ductal carcinoma, s/p XRT, chemo with anastrozole, and partial mastectomy     Depression   - continue sertraline 50 mg qd    Code Status:Full Code  FEN: DIET GENERAL;  PPX: SCDs  DISPO: Floor    Kip DO Saurabh, PGY-1  09/12/19  9:52 AM    This patient has been staffed and discussed with Melodie Pinedo MD.

## 2019-09-12 NOTE — CONSULTS
Consult Note  Physical Medicine and Rehabilitation    Patient: Jennie Goldmann  [id number]  Date: 9/12/2019      Chief Complaint: CVA    History of Present Illness/Hospital Course:  72yo F with a PMH of GERD, HTN, breast cancer, and depression presented to the hospital with acute onset of left arm and leg weakness. She said it started earlier that day and she tried to go to bed to sleep it off. Upon waking up She noticed her left arm and leg were weaker and were not improving. EMS was called and she was brought to the hospital. MRI showed prior occipital CVA. ACUTE Right sided Lacunar stroke identified on MRI. Pure motor hemiparesis. She was stabilized on the acute medicine floor with permissive HTN and monitoring.  She has improved with PT/OT to the point where she will greatly benefit from an intensive rehab program.      Prior Level of Function:  Independent for mobility, ADLs, and IADLs    Current Level of Function:  Mod/max assist         Past Medical History:   Diagnosis Date    Vernon's esophagus     Cancer ([facility] [address]     breast    Cerebrovascular accident (CVA) due to embolism of posterior cerebral artery with infarctions of both occipital lobes ([facility] [address] 1/10/2018    Colon polyp 1/15/2016    Gallstones     GERD (gastroesophageal reflux disease)     Hypertension     Kidney stone     Osteoporoses     Pregnancies     2- vaginal deliveries- 2    Shingles     Tubular adenoma nos     UTI (urinary tract infection)        Past Surgical History:   Procedure Laterality Date    APPENDECTOMY      BLADDER REPAIR  9/09    cah    BREAST SURGERY Left 10/26/2017    Left breastpartial mastectomy, needle localization, lymph node dissection    CHOLECYSTECTOMY  1988    COLONOSCOPY  2008,3/9/2017    polyp    HYSTERECTOMY      UPPER GASTROINTESTINAL ENDOSCOPY  2008       Family History   Problem Relation Age of Onset    Breast Cancer Mother     Coronary Art Dis Mother     Cirrhosis Mother     Cancer Mother 67

## 2019-09-13 ENCOUNTER — HOSPITAL ENCOUNTER (INPATIENT)
Age: 74
LOS: 12 days | Discharge: HOME HEALTH | DRG: 057 | End: 2019-09-25
Attending: PHYSICAL MEDICINE & REHABILITATION | Admitting: PHYSICAL MEDICINE & REHABILITATION
Payer: MEDICARE

## 2019-09-13 VITALS
SYSTOLIC BLOOD PRESSURE: 134 MMHG | DIASTOLIC BLOOD PRESSURE: 71 MMHG | OXYGEN SATURATION: 95 % | TEMPERATURE: 98 F | WEIGHT: 145 LBS | RESPIRATION RATE: 16 BRPM | BODY MASS INDEX: 25.69 KG/M2 | HEART RATE: 86 BPM | HEIGHT: 63 IN

## 2019-09-13 PROBLEM — I63.9 ACUTE CVA (CEREBROVASCULAR ACCIDENT) (HCC): Status: ACTIVE | Noted: 2019-09-13

## 2019-09-13 LAB
ANION GAP SERPL CALCULATED.3IONS-SCNC: 12 MMOL/L (ref 3–16)
BASOPHILS ABSOLUTE: 0.1 K/UL (ref 0–0.2)
BASOPHILS RELATIVE PERCENT: 1.1 %
BUN BLDV-MCNC: 17 MG/DL (ref 7–20)
CALCIUM SERPL-MCNC: 8.8 MG/DL (ref 8.3–10.6)
CHLORIDE BLD-SCNC: 106 MMOL/L (ref 99–110)
CO2: 24 MMOL/L (ref 21–32)
CREAT SERPL-MCNC: 0.7 MG/DL (ref 0.6–1.2)
EOSINOPHILS ABSOLUTE: 0.2 K/UL (ref 0–0.6)
EOSINOPHILS RELATIVE PERCENT: 2.6 %
GFR AFRICAN AMERICAN: >60
GFR NON-AFRICAN AMERICAN: >60
GLUCOSE BLD-MCNC: 119 MG/DL (ref 70–99)
HCT VFR BLD CALC: 41.1 % (ref 36–48)
HEMOGLOBIN: 14.2 G/DL (ref 12–16)
LYMPHOCYTES ABSOLUTE: 1.9 K/UL (ref 1–5.1)
LYMPHOCYTES RELATIVE PERCENT: 20.5 %
MCH RBC QN AUTO: 31.9 PG (ref 26–34)
MCHC RBC AUTO-ENTMCNC: 34.5 G/DL (ref 31–36)
MCV RBC AUTO: 92.4 FL (ref 80–100)
MONOCYTES ABSOLUTE: 1 K/UL (ref 0–1.3)
MONOCYTES RELATIVE PERCENT: 10.6 %
NEUTROPHILS ABSOLUTE: 6 K/UL (ref 1.7–7.7)
NEUTROPHILS RELATIVE PERCENT: 65.2 %
PDW BLD-RTO: 12.9 % (ref 12.4–15.4)
PLATELET # BLD: 276 K/UL (ref 135–450)
PMV BLD AUTO: 7.5 FL (ref 5–10.5)
POTASSIUM REFLEX MAGNESIUM: 3.7 MMOL/L (ref 3.5–5.1)
RBC # BLD: 4.45 M/UL (ref 4–5.2)
SODIUM BLD-SCNC: 142 MMOL/L (ref 136–145)
WBC # BLD: 9.2 K/UL (ref 4–11)

## 2019-09-13 PROCEDURE — 6360000002 HC RX W HCPCS

## 2019-09-13 PROCEDURE — 85025 COMPLETE CBC W/AUTO DIFF WBC: CPT

## 2019-09-13 PROCEDURE — 36415 COLL VENOUS BLD VENIPUNCTURE: CPT

## 2019-09-13 PROCEDURE — 80048 BASIC METABOLIC PNL TOTAL CA: CPT

## 2019-09-13 PROCEDURE — 2580000003 HC RX 258

## 2019-09-13 PROCEDURE — 6370000000 HC RX 637 (ALT 250 FOR IP): Performed by: PHYSICAL MEDICINE & REHABILITATION

## 2019-09-13 PROCEDURE — 6370000000 HC RX 637 (ALT 250 FOR IP): Performed by: STUDENT IN AN ORGANIZED HEALTH CARE EDUCATION/TRAINING PROGRAM

## 2019-09-13 PROCEDURE — 1280000000 HC REHAB R&B

## 2019-09-13 PROCEDURE — 99238 HOSP IP/OBS DSCHRG MGMT 30/<: CPT | Performed by: FAMILY MEDICINE

## 2019-09-13 PROCEDURE — 6370000000 HC RX 637 (ALT 250 FOR IP)

## 2019-09-13 RX ORDER — PANTOPRAZOLE SODIUM 40 MG/1
40 TABLET, DELAYED RELEASE ORAL
Status: CANCELLED | OUTPATIENT
Start: 2019-09-14

## 2019-09-13 RX ORDER — ACETAMINOPHEN 325 MG/1
650 TABLET ORAL EVERY 4 HOURS PRN
Status: CANCELLED | OUTPATIENT
Start: 2019-09-13

## 2019-09-13 RX ORDER — DOCUSATE SODIUM 100 MG/1
100 CAPSULE, LIQUID FILLED ORAL 2 TIMES DAILY
Status: DISCONTINUED | OUTPATIENT
Start: 2019-09-13 | End: 2019-09-25 | Stop reason: HOSPADM

## 2019-09-13 RX ORDER — ASPIRIN 81 MG/1
81 TABLET, CHEWABLE ORAL DAILY
Status: CANCELLED | OUTPATIENT
Start: 2019-09-14

## 2019-09-13 RX ORDER — ATORVASTATIN CALCIUM 40 MG/1
40 TABLET, FILM COATED ORAL NIGHTLY
Status: CANCELLED | OUTPATIENT
Start: 2019-09-13

## 2019-09-13 RX ORDER — CLOPIDOGREL BISULFATE 75 MG/1
75 TABLET ORAL DAILY
Qty: 30 TABLET | Refills: 3 | Status: ON HOLD | OUTPATIENT
Start: 2019-09-14 | End: 2019-09-13

## 2019-09-13 RX ORDER — AMLODIPINE BESYLATE 5 MG/1
5 TABLET ORAL DAILY
Status: DISCONTINUED | OUTPATIENT
Start: 2019-09-14 | End: 2019-09-25 | Stop reason: HOSPADM

## 2019-09-13 RX ORDER — ATORVASTATIN CALCIUM 40 MG/1
40 TABLET, FILM COATED ORAL NIGHTLY
Status: DISCONTINUED | OUTPATIENT
Start: 2019-09-13 | End: 2019-09-25 | Stop reason: HOSPADM

## 2019-09-13 RX ORDER — DOCUSATE SODIUM 100 MG/1
100 CAPSULE, LIQUID FILLED ORAL 2 TIMES DAILY
Status: CANCELLED | OUTPATIENT
Start: 2019-09-13

## 2019-09-13 RX ORDER — ATORVASTATIN CALCIUM 40 MG/1
40 TABLET, FILM COATED ORAL NIGHTLY
Qty: 30 TABLET | Refills: 3 | Status: ON HOLD | OUTPATIENT
Start: 2019-09-13 | End: 2019-09-13

## 2019-09-13 RX ORDER — POLYETHYLENE GLYCOL 3350 17 G/17G
17 POWDER, FOR SOLUTION ORAL DAILY PRN
Status: DISCONTINUED | OUTPATIENT
Start: 2019-09-13 | End: 2019-09-25 | Stop reason: HOSPADM

## 2019-09-13 RX ORDER — AMLODIPINE BESYLATE 5 MG/1
5 TABLET ORAL DAILY
Qty: 30 TABLET | Refills: 3 | Status: SHIPPED | OUTPATIENT
Start: 2019-09-14 | End: 2020-02-24 | Stop reason: SDUPTHER

## 2019-09-13 RX ORDER — ASPIRIN 81 MG/1
81 TABLET, CHEWABLE ORAL DAILY
Status: DISCONTINUED | OUTPATIENT
Start: 2019-09-14 | End: 2019-09-25 | Stop reason: HOSPADM

## 2019-09-13 RX ORDER — POLYETHYLENE GLYCOL 3350 17 G/17G
17 POWDER, FOR SOLUTION ORAL DAILY PRN
Status: CANCELLED | OUTPATIENT
Start: 2019-09-13

## 2019-09-13 RX ORDER — CLOPIDOGREL BISULFATE 75 MG/1
75 TABLET ORAL DAILY
Status: DISCONTINUED | OUTPATIENT
Start: 2019-09-14 | End: 2019-09-25 | Stop reason: HOSPADM

## 2019-09-13 RX ORDER — CLOPIDOGREL BISULFATE 75 MG/1
75 TABLET ORAL DAILY
Status: CANCELLED | OUTPATIENT
Start: 2019-09-14 | End: 2019-12-07

## 2019-09-13 RX ORDER — PANTOPRAZOLE SODIUM 40 MG/1
40 TABLET, DELAYED RELEASE ORAL
Status: DISCONTINUED | OUTPATIENT
Start: 2019-09-14 | End: 2019-09-25 | Stop reason: HOSPADM

## 2019-09-13 RX ORDER — ACETAMINOPHEN 325 MG/1
650 TABLET ORAL EVERY 4 HOURS PRN
Status: DISCONTINUED | OUTPATIENT
Start: 2019-09-13 | End: 2019-09-25 | Stop reason: HOSPADM

## 2019-09-13 RX ORDER — AMLODIPINE BESYLATE 5 MG/1
5 TABLET ORAL DAILY
Status: CANCELLED | OUTPATIENT
Start: 2019-09-14

## 2019-09-13 RX ADMIN — ATORVASTATIN CALCIUM 40 MG: 40 TABLET, FILM COATED ORAL at 21:54

## 2019-09-13 RX ADMIN — PANTOPRAZOLE SODIUM 40 MG: 40 TABLET, DELAYED RELEASE ORAL at 06:01

## 2019-09-13 RX ADMIN — CLOPIDOGREL BISULFATE 75 MG: 75 TABLET ORAL at 09:10

## 2019-09-13 RX ADMIN — AMLODIPINE BESYLATE 5 MG: 5 TABLET ORAL at 09:10

## 2019-09-13 RX ADMIN — Medication 10 ML: at 09:14

## 2019-09-13 RX ADMIN — SERTRALINE HYDROCHLORIDE 50 MG: 50 TABLET ORAL at 09:10

## 2019-09-13 RX ADMIN — ENOXAPARIN SODIUM 40 MG: 40 INJECTION SUBCUTANEOUS at 09:10

## 2019-09-13 RX ADMIN — POLYETHYLENE GLYCOL 3350 17 G: 17 POWDER, FOR SOLUTION ORAL at 09:10

## 2019-09-13 RX ADMIN — ASPIRIN 81 MG 81 MG: 81 TABLET ORAL at 09:10

## 2019-09-13 ASSESSMENT — PAIN SCALES - GENERAL
PAINLEVEL_OUTOF10: 0

## 2019-09-13 NOTE — CARE COORDINATION
Case Management Assessment           Daily Note                 Date/ Time of Note: 9/12/2019 3:45 PM         Note completed by: Perez Dillon    Patient Name: Pillo Massey  YOB: 1945    Diagnosis:Cerebrovascular accident (CVA) determined by clinical assessment [facility]) [I63.9]  Cerebrovascular accident (CVA) determined by clinical assessment [facility]) [I63.9]  Patient Admission Status: Inpatient    Date of Admission:9/6/2019  6:37 PM Length of Stay: 6 GLOS:        Current Plan of Care: awaiting bed on ARU  ________________________________________________________________________________________  PT AM-PAC: 17 / 24 per last evaluation on: today    OT AM-PAC: 17 / 24 per last evaluation on: today    DME Needs for discharge:     ________________________________________________________________________________________  Discharge Plan: Inpatient Rehab: [facility]    Tentative discharge date: 9/13/19    Current barriers to discharge: bed available on ARU    Referrals completed:     Resources/ information provided:   ________________________________________________________________________________________  Case Management Notes:  SW following Pt today and spoke with Concha Hennessy in ARU - no beds available today but will have 3 beds available in AM so Pt [facility]'s [facility] auth. Pt and spouse do not want to pursue another [facility] ([facility] they want to go to [address] in AM. SW/CM will follow up in Emma Tellez and her family were provided with choice of provider; she and her family are in agreement with the discharge plan.     Care Transition Patient: Belem Dillon [facility] [employer]  Case Management Department  Ph: [phone]
Pt has been accepted by Dr. Amador Alejandra to [facility] but we are still waiting for authorization from insurance. Received a call from pt's daughter asking about discharge plans and explained the process to her.      Electronically signed by Can Davis RN on 9/10/2019 at 5:10 PM  [phone]
assistance Purchasing Medications: Potential Assistance Purchasing Medications: No  Does Patient want to participate in local refill/ meds to beds program?: No    Meds To Beds General Rules:  1. Can ONLY be done Monday- Friday between 8:30am-5pm  2. Prescription(s) must be in pharmacy by 3pm to be filled same day  3. Copy of patient's insurance/ prescription drug card and patient face sheet must be sent along with the prescription(s)  4. Cost of Rx cannot be added to hospital bill. If financial assistance is needed, please contact unit  or ;  or  CANNOT provide pharmacy voucher for patients co-pays  5. Patients can then  the prescription on their way out of the hospital at discharge, or pharmacy can deliver to the bedside if staff is available. (payment due at time of pick-up or delivery - cash, check, or card accepted)     Able to afford home medications/ co-pay costs: Yes    ADLS  Support Systems: Spouse/Significant Other, Family Members    PT AM-PAC: 17 /24  OT AM-PAC: 17 /24    [place]: one level house  Steps: 1-2    Plans to RETURN to current housing: No  Barriers to RETURNING to current housing: will need precert    DISCHARGE PLAN:  Disposition: [facility]    Transportation PLAN for discharge: TBD     Factors facilitating achievement of predicted outcomes: Family support, Motivated, Cooperative and Pleasant    Barriers to discharge: will need precert    Additional Case Management Notes: We also discussed have a secondary plan in place just in case her insurance company does not give approval. We talked about a few places and she was going to think about this and discuss it more with her family. Halley Jean and her family were provided with choice of provider; she and her family are in agreement with the discharge plan.     Care Transition patient: Belem Mi RN  [facility] [employer] [facility]  Case Management Department  Ph: [phone]
home medications/ co-pay costs: Yes    ADLS:  Current PT AM-PAC Score: 17 /24  Current OT AM-PAC Score: 17 /24      DISCHARGE Disposition: Inpatient Rehab: [facility] Phone: [phone] Fax: addis    LOC at discharge: Not Applicable  MALIK Completed: Yes    Notification completed in HENS/PAS?:  Not Applicable    IMM Completed:   Not Indicated      Additional CM Notes: ADDIS Mc and her family were provided with choice of provider; she and her family are in agreement with the discharge plan.     Care Transitions patient: No Aram Goodpasture, RN  [facility] [employer] [employer]  Case Management Department  Ph: [phone]  Fax: [phone]

## 2019-09-13 NOTE — PROGRESS NOTES
C6-7  - Neurosurgery consulted, appreciate recs     GERD  - Protonix qd     HTN  - permissive HTN was allowed for 24 hours  - BP currently well controlled     Breast cancer - Dx 10/2017 stage IA (Left breast central portion, T1b, N0, M0), ER+,KY+, HER-2/dora neg invasive ductal carcinoma, s/p XRT, chemo with anastrozole, and partial mastectomy     Depression   - continue sertraline 50 mg qd      Impairments- decreased function- decreased ADLs    Recommendations:  ARU admit TODAY    Patient with new functional deficits and ongoing medical complexity. Demonstrates ability to tolerate 3 hours therapy/day. She is a good candidate for acute inpatient rehab when medically appropriate. Thank you for this consult. Please contact me with any questions or concerns.      Raj Goss D.O. M.P.H  PM&R  9/13/2019  10:53 AM

## 2019-09-13 NOTE — DISCHARGE INSTR - COC
UE:83145}       Date of Last BM: ***    Intake/Output Summary (Last 24 hours) at 2019 0954  Last data filed at 2019 0917  Gross per 24 hour   Intake 1030 ml   Output --   Net 1030 ml     I/O last 3 completed shifts:   In: 3928 [P.O.:1080; I.V.:10]  Out: -     Safety Concerns:     [employer] Safety Concerns:546273609}    Impairments/Disabilities:      [employer] Impairments/Disabilities:117264185}    Nutrition Therapy:  Current Nutrition Therapy:   [employer] Diet List:829798417}    Routes of Feeding: {CHP DME Other Feedings:173311650}  Liquids: {Slp liquid thickness:18366}  Daily Fluid Restriction: {CHP DME Yes amt example:909823571}  Last Modified Barium Swallow with Video (Video Swallowing Test): {Done Not Done QTGJ:771522468}    Treatments at the Time of Hospital Discharge:   Respiratory Treatments: ***  Oxygen Therapy:  {Therapy; copd oxygen:98134}  Ventilator:    { CC Vent QFVL:308135524}    Rehab Therapies: {THERAPEUTIC INTERVENTION:3796522875}  Weight Bearing Status/Restrictions: [address] [employer]  Weight Bearin}  Other Medical Equipment (for information only, NOT a DME order):  {EQUIPMENT:746141942}  Other Treatments: ***    Patient's personal belongings (please select all that are sent with patient):  {P DME Belongings:391992112}    RN SIGNATURE:  {Esignature:946698136}    CASE MANAGEMENT/SOCIAL WORK SECTION    Inpatient Status Date: 2019    Readmission Risk Assessment Score:  Readmission Risk              Risk of Unplanned Readmission:        14           Discharging to Facility/ Agency   · Name: [facility]  · Address:  · Phone:[phone]  · Fax:    ·     / signature: Electronically signed by Jarod Etienne RN on 19 at 5:15 PM    PHYSICIAN SECTION    Prognosis: Good    Condition at Discharge: Stable    Rehab Potential (if transferring to Rehab): Good    Recommended Labs or Other Treatments After Discharge: ***    Physician Certification: I certify the above information and transfer of Odette Reyes  is necessary for the continuing treatment of the diagnosis listed and that she requires Acute Rehab for {GREATER/LESS:082184533} 30 days.      Update Admission H&P: {CHP DME Changes in TLBPM:569971841}    PHYSICIAN SIGNATURE:  {Esignature:011278853}

## 2019-09-14 PROCEDURE — 97535 SELF CARE MNGMENT TRAINING: CPT

## 2019-09-14 PROCEDURE — 97162 PT EVAL MOD COMPLEX 30 MIN: CPT

## 2019-09-14 PROCEDURE — 97165 OT EVAL LOW COMPLEX 30 MIN: CPT

## 2019-09-14 PROCEDURE — 92610 EVALUATE SWALLOWING FUNCTION: CPT

## 2019-09-14 PROCEDURE — 6370000000 HC RX 637 (ALT 250 FOR IP): Performed by: PHYSICAL MEDICINE & REHABILITATION

## 2019-09-14 PROCEDURE — 6360000002 HC RX W HCPCS: Performed by: PHYSICAL MEDICINE & REHABILITATION

## 2019-09-14 PROCEDURE — 1280000000 HC REHAB R&B

## 2019-09-14 PROCEDURE — 97116 GAIT TRAINING THERAPY: CPT

## 2019-09-14 PROCEDURE — 92526 ORAL FUNCTION THERAPY: CPT

## 2019-09-14 PROCEDURE — 92523 SPEECH SOUND LANG COMPREHEN: CPT

## 2019-09-14 RX ADMIN — Medication 1 PACKET: at 08:37

## 2019-09-14 RX ADMIN — AMLODIPINE BESYLATE 5 MG: 5 TABLET ORAL at 08:38

## 2019-09-14 RX ADMIN — PANTOPRAZOLE SODIUM 40 MG: 40 TABLET, DELAYED RELEASE ORAL at 06:06

## 2019-09-14 RX ADMIN — CLOPIDOGREL BISULFATE 75 MG: 75 TABLET, FILM COATED ORAL at 08:38

## 2019-09-14 RX ADMIN — ATORVASTATIN CALCIUM 40 MG: 40 TABLET, FILM COATED ORAL at 22:46

## 2019-09-14 RX ADMIN — DOCUSATE SODIUM 100 MG: 100 CAPSULE, LIQUID FILLED ORAL at 22:46

## 2019-09-14 RX ADMIN — ENOXAPARIN SODIUM 40 MG: 40 INJECTION SUBCUTANEOUS at 08:38

## 2019-09-14 RX ADMIN — DOCUSATE SODIUM 100 MG: 100 CAPSULE, LIQUID FILLED ORAL at 08:38

## 2019-09-14 RX ADMIN — ASPIRIN 81 MG 81 MG: 81 TABLET ORAL at 08:38

## 2019-09-14 RX ADMIN — SERTRALINE HYDROCHLORIDE 50 MG: 50 TABLET ORAL at 08:38

## 2019-09-14 RX ADMIN — BISACODYL 5 MG: 5 TABLET, COATED ORAL at 08:38

## 2019-09-14 ASSESSMENT — PAIN DESCRIPTION - PROGRESSION: CLINICAL_PROGRESSION: NOT CHANGED

## 2019-09-14 ASSESSMENT — PAIN SCALES - GENERAL
PAINLEVEL_OUTOF10: 0
PAINLEVEL_OUTOF10: 0

## 2019-09-14 ASSESSMENT — 9 HOLE PEG TEST
TEST_RESULT: IMPAIRED
TESTTIME_SECONDS: 59.87

## 2019-09-14 NOTE — PROGRESS NOTES
Assessment complete, see flow sheet. Pt just worked with therapy and did well. Pt showered, Speech in to see. Pt has no new complaints, no new changes in neuro status. Call light in reach. Alarm on.   Leandro Reyna

## 2019-09-14 NOTE — PROGRESS NOTES
Physical Therapy    Facility/Department: [facility] ACUTE REHAB UNIT  Initial Assessment    NAME: Mary Rosales  : 1945  MRN: [id number]    Date of Service: 2019    Discharge Recommendations:  Continue to assess pending progress, Outpatient PT        Assessment   Body structures, Functions, Activity limitations: Decreased functional mobility ; Decreased strength;Decreased safe awareness;Decreased balance;Decreased endurance;Decreased cognition, visual perception impairments, neck pain  Assessment: Pt is a pleasant A+Ox 3 65 y/o with deficits per above following thalamic stroke, pt is significantly below her normal functional baseline, she is at baseline indep with community ambulation without fall history, 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 Pt at this time require assist with bed mobility, transfers, ambulation and stairs. Pt will benefit from intensive rehab at ARU to regain her functional baseline following CVA  Treatment Diagnosis: balance problems gait difficulty 2/2 CVA   Decision Making: Medium Complexity  PT Education: PT Role;General Safety;Gait Training;Functional Mobility Training;Transfer Training;Goals;Weight-bearing Education;Plan of Care;Equipment;Disease Specific Education  Patient Education: Pt v.u, demo's delayed processing & will require continued edu   REQUIRES PT FOLLOW UP: Yes  Activity Tolerance  Activity Tolerance: Patient Tolerated treatment well  Activity Tolerance: pleasant and cooperative, needs reinforcement and education over deficits and safety        Patient Diagnosis(es): There were no encounter diagnoses. ADMISSION DATE: 2019  ADMITTING DIAGNOSIS: has GERD (gastroesophageal reflux disease); Vernon esophagus; Balance disorder; Osteoporosis; Lumbar radiculopathy; Colon polyp; Depression; Malignant neoplasm of upper-outer quadrant of left female breast ([facility] [address]  Acquired contour deformity of breast; Cerebrovascular accident (CVA) due to Positioning, Stair training, ROM, IADL Training, Home Exercise Program, Neuromuscular Re-education, Pain Management, Manual Therapy - Soft Tissue Mobilization  Safety Devices  Type of devices: Call light within reach, Chair alarm in place, Patient at risk for falls, Bed alarm in place, All fall risk precautions in place, Gait belt    G-Code       OutComes Score                                                  AM-PAC Score             Goals  Short term goals  Time Frame for Short term goals: 2 weeks   Short term goal 1: Pt to improve bed mobility skills to baseline by performing indep   Short term goal 2: Pt to Improve ambulation lilliam and indep by household distance indep   Short term goal 3: Pt to manage flight of stairs with SBA  Short term goal 4: Pt to ambulate all surfaces with SBA with least restrictive assistive device  Short term goal 5: Pt to improve sitting balance to normal for ease of self care  Short term goal 6: Pt to improve standing balance to fair plus to good for ease and safety of transfers   Long term goals  Time Frame for Long term goals : 4 weeks  Long term goal 1: Pt to return to functional baseline performing all functional transfers, community distance ( >/=1000ft) all surfaces  Long term goal 2: Pt to improve standing balance to normal needed for indep gait and transfers evident with indep floor/stand transfers and reaching activities  Long term goal 3: Pt to perform stair mobility at modified indep needed for home environment   Long term goal 4: Pt to perform modified indep car transfer   Patient Goals   Patient goals :  To go home to be my husbands caregiver        Therapy Time   Individual Concurrent Group Co-treatment   Time In 0940         Time Out 1040(Eval, 2 gait )         Minutes 60         Timed Code Treatment Minutes: 76 [facility], PT DPT

## 2019-09-14 NOTE — H&P
ALLERGIC/IMMUNOLOGIC: negative for recurrent infections, angioedema, anaphylaxis and drug reactions. ENDOCRINE: negative for weight changes and diabetic symptoms including polyuria, polydipsia and polyphagia. MUSCULOSKELETAL: negative for pain, joint swelling, decreased range of motion and muscle weakness. NEUROLOGICAL: negative for headaches, slurred speech, unilateral weakness. PSYCHIATRIC/BEHAVIORAL: negative for hallucinations, behavioral problems, confusion and agitation. All pertinent positives are noted in the HPI. Physical Examination:  Vitals:   Patient Vitals for the past 24 hrs:   BP Temp Temp src Pulse Resp SpO2 Height Weight   09/14/19 0835 (!) 146/77 98.3 °F (36.8 °C) Oral 89 16 94 % -- --   09/13/19 2146 119/64 98.9 °F (37.2 °C) Oral 84 16 95 % -- --   09/13/19 1718 -- -- -- -- -- -- 5' 2.99\" (1.6 m) 145 lb (65.8 kg)   09/13/19 1646 129/77 98.2 °F (36.8 °C) Oral 78 16 96 % -- --     Psych: Stable mood, normal judgement, normal affect   Const: No distress  Eyes: Conjunctiva noninjected, no icterus noted; pupils equal, round, and reactive to light. HENT: Atraumatic, normocephalic; Oral mucosa moist  Neck: Trachea midline, neck supple. No thyromegaly noted. CV: Regular rate and rhythm, no murmur rub or gallop noted  Resp: Lungs clear to auscultation bilaterally, no rales wheezes or ronchi, no retractions. Respirations unlabored. GI: Soft, nontender, nondistended. Normal bowel sounds. No palpable masses. Neuro: Alert, oriented, appropriate. No cranial nerve deficits appreciated. Sensation  To light touch diminished left upper and lower limbs. 4/5 strength left upper and lower limbs. Skin: Normal temperature and turgor  MSK: No joint abnormalities noted. Ext: No significant edema appreciated. No varicosities.     Lab Results   Component Value Date    WBC 9.2 09/13/2019    HGB 14.2 09/13/2019    HCT 41.1 09/13/2019    MCV 92.4 09/13/2019     09/13/2019     Lab Results Component Value Date    INR 0.96 09/06/2019    PROTIME 11.0 09/06/2019     Lab Results   Component Value Date    CREATININE 0.7 09/13/2019    BUN 17 09/13/2019     09/13/2019    K 3.7 09/13/2019     09/13/2019    CO2 24 09/13/2019     Lab Results   Component Value Date    ALT 17 09/06/2019    AST 18 09/06/2019    ALKPHOS 63 09/06/2019    BILITOT 0.4 09/06/2019     CT HEAD WO CONTRAST   Final Result   No change from prior study. No acute infarct noted. No    evidence for hemorrhage or hematoma noted       MRI CERVICAL SPINE WO CONTRAST   Final Result       1. Mild spinal canal stenosis and moderate to severe bilateral neural foraminal stenoses at C5-6 and C6-7.       MRI BRAIN WO CONTRAST   Final Result       1. Acute lacunar infarct involving the lateral aspect of the right thalamus and posterior limb of the right internal capsule. 2. Chronic small left occipital lobe infarct. 3. Mild cerebral atrophy. 4. Mild chronic small vessel ischemic disease.       XR CHEST PORTABLE   Final Result       No acute pulmonary disease.           CTA HEAD W CONTRAST   Final Result       1. Occlusion versus severe stenosis of the right posterior cerebral artery P2 segment.       2. Areas of severe narrowing of the left anterior cerebral artery A2 segment.       3. No significant stenosis in the extracranial vertebral or carotid arteries.       4. Multilevel neural foraminal narrowing in the cervical spine as described above.       Findings were discussed with Dr. Kvng Watt by Dr. Brigida Hung on 9/6/2019 at 7:13 PM.       CTA NECK W CONTRAST   Final Result       1. Occlusion versus severe stenosis of the right posterior cerebral artery P2 segment.       2. Areas of severe narrowing of the left anterior cerebral artery A2 segment.       3.  No significant stenosis in the extracranial vertebral or carotid arteries.       4. Multilevel neural foraminal narrowing in the cervical spine as described above.       Findings were

## 2019-09-14 NOTE — PROGRESS NOTES
Occupational Therapy   Occupational Therapy Initial Assessment  Date: 2019   Patient Name: Russ Marquez  MRN: [id number]     : 1945    Date of Service: 2019    Discharge Recommendations:  24 hour supervision or assist, Outpatient OT, Continue to assess pending progress  OT Equipment Recommendations  Other: continue to assess--pt's  has a shower chair, toilet raiser, grab bars in shower. Assessment   Performance deficits / Impairments: Decreased functional mobility ; Decreased ADL status; Decreased balance;Decreased cognition;Decreased safe awareness;Decreased endurance;Decreased strength;Decreased fine motor control;Decreased coordination;Decreased high-level IADLs;Decreased vision/visual deficit; Decreased sensation  Assessment: Pt is a 67 yo female who presents to [facility] 2/2 CVA. Prior to admit pt was independent w/ ADLs and IADLs and was a primary caregiver for her . Currently, pt is very limited by decreased sensation and coordination in LUE as well as impaired balance. Pt also has some visual perception deficits as well as L sided inattention. Pt benefits from OT in order to maximize functional independence. Treatment Diagnosis: impaired ADLs, mobility, L strength and motor control s/p CVA  Prognosis: Good  Decision Making: Low Complexity  OT Education: OT Role;Plan of Care;Transfer Training;Orientation  REQUIRES OT FOLLOW UP: Yes  Activity Tolerance  Activity Tolerance: Patient Tolerated treatment well  Safety Devices  Safety Devices in place: Yes  Type of devices: Call light within reach; Chair alarm in place; Left in chair;Nurse notified           Patient Diagnosis(es): There were no encounter diagnoses.      has a past medical history of Vernon's esophagus, Cancer ([address] Cerebrovascular accident (CVA) due to embolism of posterior cerebral artery with infarctions of both occipital lobes ([address] Colon polyp, Gallstones, GERD (gastroesophageal reflux disease), Hypertension, Kidney

## 2019-09-15 PROCEDURE — 6370000000 HC RX 637 (ALT 250 FOR IP): Performed by: PHYSICAL MEDICINE & REHABILITATION

## 2019-09-15 PROCEDURE — 1280000000 HC REHAB R&B

## 2019-09-15 PROCEDURE — 6360000002 HC RX W HCPCS: Performed by: PHYSICAL MEDICINE & REHABILITATION

## 2019-09-15 RX ADMIN — CLOPIDOGREL BISULFATE 75 MG: 75 TABLET, FILM COATED ORAL at 08:13

## 2019-09-15 RX ADMIN — PANTOPRAZOLE SODIUM 40 MG: 40 TABLET, DELAYED RELEASE ORAL at 10:05

## 2019-09-15 RX ADMIN — ASPIRIN 81 MG 81 MG: 81 TABLET ORAL at 08:13

## 2019-09-15 RX ADMIN — DOCUSATE SODIUM 100 MG: 100 CAPSULE, LIQUID FILLED ORAL at 08:13

## 2019-09-15 RX ADMIN — SERTRALINE HYDROCHLORIDE 50 MG: 50 TABLET ORAL at 08:13

## 2019-09-15 RX ADMIN — ENOXAPARIN SODIUM 40 MG: 40 INJECTION SUBCUTANEOUS at 08:13

## 2019-09-15 RX ADMIN — AMLODIPINE BESYLATE 5 MG: 5 TABLET ORAL at 08:13

## 2019-09-15 RX ADMIN — BISACODYL 5 MG: 5 TABLET, COATED ORAL at 08:13

## 2019-09-15 RX ADMIN — ATORVASTATIN CALCIUM 40 MG: 40 TABLET, FILM COATED ORAL at 22:03

## 2019-09-15 ASSESSMENT — PAIN SCALES - GENERAL
PAINLEVEL_OUTOF10: 0
PAINLEVEL_OUTOF10: 0

## 2019-09-15 NOTE — PROGRESS NOTES
Patient currently setting in chair at bedside. Alert and oriented x 4. Calm and cooperative with care. Bed alarm on and fall precautions taken. Nurse will continue to monitor patient. Call light within reach.  Milan Yao

## 2019-09-15 NOTE — PROGRESS NOTES
Angela Bernabe  9/15/2019  [id number]    Chief Complaint: Acute CVA (cerebrovascular accident) [facility])    Subjective:   Resting comfortably this AM. Wakes to voice. No c/o. ROS: No n/v, cp, sob, f/c  Objective:  Patient Vitals for the past 24 hrs:   BP Temp Temp src Pulse Resp SpO2   09/14/19 2245 (!) 146/71 98.2 °F (36.8 °C) Oral 80 18 93 %     Gen: No distress, pleasant. HEENT: Normocephalic, atraumatic. CV: Regular rate and rhythm. Resp: No respiratory distress. Abd: Soft, nontender   Ext: No edema. Neuro: Alert, oriented, appropriately interactive. Wt Readings from Last 3 Encounters:   09/13/19 145 lb (65.8 kg)   09/06/19 145 lb (65.8 kg)   09/05/19 145 lb (65.8 kg)       Laboratory data:   Lab Results   Component Value Date    WBC 9.2 09/13/2019    HGB 14.2 09/13/2019    HCT 41.1 09/13/2019    MCV 92.4 09/13/2019     09/13/2019       Lab Results   Component Value Date     09/13/2019    K 3.7 09/13/2019     09/13/2019    CO2 24 09/13/2019    BUN 17 09/13/2019    CREATININE 0.7 09/13/2019    GLUCOSE 119 09/13/2019    CALCIUM 8.8 09/13/2019        Therapy progress:  PT  Position Activity Restriction  Other position/activity restrictions: up with assist  Objective     Sit to Stand: Minimal Assistance(leans posteriorly takes several seconds to gain balance, intiially upon standing but can self correct without cues )  Stand to sit: Minimal Assistance(slowed balance reactions noted with all transfers throughout session)  Bed to Chair: Minimal assistance  Assistance: Minimal assistance  Distance: 50ft x 3   OT     Toilet - Technique: Ambulating  Equipment Used: Standard toilet  Toilet Transfers Comments: VCs to align self in middle of toilet, VCs for controlled descent   Assessment        SLP  Current Diet : Regular  Current Liquid Diet : Thin  Diet Solids Recommendation: Regular  Liquid Consistency Recommendation: Thin    Body mass index is 25.69 kg/m².     Rehabilitation

## 2019-09-16 LAB
ANION GAP SERPL CALCULATED.3IONS-SCNC: 13 MMOL/L (ref 3–16)
BASOPHILS ABSOLUTE: 0.1 K/UL (ref 0–0.2)
BASOPHILS RELATIVE PERCENT: 1 %
BUN BLDV-MCNC: 16 MG/DL (ref 7–20)
CALCIUM SERPL-MCNC: 8.8 MG/DL (ref 8.3–10.6)
CHLORIDE BLD-SCNC: 105 MMOL/L (ref 99–110)
CO2: 26 MMOL/L (ref 21–32)
CREAT SERPL-MCNC: 0.7 MG/DL (ref 0.6–1.2)
EOSINOPHILS ABSOLUTE: 0.2 K/UL (ref 0–0.6)
EOSINOPHILS RELATIVE PERCENT: 1.9 %
GFR AFRICAN AMERICAN: >60
GFR NON-AFRICAN AMERICAN: >60
GLUCOSE BLD-MCNC: 110 MG/DL (ref 70–99)
HCT VFR BLD CALC: 39.7 % (ref 36–48)
HEMOGLOBIN: 13.4 G/DL (ref 12–16)
LYMPHOCYTES ABSOLUTE: 2 K/UL (ref 1–5.1)
LYMPHOCYTES RELATIVE PERCENT: 20.2 %
MCH RBC QN AUTO: 31.1 PG (ref 26–34)
MCHC RBC AUTO-ENTMCNC: 33.8 G/DL (ref 31–36)
MCV RBC AUTO: 92 FL (ref 80–100)
MONOCYTES ABSOLUTE: 0.9 K/UL (ref 0–1.3)
MONOCYTES RELATIVE PERCENT: 9.2 %
NEUTROPHILS ABSOLUTE: 6.5 K/UL (ref 1.7–7.7)
NEUTROPHILS RELATIVE PERCENT: 67.7 %
PDW BLD-RTO: 12.6 % (ref 12.4–15.4)
PLATELET # BLD: 286 K/UL (ref 135–450)
PMV BLD AUTO: 7.9 FL (ref 5–10.5)
POTASSIUM REFLEX MAGNESIUM: 3.6 MMOL/L (ref 3.5–5.1)
RBC # BLD: 4.32 M/UL (ref 4–5.2)
SODIUM BLD-SCNC: 144 MMOL/L (ref 136–145)
WBC # BLD: 9.7 K/UL (ref 4–11)

## 2019-09-16 PROCEDURE — 80048 BASIC METABOLIC PNL TOTAL CA: CPT

## 2019-09-16 PROCEDURE — 92507 TX SP LANG VOICE COMM INDIV: CPT | Performed by: SPEECH-LANGUAGE PATHOLOGIST

## 2019-09-16 PROCEDURE — 97127 HC SP THER IVNTJ W/FOCUS COG FUNCJ: CPT | Performed by: SPEECH-LANGUAGE PATHOLOGIST

## 2019-09-16 PROCEDURE — 1280000000 HC REHAB R&B

## 2019-09-16 PROCEDURE — 6370000000 HC RX 637 (ALT 250 FOR IP): Performed by: PHYSICAL MEDICINE & REHABILITATION

## 2019-09-16 PROCEDURE — 97116 GAIT TRAINING THERAPY: CPT

## 2019-09-16 PROCEDURE — 97530 THERAPEUTIC ACTIVITIES: CPT

## 2019-09-16 PROCEDURE — 85025 COMPLETE CBC W/AUTO DIFF WBC: CPT

## 2019-09-16 PROCEDURE — 97127 HC SP THER IVNTJ W/FOCUS COG FUNCJ: CPT

## 2019-09-16 PROCEDURE — 6360000002 HC RX W HCPCS: Performed by: PHYSICAL MEDICINE & REHABILITATION

## 2019-09-16 PROCEDURE — 97535 SELF CARE MNGMENT TRAINING: CPT

## 2019-09-16 PROCEDURE — 97110 THERAPEUTIC EXERCISES: CPT

## 2019-09-16 PROCEDURE — 36415 COLL VENOUS BLD VENIPUNCTURE: CPT

## 2019-09-16 RX ADMIN — ENOXAPARIN SODIUM 40 MG: 40 INJECTION SUBCUTANEOUS at 08:33

## 2019-09-16 RX ADMIN — PANTOPRAZOLE SODIUM 40 MG: 40 TABLET, DELAYED RELEASE ORAL at 06:56

## 2019-09-16 RX ADMIN — ASPIRIN 81 MG 81 MG: 81 TABLET ORAL at 08:33

## 2019-09-16 RX ADMIN — DOCUSATE SODIUM 100 MG: 100 CAPSULE, LIQUID FILLED ORAL at 08:33

## 2019-09-16 RX ADMIN — ATORVASTATIN CALCIUM 40 MG: 40 TABLET, FILM COATED ORAL at 20:22

## 2019-09-16 RX ADMIN — AMLODIPINE BESYLATE 5 MG: 5 TABLET ORAL at 08:33

## 2019-09-16 RX ADMIN — CLOPIDOGREL BISULFATE 75 MG: 75 TABLET, FILM COATED ORAL at 08:33

## 2019-09-16 RX ADMIN — SERTRALINE HYDROCHLORIDE 50 MG: 50 TABLET ORAL at 08:33

## 2019-09-16 ASSESSMENT — PAIN SCALES - GENERAL
PAINLEVEL_OUTOF10: 0
PAINLEVEL_OUTOF10: 0

## 2019-09-16 NOTE — PROGRESS NOTES
Physical Therapy  Facility/Department: [facility] ACUTE REHAB UNIT  Daily Treatment Note  NAME: Danii gAuero  : 1945  MRN: [id number]    Date of Service: 2019    Discharge Recommendations:  24 hour supervision or assist, Home with Home health PT   PT Equipment Recommendations  Equipment Needed: (cont to assess)    Assessment   Body structures, Functions, Activity limitations: Decreased functional mobility ; Decreased strength;Decreased safe awareness;Decreased balance;Decreased endurance;Decreased cognition  Assessment: Pt progressing with transfers, gait, & stairs. Pt is motivated & cooperative. Remains a high fall risk & is unsafe for D/C home. Recommend cont skilled PT on rehab unit to maximize independence. Treatment Diagnosis: balance problems gait difficulty 2/2 CVA   Patient Education: transfers, gait, stairs  REQUIRES PT FOLLOW UP: Yes  Activity Tolerance  Activity Tolerance: transfers, gait, stairs     Patient Diagnosis(es): There were no encounter diagnoses. has a past medical history of Vernon's esophagus, Cancer ([address] Cerebrovascular accident (CVA) due to embolism of posterior cerebral artery with infarctions of both occipital lobes ([address] Colon polyp, Gallstones, GERD (gastroesophageal reflux disease), Hypertension, Kidney stone, Osteoporoses, Pregnancies, Shingles, Tubular adenoma nos, and UTI (urinary tract infection). has a past surgical history that includes Cholecystectomy (); bladder repair (); Colonoscopy (,3/9/2017); Hysterectomy; Appendectomy; Upper gastrointestinal endoscopy (); and Breast surgery (Left, 10/26/2017). Restrictions  Position Activity Restriction  Other position/activity restrictions: up with assist  Subjective   General  Chart Reviewed: Yes  Additional Pertinent Hx: Pt is a 66 y.o. female adm  with CVA. Came to ED with possible stroke - L arm and leg weakness, had fall at home. Head CT: neg.   CTA head/neck:  Occlusion versus severe

## 2019-09-16 NOTE — PROGRESS NOTES
Nutrition Assessment    Type and Reason for Visit: Initial    Nutrition Recommendations:   1. PO Diet: Continue current General diet, least restrictive safest texture per Speech Therapy  2. Monitor need for ONS if po intake declines  3. Monitor weight, labs and clinical progress  4. Monitor, record and encourage adequate PO intake at all meals through admission. 5. Consume small/frequent meals and snacks to build appetite    Nutrition Assessment: RD following for new admit to ARU with stroke. Pt is nutritionally compromised AEB decreased appetite for 1 week per pt report, however seems to be eating well per I/O with increased po intake and weight has been stable. RD offered ONS to help build appetite. Pt declined ONS at this time. Will follow diet recs per SLP, continue to monitor intake adequacy and nutrition status. Malnutrition Assessment:  · Malnutrition Status: No malnutrition  · Context: Acute illness or injury  · Findings of the 6 clinical characteristics of malnutrition (Minimum of 2 out of 6 clinical characteristics is required to make the diagnosis of moderate or severe Protein Calorie Malnutrition based on AND/ASPEN Guidelines):  1. Energy Intake-(Greater than or equal to 50% of estimated energy requirement), Greater than or equal to 7 days    2. Weight Loss-No significant weight loss,    3. Fat Loss-No significant subcutaneous fat loss,    4. Muscle Loss-No significant muscle mass loss(likely some age related loss),    5. Fluid Accumulation-No significant fluid accumulation,    6.  Strength-Not measured    Nutrition Risk Level:  Moderate    Nutrient Needs:  · Estimated Daily Total Kcal: 1887-8997 (25-27)  · Estimated Daily Protein (g): 68-78 (1.3-1.5)  · Estimated Daily Total Fluid (ml/day): 4340-4145 (1 ml/kcal)    Nutrition Diagnosis:   · Problem: Inadequate oral intake  · Etiology: related to Insufficient energy/nutrient consumption     Signs and symptoms:  as evidenced by Patient report of, Intake 25-50%(decreased appetite)    Objective Information:  · Nutrition-Focused Physical Findings: weakness; no edema; last BM x 4 on 9/15  · Wound Type: None  · Current Nutrition Therapies:  · Oral Diet Orders: General   · Oral Diet intake: 1-25% x 1 meal, 26-50%, 51-75%, %  · Oral Nutrition Supplement (ONS) Orders: None  · Anthropometric Measures:  · Ht: 5' 2.99\" (160 cm)   · Current Body Wt: 152 lb (68.9 kg)  · Admission Body Wt: 145 lb (65.8 kg)  · Usual Body Wt: (145-150 lb per EMR)  · Ideal Body Wt: 115 lb (52.2 kg),   · BMI Classification: BMI 25.0 - 29.9 Overweight    Nutrition Interventions:   Continue current diet  Continued Inpatient Monitoring    Nutrition Evaluation:   · Evaluation: Goals set   · Goals: Pt will consume and tolerate at least 50% of meals offered    · Monitoring: Meal Intake, Diet Tolerance, Chewing/Swallowing, Weight, Pertinent Labs      Electronically signed by Hong Canela RD, LD on 9/16/19 at 10:25 AM    Contact Number: [phone]

## 2019-09-16 NOTE — PROGRESS NOTES
ACUTE REHAB UNIT  SPEECH/LANGUAGE PATHOLOGY      [x] Daily  [] Weekly Care Conference Note  [] Discharge    Patient:Amairani BUENO [address] [facility]  HSW:[id number]  Rehab Dx/Hx: Acute CVA (cerebrovascular accident) ([address] [I63.9]    Precautions: [] Aspiration  [x] Fall risk  [] Sternal  [] Seizure [] Hip  [] Weight Bearing [] Other  ST Dx: [] Aphasia  [] Dysarthria  [] Apraxia   [x] Oropharyngeal dysphagia [x] Cognitive Impairment  [] Other:   Date of Admit: 9/13/2019  Room #: 3101/3101-01  Date: 9/16/2019          Current Diet Order:DIET GENERAL;   Recommended Form of Meds: PO  Compensatory Swallowing Strategies: (General aspiration precautions only)     Dentition: Adequate  Vision  Vision: Impaired  Vision Exceptions: Wears glasses at all times(report some difficulty with vision that's new but not blurry)  Hearing  Hearing: Within functional limits  Barriers toward progress: Cognitive deficit        Date: 9/16/2019      Tx session 1 Tx session 2   Total Timed Code Min 15 30   Total Treatment Minutes 31 30   Individual Treatment Minutes 31 30   Group Treatment Minutes 0 0   Co-Treat Minutes 0 0   Brief Exception: NA N/A   Pain None indicated Denied   Pain Intervention: [] RN notified  [] Repositioned  [] Intervention offered and patient declined  [x] N/A  [] Other: [] RN notified  [] Repositioned  [] Intervention offered and patient declined  [x] N/A  [] Other:   Subjective:     Pt upright in chair finishing OT upon arrival; agreeable to tx. Tearful at beginning of session, reporting \"I just didn't know it would be this hard and I try to ignore these feelings\". Counseled that SLP was safe space to talk about emotions, but stated \"I'm done w/ my pity party now\". Pt up in chair upon entry, finishing with PT. Agreeable to tx and pleasant / cooperative throughout. No instances of tearfulness noted.  Pt demonstrating some insight into deficits, reporting \"normally I'm very good at math but feel like I wasn't getting it

## 2019-09-16 NOTE — PROGRESS NOTES
for Long term goals : 2 weeks  Long term goal 1: Pt will complete toileting and toilet transfers MOD I -ongoing, continue  Long term goal 2: Pt will complete self feeding w/ LUE I'ly -ongoing, continue  Long term goal 3: Pt will complete LE dressing MOD I -ongoing, continue  Long term goal 4: Pt will demonstrate increased functional use of LUE as evident by decreased speed of at least 10 secs on 9 hole peg test  -ongoing, continue  Long term goal 5: Pt will completed light IADL task of choice in stance for 10 mins MOD I -ongoing, continue  Patient Goals   Patient goals : \"be able to walk\"       Therapy Time   Individual Concurrent Group Co-treatment   Time In 0830         Time Out 0900         Minutes 30         Timed Code Treatment Minutes: 30 Minutes    Second Session Therapy Time:   Individual Concurrent Group Co-treatment   Time In 1130         Time Out 1200         Minutes 30           Timed Code Treatment Minutes:  30+30    Total Treatment Minutes:  [address] Ellis[address] [facility].  [address] [facility], OTR/L [id number]

## 2019-09-16 NOTE — PROGRESS NOTES
Pt. States no pain or discomfort. States only tingling to left side of face, arm and leg. Assist X1 with GB and walker. Voiding well. Will continue to monitor. Call-light within reach.

## 2019-09-17 PROCEDURE — 6360000002 HC RX W HCPCS: Performed by: PHYSICAL MEDICINE & REHABILITATION

## 2019-09-17 PROCEDURE — 97116 GAIT TRAINING THERAPY: CPT

## 2019-09-17 PROCEDURE — 97530 THERAPEUTIC ACTIVITIES: CPT

## 2019-09-17 PROCEDURE — 1280000000 HC REHAB R&B

## 2019-09-17 PROCEDURE — 6370000000 HC RX 637 (ALT 250 FOR IP): Performed by: PHYSICAL MEDICINE & REHABILITATION

## 2019-09-17 PROCEDURE — 97127 HC SP THER IVNTJ W/FOCUS COG FUNCJ: CPT

## 2019-09-17 PROCEDURE — 97127 HC SP THER IVNTJ W/FOCUS COG FUNCJ: CPT | Performed by: SPEECH-LANGUAGE PATHOLOGIST

## 2019-09-17 RX ADMIN — SERTRALINE HYDROCHLORIDE 50 MG: 50 TABLET ORAL at 08:14

## 2019-09-17 RX ADMIN — CLOPIDOGREL BISULFATE 75 MG: 75 TABLET, FILM COATED ORAL at 08:14

## 2019-09-17 RX ADMIN — ENOXAPARIN SODIUM 40 MG: 40 INJECTION SUBCUTANEOUS at 08:29

## 2019-09-17 RX ADMIN — DOCUSATE SODIUM 100 MG: 100 CAPSULE, LIQUID FILLED ORAL at 21:21

## 2019-09-17 RX ADMIN — ASPIRIN 81 MG 81 MG: 81 TABLET ORAL at 08:14

## 2019-09-17 RX ADMIN — PANTOPRAZOLE SODIUM 40 MG: 40 TABLET, DELAYED RELEASE ORAL at 08:14

## 2019-09-17 RX ADMIN — AMLODIPINE BESYLATE 5 MG: 5 TABLET ORAL at 08:14

## 2019-09-17 RX ADMIN — ATORVASTATIN CALCIUM 40 MG: 40 TABLET, FILM COATED ORAL at 21:21

## 2019-09-17 ASSESSMENT — PAIN SCALES - GENERAL
PAINLEVEL_OUTOF10: 0
PAINLEVEL_OUTOF10: 0

## 2019-09-17 NOTE — PROGRESS NOTES
stenosis of the right posterior cerebral artery P2 segment, Areas of severe narrowing of the left anterior cerebral artery A2 segment. CXR: neg. MRI brain:  Acute lacunar infarct involving the lateral aspect of the right thalamus and posterior limb of the right internal capsule. MRI Cspine:  Mild spinal canal stenosis and moderate to severe bilateral neural foraminal stenoses at C5-6 and C6-7. PMH: breast CA, GERD, HTN, osteoporosis, cerebral infarct  Family / Caregiver Present: No  Referring Practitioner: Dr. Gianna Pino: Pt states that she feels like her balance is progressing well  General Comment  Comments: Pt was sitting at the EOB with RN present completing dressing. Pt agreeable to PT intervention. Pain Screening  Patient Currently in Pain: Denies  Vital Signs  Patient Currently in Pain: Denies       Orientation     Cognition      Objective      Transfers  Sit to Stand: Contact guard assistance(From bed and chair)  Stand to sit: Contact guard assistance(verbal cues for improved eccentric control and proper alignment prior to sitting (pt tending to sit on L arm rest))  Ambulation  Ambulation?: Yes  More Ambulation?: Yes  Ambulation 1  Surface: level tile;ramp  Device: Rolling Walker  Assistance: Contact guard assistance;Minimal assistance  Quality of Gait: decreased step length & stance time left LE  Distance: 175' (Marching)  Ambulation 2  Surface - 2: level tile  Device 2: No device  Assistance 2: Contact guard assistance  Quality of Gait 2: Reciprocal pattern with decreased B foot clearance. Distance: 400' (start/stop, increased kam), 175'  Stairs/Curb  Stairs?: No                  Comment: Pt completed 2 sets of the following: 10 reps of B toe taps on a 6\" step without UE A (with MIN A 2/2 multiple LOB), 30 seconds of static standing (B) with 1 LE on the ground and opposite on a 6\" step.       2nd session: Pt was sitting in the bedside chair upon arrival. PT agreeable to PT home to be my husbands caregiver     Plan    Plan  Times per week: 5-7 days a week, 60 minutes  Current Treatment Recommendations: Balance Training, Functional Mobility Training, Strengthening, Endurance Training, Gait Training, Safety Education & Training, Patient/Caregiver Education & Training, Equipment Evaluation, Education, & procurement, Transfer Training, Modalities, Positioning, Stair training, ROM, IADL Training, Home Exercise Program, Neuromuscular Re-education, Pain Management, Manual Therapy - Soft Tissue Mobilization  Safety Devices  Type of devices: Call light within reach, Chair alarm in place, Left in chair, Nurse notified     Therapy Time   Individual Concurrent Group Co-treatment   Time In 0830         Time Out 0900         Minutes 30              Second Session Therapy Time:   Individual Concurrent Group Co-treatment   Time In 1000         Time Out 1030         Minutes 30           Timed Code Treatment Minutes:  30+30    Total Treatment Minutes:  [address], PT

## 2019-09-18 LAB
ANION GAP SERPL CALCULATED.3IONS-SCNC: 14 MMOL/L (ref 3–16)
BASOPHILS ABSOLUTE: 0.2 K/UL (ref 0–0.2)
BASOPHILS RELATIVE PERCENT: 1.9 %
BUN BLDV-MCNC: 18 MG/DL (ref 7–20)
CALCIUM SERPL-MCNC: 8.7 MG/DL (ref 8.3–10.6)
CHLORIDE BLD-SCNC: 104 MMOL/L (ref 99–110)
CO2: 25 MMOL/L (ref 21–32)
CREAT SERPL-MCNC: 0.7 MG/DL (ref 0.6–1.2)
EOSINOPHILS ABSOLUTE: 0.2 K/UL (ref 0–0.6)
EOSINOPHILS RELATIVE PERCENT: 2.2 %
GFR AFRICAN AMERICAN: >60
GFR NON-AFRICAN AMERICAN: >60
GLUCOSE BLD-MCNC: 122 MG/DL (ref 70–99)
HCT VFR BLD CALC: 39.1 % (ref 36–48)
HEMOGLOBIN: 13.5 G/DL (ref 12–16)
LYMPHOCYTES ABSOLUTE: 1.5 K/UL (ref 1–5.1)
LYMPHOCYTES RELATIVE PERCENT: 17.6 %
MCH RBC QN AUTO: 31.7 PG (ref 26–34)
MCHC RBC AUTO-ENTMCNC: 34.4 G/DL (ref 31–36)
MCV RBC AUTO: 92.1 FL (ref 80–100)
MONOCYTES ABSOLUTE: 0.8 K/UL (ref 0–1.3)
MONOCYTES RELATIVE PERCENT: 9.7 %
NEUTROPHILS ABSOLUTE: 5.7 K/UL (ref 1.7–7.7)
NEUTROPHILS RELATIVE PERCENT: 68.6 %
PDW BLD-RTO: 12.6 % (ref 12.4–15.4)
PLATELET # BLD: 287 K/UL (ref 135–450)
PMV BLD AUTO: 7.7 FL (ref 5–10.5)
POTASSIUM REFLEX MAGNESIUM: 3.6 MMOL/L (ref 3.5–5.1)
RBC # BLD: 4.25 M/UL (ref 4–5.2)
SODIUM BLD-SCNC: 143 MMOL/L (ref 136–145)
WBC # BLD: 8.3 K/UL (ref 4–11)

## 2019-09-18 PROCEDURE — 36415 COLL VENOUS BLD VENIPUNCTURE: CPT

## 2019-09-18 PROCEDURE — 97530 THERAPEUTIC ACTIVITIES: CPT

## 2019-09-18 PROCEDURE — 6370000000 HC RX 637 (ALT 250 FOR IP): Performed by: PHYSICAL MEDICINE & REHABILITATION

## 2019-09-18 PROCEDURE — 97127 HC SP THER IVNTJ W/FOCUS COG FUNCJ: CPT

## 2019-09-18 PROCEDURE — 92507 TX SP LANG VOICE COMM INDIV: CPT | Performed by: SPEECH-LANGUAGE PATHOLOGIST

## 2019-09-18 PROCEDURE — 80048 BASIC METABOLIC PNL TOTAL CA: CPT

## 2019-09-18 PROCEDURE — 97110 THERAPEUTIC EXERCISES: CPT

## 2019-09-18 PROCEDURE — 85025 COMPLETE CBC W/AUTO DIFF WBC: CPT

## 2019-09-18 PROCEDURE — 97535 SELF CARE MNGMENT TRAINING: CPT

## 2019-09-18 PROCEDURE — 97112 NEUROMUSCULAR REEDUCATION: CPT

## 2019-09-18 PROCEDURE — 6360000002 HC RX W HCPCS: Performed by: PHYSICAL MEDICINE & REHABILITATION

## 2019-09-18 PROCEDURE — 97116 GAIT TRAINING THERAPY: CPT

## 2019-09-18 PROCEDURE — 97127 HC SP THER IVNTJ W/FOCUS COG FUNCJ: CPT | Performed by: SPEECH-LANGUAGE PATHOLOGIST

## 2019-09-18 PROCEDURE — 1280000000 HC REHAB R&B

## 2019-09-18 RX ADMIN — AMLODIPINE BESYLATE 5 MG: 5 TABLET ORAL at 08:05

## 2019-09-18 RX ADMIN — ENOXAPARIN SODIUM 40 MG: 40 INJECTION SUBCUTANEOUS at 08:06

## 2019-09-18 RX ADMIN — CLOPIDOGREL BISULFATE 75 MG: 75 TABLET, FILM COATED ORAL at 08:05

## 2019-09-18 RX ADMIN — ATORVASTATIN CALCIUM 40 MG: 40 TABLET, FILM COATED ORAL at 20:43

## 2019-09-18 RX ADMIN — SERTRALINE HYDROCHLORIDE 50 MG: 50 TABLET ORAL at 08:05

## 2019-09-18 RX ADMIN — PANTOPRAZOLE SODIUM 40 MG: 40 TABLET, DELAYED RELEASE ORAL at 06:21

## 2019-09-18 RX ADMIN — ASPIRIN 81 MG 81 MG: 81 TABLET ORAL at 08:05

## 2019-09-18 ASSESSMENT — PAIN SCALES - GENERAL
PAINLEVEL_OUTOF10: 0

## 2019-09-18 NOTE — PROGRESS NOTES
Patient up in chair eating breakfast.   Denies pain. Chair alarm activated. Bedside table and call light within reach.

## 2019-09-18 NOTE — PROGRESS NOTES
Contact guard assistance  Comment: Fluctuated between a reciprocal pattern and a step to pattern pending fatigue. Comment: Pt completed 2 sets of the following: 10 reps of B toe taps on a 6\" step without UE A (with MIN A 2/2  LOB), 30 seconds of static standing (B) with 1 LE on the ground and opposite on a 6\" step. Pt completed 15 reps of supine bridges with a 3 sec hold. Pt completed 15 reps of fig 4 bridges on the L LE with a 3 sec hold. 15 reps of SLR on the L with VC for improved technqiue. 15 reps of B hamstring curls in prone. 3 minutes standing on the airex with CGA-MOD A. first 2 minutes completed static posture or trunk rotation to the L and R to command. Final minute alternating eyes open and eyes closed. 2nd session: Pt was sitting in the bedside chair upon arrival. Pt agreeable to PT intervention. Pt states that she continues to feel well. Pt ambulated distances of 175' (including cervical turns to the L and R to command), 800' (including up/down 4 curb steps, over uneven terrain, through cross walks, in/out of elevators, up 6 steps with 1 hand rail, up a hill/down a hill, up/down 60' of ramps) and short distances in the gym with CGA-MIN A pending surface. Pt completed 10 reps of step ups without the use of UEs leading with L LE. Pt given CGA-MIN A with VC controlled kam and foot placement. PT completed 2 10 reps of B hip abd/add toe taps on a 6\" step with CGA without the use of UEs for support. Pt was sitting in the bedside chair at the end of the session with chair alarm on, call light and all needs within reach.          G-Code     OutComes Score                                                     AM-PAC Score             Goals  Short term goals  Time Frame for Short term goals: 2 weeks   Short term goal 1: Pt to improve bed mobility skills to baseline by performing indep --Ongoing  Short term goal 2: Pt to Improve ambulation lilliam and indep by household distance indep

## 2019-09-18 NOTE — PROGRESS NOTES
Solids Recommendation: Regular  Liquid Consistency Recommendation: Thin    Body mass index is 26.95 kg/m². Rehabilitation Diagnosis:  Stroke, 1.1, Left Body (R Brain)     Assessment and Plan:  74yo F who came in with left sided hemiparesis     Acute lacunar thalamic/internal capsule infarct. Secondary stroke prevention. PT/OT/SLP.      Left hemiparesis. PT/OT.      HTN. Norvasc.        Bowels: Schedule colace + senna. Follow bowel movements. Enema or suppository if needed.      Bladder: Check PVR x 3. [address] if PVR > 200ml or if any volume is > 500 ml.      Sleep: Trazodone provided prn.      John Grullon D.O. M.P.H  PM&R  9/18/2019  8:44 AM

## 2019-09-18 NOTE — PATIENT CARE CONFERENCE
setup/supervision/cues and/or requires assist with presthesis/brace only  Dressing-Lower: 5 - Requires setup/supervision/cues and/or staff applies TEDS/prosthesis/brace only  Toiletin - Requires steadying assistance only  Toilet Transfer: 4 - Requires steadying assistance only < 25% assist  Shower Transfer: 4 - Minimal contact assistance, pt. expends 75% or more effort      Assessment: Pt demonstrated increased ADLs this date, requiring supervision for standing ADLs. Pt participated in IADL task at kitchen to increase sequencing and funcitonal mobility skills, pt required min A for IADL. Pt demonstrating increased fine motor coordination to participate in translation activity with noted decreased dropping. Continue OT POC. NUTRITION:  Weight: 152 lb 1.9 oz (69 kg) / Body mass index is 26.95 kg/m². Diet Order/Supplements:  DIET GENERAL;  Please see nutrition note for details. NURSING:  FIMS:  Bladder Level of Assistance: 5- Requires helper to provide or empty urinal, bedside commode, or requires set-up/cues to empty bladder (ie. helper provides self-catheter supplies )  Bladder Frequency of Accidents: ((0) no value)  Bowel Level of Assistance: 5- Requires helper to provide or empty bedpan, bedside commode, or requires set-up/cues to move bowels  Bowel Frequency of Accidents: (0)    [place] Fall Risk Score: Low 15  Wounds/Incisions/Ulcers: none   Medication Review: reviewed with patient  Pain: No complaints of pain voiced. Consultations/Labs/X-rays: BMP CBC MWF    Patient/Family Education provided by team:  Role of PT/OT, educated on importance of working on righting reactions    CASE MANAGEMENT:  Assessment:    Patient is a pleasant 67 y/o female admitted with DX of Acute CVA. 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 Patient's sister lives nearby and is able to assist if needed. Patient was independent prior to admit and an active .  Patient asked for COA services
[FreeTextEntry1] : Rapid strep test is negative.\par \par Hemoglobin A1c is elevated at 11.5 %

## 2019-09-18 NOTE — PROGRESS NOTES
Occupational Therapy  Facility/Department: [facility] ACUTE REHAB UNIT  Daily Treatment Note  NAME: Mirza Moore  : 1945  MRN: [id number]    Date of Service: 2019    Discharge Recommendations:  24 hour supervision or assist, Outpatient OT, Continue to assess pending progress  OT Equipment Recommendations  Other: continue to assess--pt's  has a shower chair, toilet raiser, grab bars in shower. Assessment   Performance deficits / Impairments: Decreased functional mobility ; Decreased ADL status; Decreased balance;Decreased cognition;Decreased safe awareness;Decreased endurance;Decreased strength;Decreased fine motor control;Decreased coordination;Decreased high-level IADLs;Decreased vision/visual deficit; Decreased sensation  Assessment: Pt demonstrated increased ADLs this date, requiring supervision for standing ADLs. Pt participated in IADL task at kitchen to increase sequencing and funcitonal mobility skills, pt required min A for IADL. Pt demonstrating increased fine motor coordination to participate in translation activity with noted decreased dropping. Continue OT POC. Treatment Diagnosis: impaired ADLs, mobility, L strength and motor control s/p CVA  Prognosis: Good  OT Education: OT Role;Plan of Care;IADL Safety  Patient Education: increasing UE coordination to support ADLs and fine motor manipulatives; participation in cooking task to increase IADL skills. REQUIRES OT FOLLOW UP: Yes  Activity Tolerance  Activity Tolerance: Patient Tolerated treatment well  Safety Devices  Safety Devices in place: Yes  Type of devices: Call light within reach; Chair alarm in place; Left in chair;Nurse notified         Patient Diagnosis(es): There were no encounter diagnoses.       has a past medical history of Vernon's esophagus, Cancer ([facility] [address] Cerebrovascular accident (CVA) due to embolism of posterior cerebral artery with infarctions of both occipital lobes ([facility] [address] Colon polyp, Gallstones, GERD (gastroesophageal cueing to correctly identify preheated oven temp requirements and to locate amount of margarine on package. Pt noted with decreased L side visual scanning to locate measuring cup in drawer. Required assist to open package of cookies due to decreased fine motor coordination/ strength. Fair safety to place cookies in oven, noted attempting to shut oven door prior to L hand being removed from cookie sheet. Balance  Standing Balance: Stand by assistance  Standing Balance  Time: 10+20 mins  Activity: funcitonal mobility, standing in kitchen for cooking task  Comment: no AE  Functional Mobility  Functional - Mobility Device: No device  Activity: Other(to/from IADL kitchen)  Assist Level: Stand by assistance  Functional Mobility Comments: verbal cueing to look upright to locate room and dining room     Transfers  Sit to stand: Supervision  Stand to sit: Supervision        Coordination  Fine Motor: Pt participated in [employer] game to increase fine motor coordination and translation necessary for ADL manipulatives and writing tasks. Pt noted with moderate difficulty to translate beads radially vs ulnarly, required verbal cueing to use correct method. Pt noted dropping approximately 25% of the time. Pt did demonstrate ability to hold 3-4 beads in palm and retrieve beads from table after dropping. Cognition  Overall Cognitive Status: Exceptions  Arousal/Alertness: Appropriate responses to stimuli  Following Commands: Follows one step commands consistently; Follows multistep commands with increased time  Attention Span: Appears intact  Memory: Appears intact  Safety Judgement: Decreased awareness of need for assistance;Good awareness of safety precautions  Problem Solving: Assistance required to generate solutions  Insights: Decreased awareness of deficits  Initiation: Requires cues for some  Sequencing: Requires cues for some  Cognition Comment: Noted decreased sequencing and initiation skills during cooking

## 2019-09-18 NOTE — PROGRESS NOTES
Other:    Assessment: Progressing towards goals. Perseverations and questionable paraphasias noted during second session. Plan: Continue as per plan of care.         Interventions used this date:  [] Speech/Language Treatment  [] Instruction in HEP  [] Dysphagia Treatment [x] Cognitive Treatment   [] Other:    Discharge recommendations:  [] Home independently  [x] Home with assistance []  24 hour supervision  [] ECF [x] Other: full assistance with money management tasks, supervision with medication management - d/w pt on 9/17/19  Continued Tx Upon Discharge: ? [x] Yes    [] No    [] TBD based on progress while on ARU     [] Vital Stim indicated     [] Other:   Estimated discharge date: Not yet established      Electronically signed by  Treatment Session #1:  Joan Alvarez [facility], [address]; [id number]  Speech-Language Pathologist      Treatment Session #2:  Irena Lopez MA CCC-SLP; [id number]  Speech-Language Pathologist

## 2019-09-19 PROCEDURE — 97127 HC SP THER IVNTJ W/FOCUS COG FUNCJ: CPT

## 2019-09-19 PROCEDURE — 97116 GAIT TRAINING THERAPY: CPT

## 2019-09-19 PROCEDURE — 92526 ORAL FUNCTION THERAPY: CPT

## 2019-09-19 PROCEDURE — 97112 NEUROMUSCULAR REEDUCATION: CPT

## 2019-09-19 PROCEDURE — 6370000000 HC RX 637 (ALT 250 FOR IP): Performed by: PHYSICAL MEDICINE & REHABILITATION

## 2019-09-19 PROCEDURE — 6360000002 HC RX W HCPCS: Performed by: PHYSICAL MEDICINE & REHABILITATION

## 2019-09-19 PROCEDURE — 97530 THERAPEUTIC ACTIVITIES: CPT

## 2019-09-19 PROCEDURE — 1280000000 HC REHAB R&B

## 2019-09-19 RX ADMIN — ATORVASTATIN CALCIUM 40 MG: 40 TABLET, FILM COATED ORAL at 21:33

## 2019-09-19 RX ADMIN — SERTRALINE HYDROCHLORIDE 50 MG: 50 TABLET ORAL at 09:52

## 2019-09-19 RX ADMIN — AMLODIPINE BESYLATE 5 MG: 5 TABLET ORAL at 09:52

## 2019-09-19 RX ADMIN — ASPIRIN 81 MG 81 MG: 81 TABLET ORAL at 09:52

## 2019-09-19 RX ADMIN — CLOPIDOGREL BISULFATE 75 MG: 75 TABLET, FILM COATED ORAL at 09:52

## 2019-09-19 RX ADMIN — PANTOPRAZOLE SODIUM 40 MG: 40 TABLET, DELAYED RELEASE ORAL at 05:59

## 2019-09-19 RX ADMIN — ENOXAPARIN SODIUM 40 MG: 40 INJECTION SUBCUTANEOUS at 09:53

## 2019-09-19 ASSESSMENT — PAIN SCALES - GENERAL
PAINLEVEL_OUTOF10: 0

## 2019-09-19 NOTE — PROGRESS NOTES
transfers evident with indep floor/stand transfers and reaching activities  Long term goal 3: Pt to perform stair mobility at modified indep needed for home environment   Long term goal 4: Pt to perform modified indep car transfer   Patient Goals   Patient goals :  To go home to be my husbands caregiver     Plan    Plan  Times per week: 5-7 days a week, 60 minutes  Current Treatment Recommendations: Balance Training, Functional Mobility Training, Strengthening, Endurance Training, Gait Training, Safety Education & Training, Patient/Caregiver Education & Training, Equipment Evaluation, Education, & procurement, Transfer Training, Modalities, Positioning, Stair training, ROM, IADL Training, Home Exercise Program, Neuromuscular Re-education, Pain Management, Manual Therapy - Soft Tissue Mobilization  Safety Devices  Type of devices: Call light within reach, Chair alarm in place, Left in chair, Nurse notified     Therapy Time   Individual Concurrent Group Co-treatment   Time In 1120         Time Out 1152         Minutes 32           Second Session Therapy Time:   Individual Concurrent Group Co-treatment   Time In 1300         Time Out 1330         Minutes 30           Timed Code Treatment Minutes:  32+30    Total Treatment Minutes:  [address] [facility], PT

## 2019-09-19 NOTE — PROGRESS NOTES
Occupational Therapy  Facility/Department: [place] [address] ACUTE REHAB UNIT  Daily Treatment Note  NAME: Dena Chavez  : 1945  MRN: [id number]    Date of Service: 2019    Discharge Recommendations:  24 hour supervision or assist, Outpatient OT, Continue to assess pending progress  OT Equipment Recommendations  Other: continue to assess--pt's  has a shower chair, toilet raiser, grab bars in shower. Assessment   Performance deficits / Impairments: Decreased functional mobility ; Decreased ADL status; Decreased balance;Decreased cognition;Decreased safe awareness;Decreased endurance;Decreased strength;Decreased fine motor control;Decreased coordination;Decreased high-level IADLs;Decreased vision/visual deficit; Decreased sensation  Assessment: Pt demonstrated increased funcitonal mobility and increased standing balance. Pt demonstrated increased scanning with visual adaptations to scan fully to L. Pt demonstrated increased IADL skills to follow instrucitons and scan in funcitonal environment. Pt would benefit from continued high level IADLs to increase safety at home, pt reporting having little outside assistance and needing to be as independent as possible to assist with her . Continue OT POC. Treatment Diagnosis: impaired ADLs, mobility, L strength and motor control s/p CVA  Prognosis: Good  OT Education: IADL Safety  Patient Education: Increasing IADLs and problem solving necessary for discharge home at highest possible level of independence. REQUIRES OT FOLLOW UP: Yes  Activity Tolerance  Activity Tolerance: Patient Tolerated treatment well  Safety Devices  Safety Devices in place: Yes  Type of devices: Call light within reach; Chair alarm in place; Left in chair;Nurse notified         Patient Diagnosis(es): There were no encounter diagnoses.       has a past medical history of Vernon's esophagus, Cancer ([facility] [address] Cerebrovascular accident (CVA) due to embolism of posterior cerebral artery with infarctions of both occipital lobes ([facility] [address] Colon polyp, Gallstones, GERD (gastroesophageal reflux disease), Hypertension, Kidney stone, Osteoporoses, Pregnancies, Shingles, Tubular adenoma nos, and UTI (urinary tract infection). has a past surgical history that includes Cholecystectomy (); bladder repair (); Colonoscopy (,3/9/2017); Hysterectomy; Appendectomy; Upper gastrointestinal endoscopy (); and Breast surgery (Left, 10/26/2017). Restrictions  Position Activity Restriction  Other position/activity restrictions: up with assist  Subjective   General  Chart Reviewed: Yes  Patient assessed for rehabilitation services?: Yes  Additional Pertinent Hx: 66year old presented with L sided weakness and had a fall at home. MRI: acute right thalamic and Internal capsule infarct . CT/CTA: cervical foraminal stenosis and right P2 segment severe stenosis vs occlusion. follow by neurosx- nothing planned  PMHx: breast cancer, partial L mastectomy, CVA occipital lobe, HTN  Family / Caregiver Present: No  Referring Practitioner: Dr Vazquez Carbajal   Diagnosis: CVA  Subjective  Subjective: Pt seated in bedside chair agreeable to OT session. General Comment  Comments: \"They want me to practice reading more I guess so I start scanning to the left more. \"      Orientation  Orientation  Overall Orientation Status: Within Normal Limits  Objective       Instrumental ADL's  Instrumental ADLs: Yes  Light Housekeeping  Light Housekeeping Level: Other(no AE)  Light Housekeeping Level of Assistance: Moderate assistance  Light Housekeepin) Pt completed various kitchen and living area duties including retrieving items from floor level and overhead level. Pt required verbal cueing for encoding cognitive tasks, i.e. placing items in various areas depending on the day of the week- cueing to remind pt of day of the week.  Pt demostrated fair short term memory to read tasks on paper and walk to complete, some difficulty with second desired items. Cognition  Arousal/Alertness: Appropriate responses to stimuli  Following Commands: Follows one step commands consistently; Follows multistep commands with increased time  Attention Span: Appears intact  Memory: Appears intact  Safety Judgement: Good awareness of safety precautions  Problem Solving: Assistance required to generate solutions  Insights: Fully aware of deficits  Initiation: Requires cues for some  Sequencing: Requires cues for some         Plan   Plan  Times per week: 5x a week for 60 mins daily   Times per day: Daily  Current Treatment Recommendations: Strengthening, Endurance Training, Balance Training, Functional Mobility Training, Safety Education & Training, Self-Care / ADL, Neuromuscular Re-education, Patient/Caregiver Education & Training, Cognitive/Perceptual Training, Equipment Evaluation, Education, & procurement, ROM, Home Management Training       Goals  Short term goals  Time Frame for Short term goals: STG=LTG   Long term goals  Time Frame for Long term goals : 2 weeks  Long term goal 1: Pt will complete toileting and toilet transfers MOD I -ongoing, continue  Long term goal 2: Pt will complete self feeding w/ LUE I'ly -goal met 9/17  Long term goal 3: Pt will complete LE dressing MOD I -ongoing, continue  Long term goal 4: Pt will demonstrate increased functional use of LUE as evident by decreased speed of at least 10 secs on 9 hole peg test  -ongoing, continue  Long term goal 5: Pt will completed light IADL task of choice in stance for 10 mins MOD I -ongoing, continue  Patient Goals   Patient goals : \"be able to walk\"       Therapy Time   Individual Concurrent Group Co-treatment   Time In 1000         Time Out 1030         Minutes 30         Timed Code Treatment Minutes: 30 Minutes   Second Session Therapy Time:   Individual Concurrent Group Co-treatment   Time In 1405         Time Out 1439         Minutes 34           Timed Code Treatment Minutes:  30+34    Total

## 2019-09-20 LAB
ANION GAP SERPL CALCULATED.3IONS-SCNC: 10 MMOL/L (ref 3–16)
BASOPHILS ABSOLUTE: 0.1 K/UL (ref 0–0.2)
BASOPHILS RELATIVE PERCENT: 1.3 %
BUN BLDV-MCNC: 18 MG/DL (ref 7–20)
CALCIUM SERPL-MCNC: 8.8 MG/DL (ref 8.3–10.6)
CHLORIDE BLD-SCNC: 105 MMOL/L (ref 99–110)
CO2: 26 MMOL/L (ref 21–32)
CREAT SERPL-MCNC: 0.6 MG/DL (ref 0.6–1.2)
EOSINOPHILS ABSOLUTE: 0.2 K/UL (ref 0–0.6)
EOSINOPHILS RELATIVE PERCENT: 2.4 %
GFR AFRICAN AMERICAN: >60
GFR NON-AFRICAN AMERICAN: >60
GLUCOSE BLD-MCNC: 120 MG/DL (ref 70–99)
HCT VFR BLD CALC: 38.6 % (ref 36–48)
HEMOGLOBIN: 13.2 G/DL (ref 12–16)
LYMPHOCYTES ABSOLUTE: 1.4 K/UL (ref 1–5.1)
LYMPHOCYTES RELATIVE PERCENT: 16.8 %
MCH RBC QN AUTO: 31.4 PG (ref 26–34)
MCHC RBC AUTO-ENTMCNC: 34.1 G/DL (ref 31–36)
MCV RBC AUTO: 92.2 FL (ref 80–100)
MONOCYTES ABSOLUTE: 0.7 K/UL (ref 0–1.3)
MONOCYTES RELATIVE PERCENT: 8.2 %
NEUTROPHILS ABSOLUTE: 6 K/UL (ref 1.7–7.7)
NEUTROPHILS RELATIVE PERCENT: 71.3 %
PDW BLD-RTO: 12.6 % (ref 12.4–15.4)
PLATELET # BLD: 296 K/UL (ref 135–450)
PMV BLD AUTO: 8 FL (ref 5–10.5)
POTASSIUM REFLEX MAGNESIUM: 3.6 MMOL/L (ref 3.5–5.1)
RBC # BLD: 4.19 M/UL (ref 4–5.2)
SODIUM BLD-SCNC: 141 MMOL/L (ref 136–145)
WBC # BLD: 8.4 K/UL (ref 4–11)

## 2019-09-20 PROCEDURE — 6360000002 HC RX W HCPCS: Performed by: PHYSICAL MEDICINE & REHABILITATION

## 2019-09-20 PROCEDURE — 92507 TX SP LANG VOICE COMM INDIV: CPT

## 2019-09-20 PROCEDURE — 85025 COMPLETE CBC W/AUTO DIFF WBC: CPT

## 2019-09-20 PROCEDURE — 97127 HC SP THER IVNTJ W/FOCUS COG FUNCJ: CPT

## 2019-09-20 PROCEDURE — 1280000000 HC REHAB R&B

## 2019-09-20 PROCEDURE — 97116 GAIT TRAINING THERAPY: CPT

## 2019-09-20 PROCEDURE — 97530 THERAPEUTIC ACTIVITIES: CPT

## 2019-09-20 PROCEDURE — 6370000000 HC RX 637 (ALT 250 FOR IP): Performed by: PHYSICAL MEDICINE & REHABILITATION

## 2019-09-20 PROCEDURE — 97535 SELF CARE MNGMENT TRAINING: CPT

## 2019-09-20 PROCEDURE — 97112 NEUROMUSCULAR REEDUCATION: CPT

## 2019-09-20 PROCEDURE — 80048 BASIC METABOLIC PNL TOTAL CA: CPT

## 2019-09-20 PROCEDURE — 36415 COLL VENOUS BLD VENIPUNCTURE: CPT

## 2019-09-20 RX ADMIN — ATORVASTATIN CALCIUM 40 MG: 40 TABLET, FILM COATED ORAL at 20:35

## 2019-09-20 RX ADMIN — ASPIRIN 81 MG 81 MG: 81 TABLET ORAL at 08:47

## 2019-09-20 RX ADMIN — CLOPIDOGREL BISULFATE 75 MG: 75 TABLET, FILM COATED ORAL at 08:46

## 2019-09-20 RX ADMIN — PANTOPRAZOLE SODIUM 40 MG: 40 TABLET, DELAYED RELEASE ORAL at 06:10

## 2019-09-20 RX ADMIN — AMLODIPINE BESYLATE 5 MG: 5 TABLET ORAL at 08:47

## 2019-09-20 RX ADMIN — SERTRALINE HYDROCHLORIDE 50 MG: 50 TABLET ORAL at 08:47

## 2019-09-20 RX ADMIN — ENOXAPARIN SODIUM 40 MG: 40 INJECTION SUBCUTANEOUS at 08:47

## 2019-09-20 ASSESSMENT — PAIN SCALES - GENERAL
PAINLEVEL_OUTOF10: 0

## 2019-09-20 ASSESSMENT — 9 HOLE PEG TEST
TESTTIME_SECONDS: 41
TESTTIME_SECONDS: 27

## 2019-09-20 NOTE — PROGRESS NOTES
(2008,3/9/2017); Hysterectomy; Appendectomy; Upper gastrointestinal endoscopy (2008); and Breast surgery (Left, 10/26/2017). Restrictions  Position Activity Restriction  Other position/activity restrictions: up with assist  Subjective   General  Chart Reviewed: Yes  Patient assessed for rehabilitation services?: Yes  Additional Pertinent Hx: 66year old presented with L sided weakness and had a fall at home. MRI: acute right thalamic and Internal capsule infarct . CT/CTA: cervical foraminal stenosis and right P2 segment severe stenosis vs occlusion. follow by neurosx- nothing planned  PMHx: breast cancer, partial L mastectomy, CVA occipital lobe, HTN  Family / Caregiver Present: No  Referring Practitioner: Dr Faith Carrion   Diagnosis: CVA  Subjective  Subjective: Pt seated in bedside chair agreeable to OT session. General Comment  Comments: . Orientation  Orientation  Overall Orientation Status: Within Normal Limits  Objective    ADL  Feeding: Independent  Grooming: Supervision  UE Bathing: Verbal cueing;Stand by assistance(pt declined completing seated, requesting to stand to bathe. Required verbal cueing to initiate tasks, standing at shower with clothes removed, unclear how to begin)  LE Bathing: Stand by assistance;Verbal cueing(cueing to sit to wash lower LEs for increased safety)  UE Dressing: Independent(pt retrieved clothes from bag and brought into bathroom)  LE Dressing: Supervision;Verbal cueing(cueing to sit to don pants as pt initially attempting to don in stance/ unsafely)  Toileting: Supervision  Additional Comments: Showering as listed above, cueing to sit on shower chair to increase safety.         Balance  Sitting Balance: Independent  Standing Balance: Supervision  Standing Balance  Time: 30  Activity: funcitonal mobility in hallway, kitchen mobility  Comment: no AE  Functional Mobility  Functional - Mobility Device: No device  Activity: To/From therapy gym  Assist Level: Supervision  Functional

## 2019-09-20 NOTE — PROGRESS NOTES
ACUTE REHAB UNIT  SPEECH/LANGUAGE PATHOLOGY      [x] Daily  [] Weekly Care Conference Note  [] Discharge    Patient:Amairani BUENO [facility]  N[place]L:[id number]  Rehab Dx/Hx: Acute CVA (cerebrovascular accident) ([facility] [address] [I63.9]    Precautions: [] Aspiration  [x] Fall risk  [] Sternal  [] Seizure [] Hip  [] Weight Bearing [] Other  ST Dx: [x] Aphasia  [] Dysarthria  [] Apraxia   [x] Oropharyngeal dysphagia [x] Cognitive Impairment  [] Other:   Date of Admit: 9/13/2019  Room #: 3101/3101-01  Date: 9/20/2019          Current Diet Order:DIET GENERAL;   Recommended Form of Meds: PO  Compensatory Swallowing Strategies: (General aspiration precautions only)     Dentition: Adequate  Vision  Vision: Impaired  Vision Exceptions: Wears glasses at all times(report some difficulty with vision that's new but not blurry)  Hearing  Hearing: Within functional limits  Barriers toward progress: Cognitive deficit, reduced insight        Date: 9/20/2019      Tx session 1 Tx session 2   Total Timed Code Min 10 25   Total Treatment Minutes 25 40   Individual Treatment Minutes 25 40   Group Treatment Minutes 0 0   Co-Treat Minutes 0 0   Brief Exception: N/A N/A   Pain Denied Denied   Pain Intervention: [] RN notified  [] Repositioned  [] Intervention offered and patient declined  [x] N/A  [] Other: [] RN notified  [] Repositioned  [] Intervention offered and patient declined  [x] N/A  [] Other:   Subjective:     Pt reported \"I walked 1,000 steps today. \" Flat, depressed affect noted with reduced eye contact with SLP during tasks. Pt denied any change in status and reported feeling \"alright. \" Cooperative throughout, extended time required for tasks. Pt up in chair upon entry reading a book. Pt agreeable to tx session. Blunted, depressed affect continued with reduced initiation for verbal output. Objective / Goals:     Patient will tolerate recommended diet without observed clinical signs of aspiration and without respiratory decline. tx tasks, deficits noted, and POC. Educated to deficits noted, aphasia, visual scanning strategies, skills targeted in tx, and results of session. Safety Devices: [x] Call light within reach  [x] Chair alarm activated and connected to nurse call light system  [] Bed alarm activated   [] Other:  [x] Call light within reach  [x] Chair alarm activated and connected to nurse call light system  [] Bed alarm activated  [] Other:      Assessment: Cognitive communication impairment with L inattention for visual tasks, reduced awareness of errors, and reduced cognitive flexibility. Mild expressive aphasia with halting, slowed speech at times and reduced use of content units in extended tasks. Oral reading errors appear to be result of visual scanning deficits combined with expressive language deficits; comprehension intact per results of WAB subtest items administered this date. Plan: Continue as per plan of care.         Interventions used this date:  [x] Speech/Language Treatment  [] Instruction in HEP  [] Dysphagia Treatment [x] Cognitive Treatment   [] Other:    Discharge recommendations:  [] Home independently  [x] Home with assistance []  24 hour supervision  [] ECF [x] Other: full assistance with money management tasks, supervision with medication management - d/w pt on 9/17/19  Continued Tx Upon Discharge: ? [x] Yes    [] No    [] TBD based on progress while on ARU     [] Vital Stim indicated     [] Other:   Estimated discharge date: 9/25/19      Electronically signed by    Medardo Perez MA CCC-SLP; [id number]  Speech-Language Pathologist

## 2019-09-20 NOTE — PROGRESS NOTES
completed static standing on B dynadiscs for a total of 4 minutes with 3minutes of standing without UE support, but remaining time pt had B UE hand held assist. Pt completed 15 lateral taps on the vestibular board with CGA for improved ability to maintain CoM over JULITO. Pt completed 15 reps of B AP taps on the vestibular with CGA-MIN A to maintain CoM over JULITO. Pt completed 15 reps of consecutive sit <> stand transfers without  the use of UEs for improved sequencing, functional strength, and endurance. Pt completed 61' of attempted tandem walking with CGA-MIN A. Pt demonstrating appropriate righting/stepping reactions as necessary.                G-Code     OutComes Score                                                     AM-PAC Score             Goals  Short term goals  Time Frame for Short term goals: 2 weeks   Short term goal 1: Pt to improve bed mobility skills to baseline by performing indep --Ongoing  Short term goal 2: Pt to Improve ambulation lilliam and indep by household distance indep  --Ongoing  Short term goal 3: Pt to manage flight of stairs with SBA --Ongoing  Short term goal 4: Pt to ambulate all surfaces with SBA with least restrictive assistive device --Ongoing  Short term goal 5: Pt to improve sitting balance to normal for ease of self care --Ongoing  Short term goal 6: Pt to improve standing balance to fair plus to good for ease and safety of transfers  --Ongoing  Long term goals  Time Frame for Long term goals : 4 weeks  Long term goal 1: Pt to return to functional baseline performing all functional transfers, community distance ( >/=1000ft) all surfaces  Long term goal 2: Pt to improve standing balance to normal needed for indep gait and transfers evident with indep floor/stand transfers and reaching activities  Long term goal 3: Pt to perform stair mobility at modified indep needed for home environment   Long term goal 4: Pt to perform modified indep car transfer   Patient Goals   Patient goals :

## 2019-09-21 PROCEDURE — 6360000002 HC RX W HCPCS: Performed by: PHYSICAL MEDICINE & REHABILITATION

## 2019-09-21 PROCEDURE — 1280000000 HC REHAB R&B

## 2019-09-21 PROCEDURE — 6370000000 HC RX 637 (ALT 250 FOR IP): Performed by: PHYSICAL MEDICINE & REHABILITATION

## 2019-09-21 RX ADMIN — SERTRALINE HYDROCHLORIDE 50 MG: 50 TABLET ORAL at 08:38

## 2019-09-21 RX ADMIN — ATORVASTATIN CALCIUM 40 MG: 40 TABLET, FILM COATED ORAL at 21:01

## 2019-09-21 RX ADMIN — CLOPIDOGREL BISULFATE 75 MG: 75 TABLET, FILM COATED ORAL at 08:38

## 2019-09-21 RX ADMIN — PANTOPRAZOLE SODIUM 40 MG: 40 TABLET, DELAYED RELEASE ORAL at 06:14

## 2019-09-21 RX ADMIN — AMLODIPINE BESYLATE 5 MG: 5 TABLET ORAL at 08:38

## 2019-09-21 RX ADMIN — ASPIRIN 81 MG 81 MG: 81 TABLET ORAL at 08:37

## 2019-09-21 RX ADMIN — ENOXAPARIN SODIUM 40 MG: 40 INJECTION SUBCUTANEOUS at 08:37

## 2019-09-21 ASSESSMENT — PAIN SCALES - GENERAL
PAINLEVEL_OUTOF10: 0

## 2019-09-21 NOTE — PROGRESS NOTES
Pt awake in chair eating breakfast. Physical assessment and vital signs as charted. Pt currently denies experiencing any pain at this time. Call light placed within reach. RN will continue to monitor Pt.

## 2019-09-22 LAB
AMORPHOUS: ABNORMAL /HPF
BILIRUBIN URINE: NEGATIVE
BLOOD, URINE: NEGATIVE
CLARITY: CLEAR
COLOR: YELLOW
EPITHELIAL CELLS, UA: ABNORMAL /HPF
GLUCOSE URINE: NEGATIVE MG/DL
KETONES, URINE: NEGATIVE MG/DL
LEUKOCYTE ESTERASE, URINE: ABNORMAL
MICROSCOPIC EXAMINATION: YES
MUCUS: ABNORMAL /LPF
NITRITE, URINE: NEGATIVE
PH UA: 6 (ref 5–8)
PROTEIN UA: NEGATIVE MG/DL
RBC UA: ABNORMAL /HPF (ref 0–2)
SPECIFIC GRAVITY UA: 1.02 (ref 1–1.03)
URINE TYPE: ABNORMAL
UROBILINOGEN, URINE: 0.2 E.U./DL
WBC UA: ABNORMAL /HPF (ref 0–5)

## 2019-09-22 PROCEDURE — 81001 URINALYSIS AUTO W/SCOPE: CPT

## 2019-09-22 PROCEDURE — 6360000002 HC RX W HCPCS: Performed by: PHYSICAL MEDICINE & REHABILITATION

## 2019-09-22 PROCEDURE — 1280000000 HC REHAB R&B

## 2019-09-22 PROCEDURE — 6370000000 HC RX 637 (ALT 250 FOR IP): Performed by: PHYSICAL MEDICINE & REHABILITATION

## 2019-09-22 RX ADMIN — PANTOPRAZOLE SODIUM 40 MG: 40 TABLET, DELAYED RELEASE ORAL at 06:27

## 2019-09-22 RX ADMIN — ASPIRIN 81 MG 81 MG: 81 TABLET ORAL at 09:16

## 2019-09-22 RX ADMIN — CLOPIDOGREL BISULFATE 75 MG: 75 TABLET, FILM COATED ORAL at 09:16

## 2019-09-22 RX ADMIN — ENOXAPARIN SODIUM 40 MG: 40 INJECTION SUBCUTANEOUS at 09:16

## 2019-09-22 RX ADMIN — AMLODIPINE BESYLATE 5 MG: 5 TABLET ORAL at 09:16

## 2019-09-22 RX ADMIN — SERTRALINE HYDROCHLORIDE 50 MG: 50 TABLET ORAL at 09:16

## 2019-09-22 RX ADMIN — ATORVASTATIN CALCIUM 40 MG: 40 TABLET, FILM COATED ORAL at 20:40

## 2019-09-22 ASSESSMENT — PAIN SCALES - GENERAL
PAINLEVEL_OUTOF10: 0

## 2019-09-23 LAB
ANION GAP SERPL CALCULATED.3IONS-SCNC: 12 MMOL/L (ref 3–16)
BASOPHILS ABSOLUTE: 0.1 K/UL (ref 0–0.2)
BASOPHILS RELATIVE PERCENT: 1.2 %
BILIRUBIN URINE: NEGATIVE
BLOOD, URINE: NEGATIVE
BUN BLDV-MCNC: 17 MG/DL (ref 7–20)
CALCIUM SERPL-MCNC: 8.8 MG/DL (ref 8.3–10.6)
CHLORIDE BLD-SCNC: 105 MMOL/L (ref 99–110)
CLARITY: CLEAR
CO2: 27 MMOL/L (ref 21–32)
COLOR: YELLOW
CREAT SERPL-MCNC: 0.6 MG/DL (ref 0.6–1.2)
CRYSTALS, UA: ABNORMAL /HPF
EOSINOPHILS ABSOLUTE: 0.2 K/UL (ref 0–0.6)
EOSINOPHILS RELATIVE PERCENT: 3 %
EPITHELIAL CELLS, UA: ABNORMAL /HPF
GFR AFRICAN AMERICAN: >60
GFR NON-AFRICAN AMERICAN: >60
GLUCOSE BLD-MCNC: 114 MG/DL (ref 70–99)
GLUCOSE URINE: NEGATIVE MG/DL
HCT VFR BLD CALC: 39.6 % (ref 36–48)
HEMOGLOBIN: 13.4 G/DL (ref 12–16)
KETONES, URINE: NEGATIVE MG/DL
LEUKOCYTE ESTERASE, URINE: ABNORMAL
LYMPHOCYTES ABSOLUTE: 1.5 K/UL (ref 1–5.1)
LYMPHOCYTES RELATIVE PERCENT: 19 %
MCH RBC QN AUTO: 31.1 PG (ref 26–34)
MCHC RBC AUTO-ENTMCNC: 33.7 G/DL (ref 31–36)
MCV RBC AUTO: 92.4 FL (ref 80–100)
MICROSCOPIC EXAMINATION: YES
MONOCYTES ABSOLUTE: 0.7 K/UL (ref 0–1.3)
MONOCYTES RELATIVE PERCENT: 9 %
NEUTROPHILS ABSOLUTE: 5.4 K/UL (ref 1.7–7.7)
NEUTROPHILS RELATIVE PERCENT: 67.8 %
NITRITE, URINE: NEGATIVE
PDW BLD-RTO: 12.6 % (ref 12.4–15.4)
PH UA: 5.5 (ref 5–8)
PLATELET # BLD: 306 K/UL (ref 135–450)
PMV BLD AUTO: 7.8 FL (ref 5–10.5)
POTASSIUM REFLEX MAGNESIUM: 3.9 MMOL/L (ref 3.5–5.1)
PROTEIN UA: NEGATIVE MG/DL
RBC # BLD: 4.29 M/UL (ref 4–5.2)
RBC UA: ABNORMAL /HPF (ref 0–2)
SODIUM BLD-SCNC: 144 MMOL/L (ref 136–145)
SPECIFIC GRAVITY UA: 1.02 (ref 1–1.03)
URINE REFLEX TO CULTURE: YES
URINE TYPE: ABNORMAL
UROBILINOGEN, URINE: 0.2 E.U./DL
WBC # BLD: 8 K/UL (ref 4–11)
WBC UA: ABNORMAL /HPF (ref 0–5)

## 2019-09-23 PROCEDURE — 36415 COLL VENOUS BLD VENIPUNCTURE: CPT

## 2019-09-23 PROCEDURE — 97116 GAIT TRAINING THERAPY: CPT

## 2019-09-23 PROCEDURE — 85025 COMPLETE CBC W/AUTO DIFF WBC: CPT

## 2019-09-23 PROCEDURE — 97530 THERAPEUTIC ACTIVITIES: CPT

## 2019-09-23 PROCEDURE — 97127 HC SP THER IVNTJ W/FOCUS COG FUNCJ: CPT

## 2019-09-23 PROCEDURE — 97110 THERAPEUTIC EXERCISES: CPT

## 2019-09-23 PROCEDURE — 81001 URINALYSIS AUTO W/SCOPE: CPT

## 2019-09-23 PROCEDURE — 6370000000 HC RX 637 (ALT 250 FOR IP): Performed by: PHYSICAL MEDICINE & REHABILITATION

## 2019-09-23 PROCEDURE — 1280000000 HC REHAB R&B

## 2019-09-23 PROCEDURE — 80048 BASIC METABOLIC PNL TOTAL CA: CPT

## 2019-09-23 PROCEDURE — 97112 NEUROMUSCULAR REEDUCATION: CPT

## 2019-09-23 PROCEDURE — 6360000002 HC RX W HCPCS: Performed by: PHYSICAL MEDICINE & REHABILITATION

## 2019-09-23 RX ADMIN — ENOXAPARIN SODIUM 40 MG: 40 INJECTION SUBCUTANEOUS at 10:16

## 2019-09-23 RX ADMIN — ATORVASTATIN CALCIUM 40 MG: 40 TABLET, FILM COATED ORAL at 21:38

## 2019-09-23 RX ADMIN — ASPIRIN 81 MG 81 MG: 81 TABLET ORAL at 10:16

## 2019-09-23 RX ADMIN — SERTRALINE HYDROCHLORIDE 50 MG: 50 TABLET ORAL at 06:00

## 2019-09-23 RX ADMIN — CLOPIDOGREL BISULFATE 75 MG: 75 TABLET, FILM COATED ORAL at 10:15

## 2019-09-23 RX ADMIN — PANTOPRAZOLE SODIUM 40 MG: 40 TABLET, DELAYED RELEASE ORAL at 06:21

## 2019-09-23 RX ADMIN — AMLODIPINE BESYLATE 5 MG: 5 TABLET ORAL at 10:16

## 2019-09-23 ASSESSMENT — PAIN SCALES - GENERAL
PAINLEVEL_OUTOF10: 0

## 2019-09-23 NOTE — PROGRESS NOTES
squeezes x20 reps. Followed by seated rest break. Pt then completed balance task by holding 3# dowel fanny and unweighted ball tossed at pt as she was instructed to hit back at therapist- 20 reps completed with occasional cues needed to hit upwards for improved coordination. Activity Tolerance: Good- rest breaks taken as appropriate and needed      Patient Education: Calling with needs- pt verb understanding    Safety Devices in Place: Left in chair with alarm on and needs in reach    Assessment: Pt tolerated session very well, completing various balance and strengthening activities. Pt is looking forward to returning home later this week. Discharge Recommendations: Home with 24hr assist, home OT   Equipment Needs:  No    Therapy Time:   Individual Concurrent Group Co-treatment   Time In  1300         Time Out  1330         Minutes  30           Timed Code Treatment Minutes:  30    Total Treatment Minutes:  30      Goals:  Short term goals  Time Frame for Short term goals: STG=LTG   Long term goals  Time Frame for Long term goals : 2 weeks  Long term goal 1: Pt will complete toileting and toilet transfers MOD I -ongoing, continue  Long term goal 2: Pt will complete self feeding w/ LUE I'ly -goal met 9/17  Long term goal 3: Pt will complete LE dressing MOD I -ongoing, continue  Long term goal 4: Pt will demonstrate increased functional use of LUE as evident by decreased speed of at least 10 secs on 9 hole peg test  -goal met 9/20  Long term goal 5: Pt will completed light IADL task of choice in stance for 10 mins MOD I -goal met, upgraded: Pt will complete multi step IADL task at mod I, no verbal cueing- ongoing, continue      Plan:      Times per week: 5x a week for 60 mins daily    Times per day: Daily    If patient is discharged prior to next treatment, this note will serve as the discharge summary.       Kraig Flores

## 2019-09-23 NOTE — PROGRESS NOTES
SLP  Current Diet : Regular  Current Liquid Diet : Thin  Diet Solids Recommendation: Regular  Liquid Consistency Recommendation: Thin    Body mass index is 25.67 kg/m². Rehabilitation Diagnosis:  Stroke, 1.1, Left Body (R Brain)     Assessment and Plan:  72yo F who came in with left sided hemiparesis     Acute lacunar thalamic/internal capsule infarct. Secondary stroke prevention. PT/OT/SLP.      Left hemiparesis. PT/OT.      HTN. Norvasc.        Bowels: Schedule colace + senna. Follow bowel movements. Enema or suppository if needed.      Bladder: Check PVR x 3. [facility] if PVR > 200ml or if any volume is > 500 ml.      Sleep: Trazodone provided prn.      BALDEV Chen.P.H  PM&R  9/23/2019  9:39 AM

## 2019-09-23 NOTE — PROGRESS NOTES
cervical foraminal stenosis and right P2 segment severe stenosis vs occlusion. follow by neurosx- nothing planned  PMHx: breast cancer, partial L mastectomy, CVA occipital lobe, HTN  Family / Caregiver Present: No  Referring Practitioner:  [facility]   Diagnosis: CVA  Subjective  Subjective: Pt seated in bedside chair agreeable to OT session. General Comment  Comments: . Objective    ADL  Additional Comments: Pt declining ADLs at this time   LE Dressing: Modified independent (donning shoes seated in recliner chair)        Balance  Sitting Balance: Independent  Standing Balance: Contact guard assistance(for dynamic stance, static stance with Supervision)  Standing Balance  Time: 25min  Activity: funcitonal mobility/transfers/ standing balance and endurance training   Comment: Pt completed standing balance and edurance training with table top grasp/fine motor tasks in stance and bean bag toss with one step,arm swing and release. Standing for 10 min, 5 min, 3min  Functional Mobility  Functional - Mobility Device: No device  Activity: To/From therapy gym  Assist Level: Stand by assistance  Functional Mobility Comments: Pt with mild ataxia with no overt LOB     Transfers  Stand Step Transfers: Stand by assistance(to and from recliner chair, therapy mat and chair with arms)  Sit to stand: Supervision  Stand to sit: Supervision  Transfer Comments: verbal cueing for safe hand placement during transfers            Type of ROM/Therapeutic Exercise  Comment: Grasp strengthening and fine motor tasks completed in stance at table for picking up buttons and translating into palm then placing in a line on table. Pt completed item retreival from rice bin with ability to  and and place 20 items.  Thera putty exercises completed x5 repsfor finger flex/ext, thumb flex/ext and lumbrical and tip pinch                     Plan  If pt discharges prior to next treatment, this note will serve as discharge summary  Plan  Times

## 2019-09-23 NOTE — PROGRESS NOTES
Pt. Lying quietly in bed. Assessment complete, VSS, afebrile, medications taken without difficulty. Pt. Denies pain, remains A & O X 4. No s/sx of distress noted. Call light and over bed table within reach, no other care needed at this time. Will continue to monitor.

## 2019-09-23 NOTE — PROGRESS NOTES
of responses in grid or L->R scanning of tabletop tasks. Plan: Continue per POC.      Interventions used this date:  [] Speech/Language Treatment  [] Instruction in HEP  [] Dysphagia Treatment [x] Cognitive Treatment   [] Other:    Discharge recommendations:  [] Home independently  [x] Home with assistance []  24 hour supervision  [] ECF [x] Other: full assistance with money management tasks, supervision with medication management - d/w pt on 9/17/19  Continued Tx Upon Discharge: ? [x] Yes    [] No    [] TBD based on progress while on ARU     [] Vital Stim indicated     [] Other:   Estimated discharge date: 9/25/19      Electronically signed by    Zehra Vargas MA CCC-SLP; [id number]  Speech-Language Pathologist

## 2019-09-23 NOTE — PROGRESS NOTES
taps (B), AP taps (B) with CGA-MIN A. Pt demonstrating improved balance with this activity than previous attempt. PT completed 15 reps of consecutive sit <> stand transfers without the use of UEs. 2nd session: Pt was sitting in the bedside chair upon arrival. Pt agreeable to PT intervention. Pt ambulated distances of 2 x 180' (including start/stop, retro-walking, cervical turns, increased kam), 260' (using line on the floor to assist with awareness of positioning to prevent large pathway deviations) with supervision (see above for gait deviations and interventions). Pt completed 15 reps of consecutive sit <> stand transfers without the use of UEs for improved functional strength and endurance. Pt completed 3 minutes of standing on the bosu ball with MIN-MOD A. Pt demonstrating brief periods of improved steadiness, but significant use of righting reactions at hips and ankles during. Pt propelled swivel stool 150' using B LEs only for improved functional strength. Pt was sitting in the bedside chair at the end of the session with chair alarm on, call light and all needs within reach.          G-Code     OutComes Score                                                     AM-PAC Score             Goals  Short term goals  Time Frame for Short term goals: 2 weeks   Short term goal 1: Pt to improve bed mobility skills to baseline by performing indep --Ongoing  Short term goal 2: Pt to Improve ambulation lilliam and indep by household distance indep  --Ongoing  Short term goal 3: Pt to manage flight of stairs with SBA --Ongoing  Short term goal 4: Pt to ambulate all surfaces with SBA with least restrictive assistive device --Ongoing  Short term goal 5: Pt to improve sitting balance to normal for ease of self care --Ongoing  Short term goal 6: Pt to improve standing balance to fair plus to good for ease and safety of transfers  --Ongoing  Long term goals  Time Frame for Long term goals : 4 weeks  Long term goal 1: Pt to

## 2019-09-24 ENCOUNTER — TELEPHONE (OUTPATIENT)
Dept: FAMILY MEDICINE CLINIC | Age: 74
End: 2019-09-24

## 2019-09-24 PROCEDURE — 92507 TX SP LANG VOICE COMM INDIV: CPT

## 2019-09-24 PROCEDURE — 1280000000 HC REHAB R&B

## 2019-09-24 PROCEDURE — 97116 GAIT TRAINING THERAPY: CPT

## 2019-09-24 PROCEDURE — 97530 THERAPEUTIC ACTIVITIES: CPT

## 2019-09-24 PROCEDURE — 97127 HC SP THER IVNTJ W/FOCUS COG FUNCJ: CPT

## 2019-09-24 PROCEDURE — 6370000000 HC RX 637 (ALT 250 FOR IP): Performed by: PHYSICAL MEDICINE & REHABILITATION

## 2019-09-24 PROCEDURE — 97535 SELF CARE MNGMENT TRAINING: CPT

## 2019-09-24 PROCEDURE — 6360000002 HC RX W HCPCS: Performed by: PHYSICAL MEDICINE & REHABILITATION

## 2019-09-24 RX ORDER — CLOPIDOGREL BISULFATE 75 MG/1
75 TABLET ORAL DAILY
Qty: 30 TABLET | Refills: 3 | Status: SHIPPED | OUTPATIENT
Start: 2019-09-24 | End: 2019-11-07 | Stop reason: SDUPTHER

## 2019-09-24 RX ORDER — ATORVASTATIN CALCIUM 40 MG/1
40 TABLET, FILM COATED ORAL NIGHTLY
Qty: 30 TABLET | Refills: 3 | Status: SHIPPED | OUTPATIENT
Start: 2019-09-24 | End: 2019-11-07 | Stop reason: SDUPTHER

## 2019-09-24 RX ORDER — PSEUDOEPHEDRINE HCL 30 MG
100 TABLET ORAL 2 TIMES DAILY
Qty: 60 CAPSULE | Refills: 1 | Status: SHIPPED | OUTPATIENT
Start: 2019-09-24 | End: 2020-02-24 | Stop reason: ALTCHOICE

## 2019-09-24 RX ORDER — ASPIRIN 81 MG/1
81 TABLET, CHEWABLE ORAL DAILY
Qty: 30 TABLET | Refills: 3 | Status: SHIPPED | OUTPATIENT
Start: 2019-09-25 | End: 2022-07-11

## 2019-09-24 RX ADMIN — PANTOPRAZOLE SODIUM 40 MG: 40 TABLET, DELAYED RELEASE ORAL at 06:11

## 2019-09-24 RX ADMIN — ASPIRIN 81 MG 81 MG: 81 TABLET ORAL at 09:32

## 2019-09-24 RX ADMIN — SERTRALINE HYDROCHLORIDE 50 MG: 50 TABLET ORAL at 09:32

## 2019-09-24 RX ADMIN — AMLODIPINE BESYLATE 5 MG: 5 TABLET ORAL at 09:32

## 2019-09-24 RX ADMIN — ENOXAPARIN SODIUM 40 MG: 40 INJECTION SUBCUTANEOUS at 09:32

## 2019-09-24 RX ADMIN — ATORVASTATIN CALCIUM 40 MG: 40 TABLET, FILM COATED ORAL at 21:51

## 2019-09-24 RX ADMIN — CLOPIDOGREL BISULFATE 75 MG: 75 TABLET, FILM COATED ORAL at 09:31

## 2019-09-24 ASSESSMENT — PAIN SCALES - GENERAL
PAINLEVEL_OUTOF10: 0
PAINLEVEL_OUTOF10: 0

## 2019-09-24 NOTE — PROGRESS NOTES
Pt awake in chair. Physical assessment and vital signs as charted. Pt currently denies experiencing any pain at this time. Call light placed within reach. RN will continue to monitor Pt.

## 2019-09-24 NOTE — TELEPHONE ENCOUNTER
Selina from [facility] is calling stating that pt is getting released from [facility] rehab center today and [facility] would like to know if Dr. Gregorio Warren willing to follow her for home care.  Please call [phone]

## 2019-09-24 NOTE — PROGRESS NOTES
Physical Therapy  Facility/Department: [facility] ACUTE REH[facility] UN[facility]  Daily Treatment Note/Discharge note  NAME: Ermalene Kawasaki  : 1945  MRN: [id number]    Date of Service: 2019    Discharge Recommendations:  24 hour supervision or assist, Home with Home health PT, S Level 1   PT Equipment Recommendations  Equipment Needed: No    Assessment   Body structures, Functions, Activity limitations: Decreased functional mobility ; Decreased strength;Decreased safe awareness;Decreased balance;Decreased endurance;Decreased cognition  Assessment: Pt has progressed her ambulation and transfers to a level of independence, but still has an occasional small LOB during ambulation that she is able to self correct. Pt is still functioning below her previous baseline and would benefit from continued therapy to increase her independence with functional mobility. Treatment Diagnosis: balance problems gait difficulty 2/2 CVA   Prognosis: Good  Patient Education: Educated on safe mobility upon d/c. Educated on waiting for home therapy to attempt assisting her spouse up/down the steps. REQUIRES PT FOLLOW UP: Yes  Activity Tolerance  Activity Tolerance: Patient Tolerated treatment well     Patient Diagnosis(es): There were no encounter diagnoses. has a past medical history of Vernon's esophagus, Cancer ([address] Cerebrovascular accident (CVA) due to embolism of posterior cerebral artery with infarctions of both occipital lobes ([address] Colon polyp, Gallstones, GERD (gastroesophageal reflux disease), Hypertension, Kidney stone, Osteoporoses, Pregnancies, Shingles, Tubular adenoma nos, and UTI (urinary tract infection). has a past surgical history that includes Cholecystectomy (); bladder repair (); Colonoscopy (,3/9/2017); Hysterectomy; Appendectomy; Upper gastrointestinal endoscopy (); and Breast surgery (Left, 10/26/2017).     Restrictions  Position Activity Restriction  Other position/activity restrictions: up with 1300' (including up 26 steps with 1 hand rail, up/down 4 curb steps, up/down a 150' hill (including on sidewalk and through grass), down a 60' ramp, down a 120' hill, over uneven surfaces, in/out of elevators, and through crowded hallways), and short distances in the gym  Comments: VC for increased attention to L LE placement and increased stance on L. VC for increased awareness of pathway deviation and safety going through crosswalks  Stairs/Curb  Stairs?: Yes  Stairs  # Steps : 17  Stairs Height: 6\"  Rails: Left ascending  Assistance: Modified independent   Comment: Reciprocal pattern. No LOB noted                  Comment: Pt completed 20 reps on the vestibular board of lateral taps (B), AP taps (B) with CGA-MIN A. Pt demonstrating improved balance with this activity than previous attempt. PT completed 20 reps of consecutive sit <> stand transfers without the use of UEs.  Pt able to retrieve multiple objects from the floor independently              G-Code     OutComes Score                                                     AM-PAC Score             Goals  Short term goals  Time Frame for Short term goals: 2 weeks   Short term goal 1: Pt to improve bed mobility skills to baseline by performing indep --goal met  Short term goal 2: Pt to Improve ambulation lilliam and indep by household distance indep  --goal met  Short term goal 3: Pt to manage flight of stairs with SBA --goal met  Short term goal 4: Pt to ambulate all surfaces with SBA with least restrictive assistive device --goal met  Short term goal 5: Pt to improve sitting balance to normal for ease of self care --goal met  Short term goal 6: Pt to improve standing balance to fair plus to good for ease and safety of transfers  --goal met  Long term goals  Time Frame for Long term goals : 4 weeks  Long term goal 1: Pt to return to functional baseline performing all functional transfers, community distance ( >/=1000ft) all surfaces--goal met  Long term goal 2: Pt

## 2019-09-25 ENCOUNTER — TELEPHONE (OUTPATIENT)
Dept: CARDIOLOGY CLINIC | Age: 74
End: 2019-09-25

## 2019-09-25 VITALS
BODY MASS INDEX: 25.66 KG/M2 | HEIGHT: 63 IN | SYSTOLIC BLOOD PRESSURE: 136 MMHG | WEIGHT: 144.84 LBS | TEMPERATURE: 97.8 F | RESPIRATION RATE: 18 BRPM | OXYGEN SATURATION: 93 % | DIASTOLIC BLOOD PRESSURE: 76 MMHG | HEART RATE: 79 BPM

## 2019-09-25 LAB
ANION GAP SERPL CALCULATED.3IONS-SCNC: 10 MMOL/L (ref 3–16)
BASOPHILS ABSOLUTE: 0.1 K/UL (ref 0–0.2)
BASOPHILS RELATIVE PERCENT: 1.2 %
BUN BLDV-MCNC: 18 MG/DL (ref 7–20)
CALCIUM SERPL-MCNC: 8.9 MG/DL (ref 8.3–10.6)
CHLORIDE BLD-SCNC: 107 MMOL/L (ref 99–110)
CO2: 28 MMOL/L (ref 21–32)
CREAT SERPL-MCNC: 0.7 MG/DL (ref 0.6–1.2)
EOSINOPHILS ABSOLUTE: 0.3 K/UL (ref 0–0.6)
EOSINOPHILS RELATIVE PERCENT: 3.5 %
GFR AFRICAN AMERICAN: >60
GFR NON-AFRICAN AMERICAN: >60
GLUCOSE BLD-MCNC: 123 MG/DL (ref 70–99)
HCT VFR BLD CALC: 38.7 % (ref 36–48)
HEMOGLOBIN: 13.2 G/DL (ref 12–16)
LYMPHOCYTES ABSOLUTE: 1.3 K/UL (ref 1–5.1)
LYMPHOCYTES RELATIVE PERCENT: 16.7 %
MCH RBC QN AUTO: 31.7 PG (ref 26–34)
MCHC RBC AUTO-ENTMCNC: 34.3 G/DL (ref 31–36)
MCV RBC AUTO: 92.4 FL (ref 80–100)
MONOCYTES ABSOLUTE: 0.7 K/UL (ref 0–1.3)
MONOCYTES RELATIVE PERCENT: 9.3 %
NEUTROPHILS ABSOLUTE: 5.2 K/UL (ref 1.7–7.7)
NEUTROPHILS RELATIVE PERCENT: 69.3 %
PDW BLD-RTO: 12.5 % (ref 12.4–15.4)
PLATELET # BLD: 304 K/UL (ref 135–450)
PMV BLD AUTO: 7.7 FL (ref 5–10.5)
POTASSIUM REFLEX MAGNESIUM: 3.9 MMOL/L (ref 3.5–5.1)
RBC # BLD: 4.18 M/UL (ref 4–5.2)
SODIUM BLD-SCNC: 145 MMOL/L (ref 136–145)
WBC # BLD: 7.5 K/UL (ref 4–11)

## 2019-09-25 PROCEDURE — 6370000000 HC RX 637 (ALT 250 FOR IP): Performed by: PHYSICAL MEDICINE & REHABILITATION

## 2019-09-25 PROCEDURE — 85025 COMPLETE CBC W/AUTO DIFF WBC: CPT

## 2019-09-25 PROCEDURE — 80048 BASIC METABOLIC PNL TOTAL CA: CPT

## 2019-09-25 PROCEDURE — 36415 COLL VENOUS BLD VENIPUNCTURE: CPT

## 2019-09-25 RX ADMIN — CLOPIDOGREL BISULFATE 75 MG: 75 TABLET, FILM COATED ORAL at 08:21

## 2019-09-25 RX ADMIN — SERTRALINE HYDROCHLORIDE 50 MG: 50 TABLET ORAL at 08:21

## 2019-09-25 RX ADMIN — ASPIRIN 81 MG 81 MG: 81 TABLET ORAL at 08:21

## 2019-09-25 RX ADMIN — AMLODIPINE BESYLATE 5 MG: 5 TABLET ORAL at 08:21

## 2019-09-25 RX ADMIN — PANTOPRAZOLE SODIUM 40 MG: 40 TABLET, DELAYED RELEASE ORAL at 06:29

## 2019-09-25 ASSESSMENT — PAIN SCALES - GENERAL: PAINLEVEL_OUTOF10: 0

## 2019-09-25 NOTE — TELEPHONE ENCOUNTER
30 day [employer] event monitor placed in office, care and mailing instructions explained to patient and family member, they expressed understanding

## 2019-09-25 NOTE — PROGRESS NOTES
Fall precautions are in place. Call light and bedside table are within reach. Denies c/o. Patient verbalizes readiness for discharge.

## 2019-09-25 NOTE — PLAN OF CARE
Problem: Falls - Risk of:  Goal: Will remain free from falls  Description  Will remain free from falls  9/19/2019 2358 by Christine Hunter RN  Outcome: Ongoing  Pt. Continues to be at risk for falls due to impaired gait and weakness. Fall prevention measures in place to promote safety and reduce the risk of falls: Bed/chair wheels locked and in lowest position, call light and over bed table within reach, hourly rounding and frequent visual checks in place. Pt. Will be free of falls during this shift. Will continue to monitor. Problem: SKIN INTEGRITY  Goal: Skin integrity is maintained or improved  Outcome: Ongoing   Skin is kept clean and dry. Pt. Turns self, encouraged to turn and reposition to relieve pressure off bony prominences to improve or maintain skin integrity. No new skin issues found. No s/sx of infection noted. Will continue to monitor.
Problem: Falls - Risk of:  Goal: Will remain free from falls  Description  Will remain free from falls  9/25/2019 0038 by Pauline Tavarez RN  Outcome: Ongoing  Note:    Pt is a High fall risk. See Benna Frankton Fall Score and ABCDS Injury Risk assessments. High Fall Risk per WEI/ABCDS: Explained fall risk precautions to pt and family and rationale behind their use to keep the patient safe. Pt bed is in low position, side rails up, call light and belongings are in reach. Fall wristband applied and present on pts wrist.  Bed alarm on. Pt encouraged to call for assistance. Will continue with hourly rounds for PO intake, pain needs, toileting and repositioning as needed.
Problem: Falls - Risk of:  Goal: Will remain free from falls  Description  Will remain free from falls  Outcome: Ongoing  Note:   Pt is a High fall risk. See Miranda Kelley Fall Score and ABCDS Injury Risk assessments. High Fall Risk per WEI/ABCDS: Explained fall risk precautions to pt and family and rationale behind their use to keep the patient safe. Pt bed is in low position, side rails up, call light and belongings are in reach. Fall wristband applied and present on pts wrist.  Bed alarm on. Pt encouraged to call for assistance. Will continue with hourly rounds for PO intake, pain needs, toileting and repositioning as needed.
Pt educated to use her call light when she needs assistance. Bed alarm on when Pt in bed and chair alarm on when Pt up in chair. Non skid socks on and call light placed within reach. RN will continue to monitor Pt.
25% assist GOAL: Toilet: 6     GOAL: Tub, Shower: 6   Primary Mode: Walk    Distance Walked: 50ft          Walk: 2 - Maximal Assistance Requires up to Maximal Assistance AND requires assistance of one person to walk between  feet (Patient performs 25-49% of locomotion effort or goes between  feet)         FIM:    FIM: GOAL: Walk/Wheel Chair: 7   Stairs: 3- Moderate Assistance Performs 50-74% of the effort to go up and down one flight of staris GOAL: Stairs: 6   Comprehension: 5 - Patient understands basic needs (hungry/hot/pain) GOAL: Comprehension: 6   Expression: 5 - Expresses basic ideas/needs only (hungry/hot/pain) GOAL: Expression: 6   Social Interaction: 4 - Patient appropriate 75-90%+ of the time GOAL: Social Interaction: 6(zoloft)   Problem Solvin - Patient solves simple/routine tasks 75-90%+  GOAL: Problem Solvin   Memory: 4 - Patient remembers 75-90%+ of the time GOAL: Memory: [facility] will be seen a minimum of 3 hours of therapy per day, a minimum of 5 out of 7 days per week  (please see above for specific treatment plan per PT/OT/SLP). [] In this rare instance due to the nature of this patient's medical involvement, this patient will be seen 15 hours per week (900 minutes within a 7 day period). In addition, dietician/nutritionist may monitor calorie count as well as intake and collaboratively work with SLP on dietary upgrades. Neuropsychology/Psychology may evaluate and provide necessary support.     Medical issues being managed closely and that require 24 hour availability of a physician:   [] Swallowing Precautions  [x] Bowel/Bladder Fx  [] Weight bearing precautions   [] Wound Care    [x] Pain Mgmt   [] Infection Protection   [x] DVT Prophylaxis   [x] Fall Precautions  [x] Fluid/Electrolyte/Nutrition Balance   [] Voice Protection   [x] Respiratory  [] Other:    Medical Prognosis: [x] Good  [] Fair    [] Guarded   Total expected IRF days 15  Anticipated

## 2019-09-25 NOTE — CARE COORDINATION
MARIA INES spoke with patient's daughter, Frankie Bear, on the phone to review the discharge plan for her mother. Patient's sister will transport patient home on 9/25/19.     Electronically signed by JANELLE Cade, ZEINAB on 9/24/2019 at 2:00 PM
home medications/ co-pay costs: Yes      DISCHARGE Disposition: Home with Home Health Care: [facility]     LOC at discharge: Not Applicable  MALIK Completed: Yes    Notification completed in HENS/PAS?:  Not Applicable    IMM Completed:   Yes, Case management has presented and reviewed [facility] letter #2 to the patient and/or family/ POA. Patient and/or family/POA verbalized understanding of their medicare rights and appeal process if needed. Patient and/or family/POA has signed, initialed and placed today's date (9/25/19) and time (0845) on IMM letter #2 on the the appropriate lines. Patient and/or family/POA, copy of letter offered and they are aware that this original copy of IMM letter #2 is available prior to discharge from the paper chart on the unit. Electronic documentation has been entered into epic for IMM letter #2 and original paper copy has been added to the paper chart at the nurses station. Transportation:  Transportation PLAN for discharge: family   [facility] ordered at discharge: Yes  [address]: [facility]   Phone: [phone]  Fax: [phone]  Orders faxed: Yes    Delilah Larsen and her family were provided with choice of provider; she and her family are in agreement with the discharge plan.     Care Transitions patient: Yes    JANELLE Loya, [facility]  Case Management Department  Ph: [phone]  Fax: [phone]

## 2019-09-26 ENCOUNTER — CARE COORDINATION (OUTPATIENT)
Dept: CASE MANAGEMENT | Age: 74
End: 2019-09-26

## 2019-09-26 ENCOUNTER — TELEPHONE (OUTPATIENT)
Dept: FAMILY MEDICINE CLINIC | Age: 74
End: 2019-09-26

## 2019-09-26 NOTE — CARE COORDINATION
[facility] Transitions Initial Follow Up Call    Call within 2 business days of discharge: Yes    Patient: Yefri Dobson Patient : 1945   MRN: [id number]  Reason for Admission:   Discharge Date: 19 RARS: Readmission Risk Score: 15      Last Discharge [address] [address]       Complaint Diagnosis Description Type Department Provider    19   Admission (Discharged) Juanito 95, DO           Spoke with: [address]: [facility]    Non-face-to-face services provided:  Obtained and reviewed discharge summary and/or continuity of care documents    Care Transitions 24 Hour Call    Do you have any ongoing symptoms?:  Yes  Patient-reported symptoms:  Weakness  Do you have a copy of your discharge instructions?:  Yes  Do you have all of your prescriptions and are they filled?:  Yes  Have you been contacted by a [address]?:  No  Have you scheduled your follow up appointment?:  Yes  How are you going to get to your appointment?:  Car - family or friend to transport  Were you discharged with any Home Care or Post Acute Services:  Yes  Post Acute Services:  Home Health (Comment: [facility])  Patient DME:  Shower chair, Other, Straight cane, Walker  Other Patient DME:  walk-in shower, handicap ht toilet, grab bar / shower, rolling walker   Do you have support at home?:  Partner/Spouse/SO  Are you an active caregiver in your home?:  Yes  Care Transitions Interventions     Other Services:  Completed (Comment: [facility] referral )      Pt states she has some ongoing left sided weakness but improves the more she uses her extremities. Denies any problems getting around or completing ADLs. Denies issues with speech, but does admit to feeling a little \"slow\" mentally. States these symptoms have not changed or worsened since discharge. States she has all of her medications and declined to review at this time as she has a lady from [employer] at her home currently.  Denies questions or

## 2019-09-30 ENCOUNTER — TELEPHONE (OUTPATIENT)
Dept: FAMILY MEDICINE CLINIC | Age: 74
End: 2019-09-30

## 2019-10-01 ENCOUNTER — TELEPHONE (OUTPATIENT)
Dept: FAMILY MEDICINE CLINIC | Age: 74
End: 2019-10-01

## 2019-10-03 ENCOUNTER — CARE COORDINATION (OUTPATIENT)
Dept: CASE MANAGEMENT | Age: 74
End: 2019-10-03

## 2019-10-07 ENCOUNTER — OFFICE VISIT (OUTPATIENT)
Dept: FAMILY MEDICINE CLINIC | Age: 74
End: 2019-10-07
Payer: MEDICARE

## 2019-10-07 ENCOUNTER — TELEPHONE (OUTPATIENT)
Dept: FAMILY MEDICINE CLINIC | Age: 74
End: 2019-10-07

## 2019-10-07 VITALS
BODY MASS INDEX: 25.37 KG/M2 | OXYGEN SATURATION: 96 % | HEART RATE: 107 BPM | SYSTOLIC BLOOD PRESSURE: 132 MMHG | WEIGHT: 143.2 LBS | DIASTOLIC BLOOD PRESSURE: 80 MMHG

## 2019-10-07 DIAGNOSIS — G46.7: ICD-10-CM

## 2019-10-07 DIAGNOSIS — I63.439 CEREBROVASCULAR ACCIDENT (CVA) DUE TO EMBOLISM OF POSTERIOR CEREBRAL ARTERY WITH INFARCTIONS OF BOTH OCCIPITAL LOBES (HCC): Primary | ICD-10-CM

## 2019-10-07 DIAGNOSIS — I67.9: ICD-10-CM

## 2019-10-07 PROCEDURE — 99495 TRANSJ CARE MGMT MOD F2F 14D: CPT | Performed by: NURSE PRACTITIONER

## 2019-10-07 PROCEDURE — 1111F DSCHRG MED/CURRENT MED MERGE: CPT | Performed by: NURSE PRACTITIONER

## 2019-10-10 ENCOUNTER — CARE COORDINATION (OUTPATIENT)
Dept: CASE MANAGEMENT | Age: 74
End: 2019-10-10

## 2019-10-11 ENCOUNTER — TELEPHONE (OUTPATIENT)
Dept: FAMILY MEDICINE CLINIC | Age: 74
End: 2019-10-11

## 2019-10-25 ENCOUNTER — TELEPHONE (OUTPATIENT)
Dept: FAMILY MEDICINE CLINIC | Age: 74
End: 2019-10-25

## 2019-11-06 ENCOUNTER — OFFICE VISIT (OUTPATIENT)
Dept: CARDIOLOGY CLINIC | Age: 74
End: 2019-11-06
Payer: MEDICARE

## 2019-11-06 VITALS
BODY MASS INDEX: 24.81 KG/M2 | WEIGHT: 140 LBS | HEART RATE: 76 BPM | SYSTOLIC BLOOD PRESSURE: 132 MMHG | DIASTOLIC BLOOD PRESSURE: 70 MMHG

## 2019-11-06 DIAGNOSIS — I63.81 ACUTE LACUNAR INFARCTION (HCC): Primary | ICD-10-CM

## 2019-11-06 PROCEDURE — G8420 CALC BMI NORM PARAMETERS: HCPCS | Performed by: INTERNAL MEDICINE

## 2019-11-06 PROCEDURE — 1036F TOBACCO NON-USER: CPT | Performed by: INTERNAL MEDICINE

## 2019-11-06 PROCEDURE — G8399 PT W/DXA RESULTS DOCUMENT: HCPCS | Performed by: INTERNAL MEDICINE

## 2019-11-06 PROCEDURE — 1090F PRES/ABSN URINE INCON ASSESS: CPT | Performed by: INTERNAL MEDICINE

## 2019-11-06 PROCEDURE — 4040F PNEUMOC VAC/ADMIN/RCVD: CPT | Performed by: INTERNAL MEDICINE

## 2019-11-06 PROCEDURE — 99204 OFFICE O/P NEW MOD 45 MIN: CPT | Performed by: INTERNAL MEDICINE

## 2019-11-06 PROCEDURE — G8484 FLU IMMUNIZE NO ADMIN: HCPCS | Performed by: INTERNAL MEDICINE

## 2019-11-06 PROCEDURE — G8428 CUR MEDS NOT DOCUMENT: HCPCS | Performed by: INTERNAL MEDICINE

## 2019-11-06 PROCEDURE — 1123F ACP DISCUSS/DSCN MKR DOCD: CPT | Performed by: INTERNAL MEDICINE

## 2019-11-06 PROCEDURE — G8598 ASA/ANTIPLAT THER USED: HCPCS | Performed by: INTERNAL MEDICINE

## 2019-11-06 RX ORDER — VIT C/B6/B5/MAGNESIUM/HERB 173 50-5-6-5MG
CAPSULE ORAL
COMMUNITY
End: 2020-07-07

## 2019-11-06 RX ORDER — ANASTROZOLE 1 MG/1
1 TABLET ORAL DAILY
COMMUNITY
End: 2020-09-03 | Stop reason: SDUPTHER

## 2019-11-06 RX ORDER — OMEGA-3S/DHA/EPA/FISH OIL/D3 300MG-1000
400 CAPSULE ORAL DAILY
COMMUNITY
End: 2020-07-07

## 2019-11-06 ASSESSMENT — ENCOUNTER SYMPTOMS
SHORTNESS OF BREATH: 0
CHEST TIGHTNESS: 0

## 2019-11-07 PROCEDURE — 93228 REMOTE 30 DAY ECG REV/REPORT: CPT | Performed by: INTERNAL MEDICINE

## 2019-11-07 RX ORDER — CLOPIDOGREL BISULFATE 75 MG/1
75 TABLET ORAL DAILY
Qty: 90 TABLET | Refills: 0 | Status: SHIPPED | OUTPATIENT
Start: 2019-11-07 | End: 2020-06-12 | Stop reason: ALTCHOICE

## 2019-11-07 RX ORDER — ATORVASTATIN CALCIUM 40 MG/1
40 TABLET, FILM COATED ORAL NIGHTLY
Qty: 90 TABLET | Refills: 0 | Status: SHIPPED | OUTPATIENT
Start: 2019-11-07 | End: 2020-06-12 | Stop reason: SDUPTHER

## 2019-11-19 ENCOUNTER — HOSPITAL ENCOUNTER (OUTPATIENT)
Dept: MAMMOGRAPHY | Age: 74
Discharge: HOME | End: 2019-11-19
Payer: MEDICARE

## 2019-11-19 DIAGNOSIS — Z12.31 VISIT FOR SCREENING MAMMOGRAM: ICD-10-CM

## 2019-11-19 PROCEDURE — 77063 BREAST TOMOSYNTHESIS BI: CPT

## 2019-11-21 DIAGNOSIS — R42 DIZZY: ICD-10-CM

## 2019-12-09 ENCOUNTER — OFFICE VISIT (OUTPATIENT)
Dept: FAMILY MEDICINE CLINIC | Age: 74
End: 2019-12-09
Payer: MEDICARE

## 2019-12-09 VITALS
HEIGHT: 63 IN | HEART RATE: 87 BPM | DIASTOLIC BLOOD PRESSURE: 70 MMHG | WEIGHT: 141.4 LBS | OXYGEN SATURATION: 97 % | SYSTOLIC BLOOD PRESSURE: 130 MMHG | BODY MASS INDEX: 25.05 KG/M2

## 2019-12-09 DIAGNOSIS — E78.2 MIXED HYPERLIPIDEMIA: ICD-10-CM

## 2019-12-09 DIAGNOSIS — I63.9 ACUTE CVA (CEREBROVASCULAR ACCIDENT) (HCC): Primary | ICD-10-CM

## 2019-12-09 DIAGNOSIS — I10 BENIGN ESSENTIAL HTN: ICD-10-CM

## 2019-12-09 DIAGNOSIS — H53.40 VISUAL FIELD DEFECT: ICD-10-CM

## 2019-12-09 PROBLEM — E78.5 HYPERLIPIDEMIA: Status: ACTIVE | Noted: 2019-12-09

## 2019-12-09 LAB
ALBUMIN SERPL-MCNC: 4.5 G/DL (ref 3.4–5)
ALP BLD-CCNC: 64 U/L (ref 40–129)
ALT SERPL-CCNC: 15 U/L (ref 10–40)
AST SERPL-CCNC: 16 U/L (ref 15–37)
BILIRUB SERPL-MCNC: 0.4 MG/DL (ref 0–1)
BILIRUBIN DIRECT: <0.2 MG/DL (ref 0–0.3)
BILIRUBIN, INDIRECT: NORMAL MG/DL (ref 0–1)
CHOLESTEROL, TOTAL: 90 MG/DL (ref 0–199)
HDLC SERPL-MCNC: 44 MG/DL (ref 40–60)
LDL CHOLESTEROL CALCULATED: 24 MG/DL
TOTAL CK: 53 U/L (ref 26–192)
TOTAL PROTEIN: 6.8 G/DL (ref 6.4–8.2)
TRIGL SERPL-MCNC: 112 MG/DL (ref 0–150)
VLDLC SERPL CALC-MCNC: 22 MG/DL

## 2019-12-09 PROCEDURE — G8399 PT W/DXA RESULTS DOCUMENT: HCPCS | Performed by: FAMILY MEDICINE

## 2019-12-09 PROCEDURE — G8484 FLU IMMUNIZE NO ADMIN: HCPCS | Performed by: FAMILY MEDICINE

## 2019-12-09 PROCEDURE — 1090F PRES/ABSN URINE INCON ASSESS: CPT | Performed by: FAMILY MEDICINE

## 2019-12-09 PROCEDURE — 1123F ACP DISCUSS/DSCN MKR DOCD: CPT | Performed by: FAMILY MEDICINE

## 2019-12-09 PROCEDURE — G8598 ASA/ANTIPLAT THER USED: HCPCS | Performed by: FAMILY MEDICINE

## 2019-12-09 PROCEDURE — 99214 OFFICE O/P EST MOD 30 MIN: CPT | Performed by: FAMILY MEDICINE

## 2019-12-09 PROCEDURE — G8417 CALC BMI ABV UP PARAM F/U: HCPCS | Performed by: FAMILY MEDICINE

## 2019-12-09 PROCEDURE — 1036F TOBACCO NON-USER: CPT | Performed by: FAMILY MEDICINE

## 2019-12-09 PROCEDURE — G8427 DOCREV CUR MEDS BY ELIG CLIN: HCPCS | Performed by: FAMILY MEDICINE

## 2019-12-09 PROCEDURE — 4040F PNEUMOC VAC/ADMIN/RCVD: CPT | Performed by: FAMILY MEDICINE

## 2019-12-09 ASSESSMENT — ENCOUNTER SYMPTOMS
NAUSEA: 0
BOWEL INCONTINENCE: 0
VOMITING: 0
ABDOMINAL PAIN: 0
BACK PAIN: 0
VISUAL CHANGE: 1
SHORTNESS OF BREATH: 0

## 2019-12-12 ENCOUNTER — OFFICE VISIT (OUTPATIENT)
Dept: CARDIOLOGY CLINIC | Age: 74
End: 2019-12-12
Payer: MEDICARE

## 2019-12-12 ENCOUNTER — PREP FOR PROCEDURE (OUTPATIENT)
Dept: CARDIOLOGY CLINIC | Age: 74
End: 2019-12-12

## 2019-12-12 VITALS
SYSTOLIC BLOOD PRESSURE: 130 MMHG | HEART RATE: 66 BPM | BODY MASS INDEX: 25.41 KG/M2 | WEIGHT: 143.4 LBS | DIASTOLIC BLOOD PRESSURE: 68 MMHG | HEIGHT: 63 IN

## 2019-12-12 DIAGNOSIS — I63.81 ACUTE LACUNAR INFARCTION (HCC): Primary | ICD-10-CM

## 2019-12-12 DIAGNOSIS — I10 ESSENTIAL HYPERTENSION: ICD-10-CM

## 2019-12-12 PROCEDURE — G8484 FLU IMMUNIZE NO ADMIN: HCPCS | Performed by: INTERNAL MEDICINE

## 2019-12-12 PROCEDURE — 4040F PNEUMOC VAC/ADMIN/RCVD: CPT | Performed by: INTERNAL MEDICINE

## 2019-12-12 PROCEDURE — G8399 PT W/DXA RESULTS DOCUMENT: HCPCS | Performed by: INTERNAL MEDICINE

## 2019-12-12 PROCEDURE — 93000 ELECTROCARDIOGRAM COMPLETE: CPT | Performed by: INTERNAL MEDICINE

## 2019-12-12 PROCEDURE — G8427 DOCREV CUR MEDS BY ELIG CLIN: HCPCS | Performed by: INTERNAL MEDICINE

## 2019-12-12 PROCEDURE — 1090F PRES/ABSN URINE INCON ASSESS: CPT | Performed by: INTERNAL MEDICINE

## 2019-12-12 PROCEDURE — 1036F TOBACCO NON-USER: CPT | Performed by: INTERNAL MEDICINE

## 2019-12-12 PROCEDURE — 99204 OFFICE O/P NEW MOD 45 MIN: CPT | Performed by: INTERNAL MEDICINE

## 2019-12-12 PROCEDURE — G8417 CALC BMI ABV UP PARAM F/U: HCPCS | Performed by: INTERNAL MEDICINE

## 2019-12-12 PROCEDURE — G8598 ASA/ANTIPLAT THER USED: HCPCS | Performed by: INTERNAL MEDICINE

## 2019-12-12 PROCEDURE — 1123F ACP DISCUSS/DSCN MKR DOCD: CPT | Performed by: INTERNAL MEDICINE

## 2019-12-12 RX ORDER — SODIUM CHLORIDE 0.9 % (FLUSH) 0.9 %
10 SYRINGE (ML) INJECTION EVERY 12 HOURS SCHEDULED
Status: CANCELLED | OUTPATIENT
Start: 2019-12-12

## 2019-12-12 RX ORDER — SODIUM CHLORIDE 9 MG/ML
INJECTION, SOLUTION INTRAVENOUS CONTINUOUS
Status: CANCELLED | OUTPATIENT
Start: 2019-12-12

## 2019-12-12 RX ORDER — SODIUM CHLORIDE 0.9 % (FLUSH) 0.9 %
10 SYRINGE (ML) INJECTION PRN
Status: CANCELLED | OUTPATIENT
Start: 2019-12-12

## 2019-12-18 ENCOUNTER — HOSPITAL ENCOUNTER (OUTPATIENT)
Dept: CARDIAC CATH/INVASIVE PROCEDURES | Age: 74
Discharge: HOME | End: 2019-12-18
Attending: INTERNAL MEDICINE | Admitting: INTERNAL MEDICINE
Payer: MEDICARE

## 2019-12-18 VITALS — WEIGHT: 145 LBS | TEMPERATURE: 97 F | HEIGHT: 63 IN | BODY MASS INDEX: 25.69 KG/M2

## 2019-12-18 DIAGNOSIS — Z45.09 ENCOUNTER FOR LOOP RECORDER CHECK: Primary | ICD-10-CM

## 2019-12-18 PROCEDURE — 33285 INSJ SUBQ CAR RHYTHM MNTR: CPT

## 2019-12-18 PROCEDURE — C1764 EVENT RECORDER, CARDIAC: HCPCS

## 2019-12-18 PROCEDURE — 33285 INSJ SUBQ CAR RHYTHM MNTR: CPT | Performed by: INTERNAL MEDICINE

## 2019-12-18 RX ORDER — SODIUM CHLORIDE 0.9 % (FLUSH) 0.9 %
10 SYRINGE (ML) INJECTION PRN
Status: DISCONTINUED | OUTPATIENT
Start: 2019-12-18 | End: 2019-12-18 | Stop reason: HOSPADM

## 2019-12-18 RX ORDER — SODIUM CHLORIDE 0.9 % (FLUSH) 0.9 %
10 SYRINGE (ML) INJECTION EVERY 12 HOURS SCHEDULED
Status: DISCONTINUED | OUTPATIENT
Start: 2019-12-18 | End: 2019-12-18 | Stop reason: HOSPADM

## 2019-12-18 RX ORDER — SODIUM CHLORIDE 9 MG/ML
INJECTION, SOLUTION INTRAVENOUS CONTINUOUS
Status: DISCONTINUED | OUTPATIENT
Start: 2019-12-18 | End: 2019-12-18 | Stop reason: HOSPADM

## 2019-12-26 ENCOUNTER — NURSE ONLY (OUTPATIENT)
Dept: CARDIOLOGY CLINIC | Age: 74
End: 2019-12-26
Payer: MEDICARE

## 2019-12-26 ENCOUNTER — NURSE ONLY (OUTPATIENT)
Dept: CARDIOLOGY CLINIC | Age: 74
End: 2019-12-26

## 2019-12-26 DIAGNOSIS — Z95.0 CARDIAC PACEMAKER IN SITU: Primary | ICD-10-CM

## 2019-12-26 DIAGNOSIS — Z45.09 ENCOUNTER FOR LOOP RECORDER CHECK: ICD-10-CM

## 2019-12-26 PROCEDURE — 93291 INTERROG DEV EVAL SCRMS IP: CPT | Performed by: INTERNAL MEDICINE

## 2020-01-20 ENCOUNTER — OFFICE VISIT (OUTPATIENT)
Dept: CARDIOLOGY CLINIC | Age: 75
End: 2020-01-20
Payer: MEDICARE

## 2020-01-20 ENCOUNTER — NURSE ONLY (OUTPATIENT)
Dept: CARDIOLOGY CLINIC | Age: 75
End: 2020-01-20
Payer: MEDICARE

## 2020-01-20 VITALS
HEART RATE: 73 BPM | SYSTOLIC BLOOD PRESSURE: 128 MMHG | WEIGHT: 141 LBS | DIASTOLIC BLOOD PRESSURE: 72 MMHG | BODY MASS INDEX: 24.98 KG/M2 | HEIGHT: 63 IN

## 2020-01-20 PROCEDURE — 93291 INTERROG DEV EVAL SCRMS IP: CPT | Performed by: INTERNAL MEDICINE

## 2020-01-20 PROCEDURE — 1123F ACP DISCUSS/DSCN MKR DOCD: CPT | Performed by: NURSE PRACTITIONER

## 2020-01-20 PROCEDURE — 4040F PNEUMOC VAC/ADMIN/RCVD: CPT | Performed by: NURSE PRACTITIONER

## 2020-01-20 PROCEDURE — G8399 PT W/DXA RESULTS DOCUMENT: HCPCS | Performed by: NURSE PRACTITIONER

## 2020-01-20 PROCEDURE — 93000 ELECTROCARDIOGRAM COMPLETE: CPT | Performed by: NURSE PRACTITIONER

## 2020-01-20 PROCEDURE — 99213 OFFICE O/P EST LOW 20 MIN: CPT | Performed by: NURSE PRACTITIONER

## 2020-01-20 PROCEDURE — G8427 DOCREV CUR MEDS BY ELIG CLIN: HCPCS | Performed by: NURSE PRACTITIONER

## 2020-01-20 PROCEDURE — G8420 CALC BMI NORM PARAMETERS: HCPCS | Performed by: NURSE PRACTITIONER

## 2020-01-20 PROCEDURE — 1036F TOBACCO NON-USER: CPT | Performed by: NURSE PRACTITIONER

## 2020-01-20 PROCEDURE — 1090F PRES/ABSN URINE INCON ASSESS: CPT | Performed by: NURSE PRACTITIONER

## 2020-01-20 PROCEDURE — G8484 FLU IMMUNIZE NO ADMIN: HCPCS | Performed by: NURSE PRACTITIONER

## 2020-01-20 NOTE — PROGRESS NOTES
Past Medical History:   Diagnosis Date    Vernon's esophagus     Cancer [facility])     breast    Cerebrovascular accident (CVA) due to embolism of posterior cerebral artery with infarctions of both occipital lobes ([facility] [address] 1/10/2018    Colon polyp 1/15/2016    Gallstones     GERD (gastroesophageal reflux disease)     Hypertension     Kidney stone     Osteoporoses     Pregnancies     2- vaginal deliveries- 2    Shingles     Tubular adenoma nos     UTI (urinary tract infection)         Past Surgical History:   Procedure Laterality Date    APPENDECTOMY      BLADDER REPAIR  9/09    cah    BREAST SURGERY Left 10/26/2017    Left breastpartial mastectomy, needle localization, lymph node dissection    CHOLECYSTECTOMY  1988    COLONOSCOPY  2008,3/9/2017    polyp    HYSTERECTOMY      UPPER GASTROINTESTINAL ENDOSCOPY  2008       Allergies   Allergen Reactions    Sulfa Antibiotics     Sulfamethoxazole-Trimethoprim Other (See Comments)    Vicodin [Hydrocodone-Acetaminophen] Nausea And Vomiting       Social History:  Reviewed. reports that she has never smoked. She has never used smokeless tobacco. She reports current alcohol use of about 1.0 standard drinks of alcohol per week. She reports that she does not use drugs. Family History:  Reviewed. family history includes Breast Cancer in her mother; Cancer (age of onset: 61) in her sister; Cancer (age of onset: 67) in her mother; Cirrhosis in her mother; Coronary Art Dis in her mother and sister; Elevated Lipids in her sister; Heart Disease in her mother and sister; High Blood Pressure in her sister and sister; High Cholesterol in her sister; Other in her father and sister; Stroke in her father and sister. Review of System:    · Constitutional: No fevers, chills. · Eyes: No visual changes or diplopia. No scleral icterus. · ENT: No Headaches. No mouth sores or sore throat.   · Cardiovascular: No for chest pain, No for dyspnea on exertion, No for palpitations or No for loss of consciousness. No cough, hemoptysis, No for pleuritic pain, or phlebitis. · Respiratory: No for cough or wheezing. No hematemesis. · Gastrointestinal: No abdominal pain, blood in stools. · Genitourinary: No dysuria, or hematuria. · Musculoskeletal: No gait disturbance,    · Integumentary: No rash or pruritis. · Neurological: No headache, change in muscle strength, numbness or tingling. · Psychiatric: No anxiety, or depression. · Endocrine: No temperature intolerance. No excessive thirst, fluid intake, or urination. · Hem/Lymph: No abnormal bruising or bleeding, blood clots or swollen lymph nodes. · Allergic/Immunologic: No nasal congestion or hives. Physical Examination:  Vitals:    01/20/20 1059   BP: 128/72   Pulse: 73         Wt Readings from Last 3 Encounters:   01/20/20 141 lb (64 kg)   12/18/19 145 lb (65.8 kg)   12/12/19 143 lb 6.4 oz (65 kg)       · Constitutional: Oriented. No distress. · Head: Normocephalic and atraumatic. · Mouth/Throat: Oropharynx is clear and moist.   · Eyes: Conjunctivae clear without jaunduice. PERRL. · Neck: Neck supple. No rigidity. No JVD present. · Cardiovascular: Normal rate, regular rhythm, S1&S2. · Pulmonary/Chest: Bilateral respiratory sounds. No wheezes, No rhonchi. · Abdominal: Soft. Bowel sounds present. No distension, No tenderness. · Musculoskeletal: No tenderness. No edema    · Lymphadenopathy: Has no cervical adenopathy. · Neurological: Alert and oriented. Cranial nerve appears intact, No Gross deficit   · Skin: Skin is warm and dry. No rash noted. · Psychiatric: Has a normal mood, affect and behavior     Labs:  Reviewed. No results for input(s): NA, K, CL, CO2, PHOS, BUN, CREATININE in the last 72 hours. Invalid input(s): CA,  TSH  No results for input(s): WBC, HGB, HCT, MCV, PLT in the last 72 hours.   Lab Results   Component Value Date    CKTOTAL 53 12/09/2019    TROPONINI <0.01 09/06/2019     No 10/14/2010        Assessment:   1. Encounter for loop recorder check    2. Acute CVA (cerebrovascular accident) [facility])      Cardiac HX: Erick Pimentel is a 66 y.o. woman with a h/o HTN, CVA, (2018), presented 9/6/19 with L leg/arm weakness, MRI confirmed R thalamic acute infarct, CTA of head/neck showed repro vascular disease, reveals a 30-day monitor post hospital stay however did wear a 24-hour Holter in 2018 following her first stroke that showed no AF/AFL, status post ILR on 12/18/2019. CVA  - S/p ILR  - Incision healed well  - Device check today shows no arrhythmias  - F/u in 6 months with NP  - ECG ordered and results personally reviewed       EF of 74-07%  No systolic HF  No known CAD  No known AF  No Tobacco use. All questions and concerns were addressed to the patient/family. Alternatives to my treatment were discussed. The note was completed using EMR. Every effort was made to ensure accuracy; however, inadvertent computerized transcription errors may be present. Patient received education regarding their diagnosis, treatment and medications while in the office today.       Sonny Stephens [address]

## 2020-02-24 ENCOUNTER — OFFICE VISIT (OUTPATIENT)
Dept: FAMILY MEDICINE CLINIC | Age: 75
End: 2020-02-24
Payer: MEDICARE

## 2020-02-24 VITALS
BODY MASS INDEX: 25.09 KG/M2 | OXYGEN SATURATION: 97 % | DIASTOLIC BLOOD PRESSURE: 70 MMHG | SYSTOLIC BLOOD PRESSURE: 120 MMHG | HEART RATE: 96 BPM | HEIGHT: 63 IN | WEIGHT: 141.6 LBS

## 2020-02-24 PROBLEM — I63.9 ACUTE CVA (CEREBROVASCULAR ACCIDENT) (HCC): Status: RESOLVED | Noted: 2019-09-13 | Resolved: 2020-02-24

## 2020-02-24 PROCEDURE — 1123F ACP DISCUSS/DSCN MKR DOCD: CPT | Performed by: FAMILY MEDICINE

## 2020-02-24 PROCEDURE — G8399 PT W/DXA RESULTS DOCUMENT: HCPCS | Performed by: FAMILY MEDICINE

## 2020-02-24 PROCEDURE — 99214 OFFICE O/P EST MOD 30 MIN: CPT | Performed by: FAMILY MEDICINE

## 2020-02-24 PROCEDURE — G8417 CALC BMI ABV UP PARAM F/U: HCPCS | Performed by: FAMILY MEDICINE

## 2020-02-24 PROCEDURE — G8427 DOCREV CUR MEDS BY ELIG CLIN: HCPCS | Performed by: FAMILY MEDICINE

## 2020-02-24 PROCEDURE — G8510 SCR DEP NEG, NO PLAN REQD: HCPCS | Performed by: FAMILY MEDICINE

## 2020-02-24 PROCEDURE — 1090F PRES/ABSN URINE INCON ASSESS: CPT | Performed by: FAMILY MEDICINE

## 2020-02-24 PROCEDURE — 3288F FALL RISK ASSESSMENT DOCD: CPT | Performed by: FAMILY MEDICINE

## 2020-02-24 PROCEDURE — 1036F TOBACCO NON-USER: CPT | Performed by: FAMILY MEDICINE

## 2020-02-24 PROCEDURE — 4040F PNEUMOC VAC/ADMIN/RCVD: CPT | Performed by: FAMILY MEDICINE

## 2020-02-24 PROCEDURE — G8484 FLU IMMUNIZE NO ADMIN: HCPCS | Performed by: FAMILY MEDICINE

## 2020-02-24 RX ORDER — AMLODIPINE BESYLATE 5 MG/1
5 TABLET ORAL DAILY
Qty: 90 TABLET | Refills: 3 | Status: SHIPPED | OUTPATIENT
Start: 2020-02-24 | End: 2020-06-12 | Stop reason: SDUPTHER

## 2020-02-24 SDOH — ECONOMIC STABILITY: FOOD INSECURITY: WITHIN THE PAST 12 MONTHS, THE FOOD YOU BOUGHT JUST DIDN'T LAST AND YOU DIDN'T HAVE MONEY TO GET MORE.: NEVER TRUE

## 2020-02-24 SDOH — ECONOMIC STABILITY: TRANSPORTATION INSECURITY
IN THE PAST 12 MONTHS, HAS THE LACK OF TRANSPORTATION KEPT YOU FROM MEDICAL APPOINTMENTS OR FROM GETTING MEDICATIONS?: NO

## 2020-02-24 SDOH — ECONOMIC STABILITY: INCOME INSECURITY: HOW HARD IS IT FOR YOU TO PAY FOR THE VERY BASICS LIKE FOOD, HOUSING, MEDICAL CARE, AND HEATING?: NOT HARD AT ALL

## 2020-02-24 SDOH — ECONOMIC STABILITY: TRANSPORTATION INSECURITY
IN THE PAST 12 MONTHS, HAS LACK OF TRANSPORTATION KEPT YOU FROM MEETINGS, WORK, OR FROM GETTING THINGS NEEDED FOR DAILY LIVING?: NO

## 2020-02-24 SDOH — ECONOMIC STABILITY: FOOD INSECURITY: WITHIN THE PAST 12 MONTHS, YOU WORRIED THAT YOUR FOOD WOULD RUN OUT BEFORE YOU GOT MONEY TO BUY MORE.: NEVER TRUE

## 2020-02-24 ASSESSMENT — ENCOUNTER SYMPTOMS
NAUSEA: 0
VOMITING: 0
BACK PAIN: 0
VISUAL CHANGE: 1
ABDOMINAL PAIN: 0
BOWEL INCONTINENCE: 0
SHORTNESS OF BREATH: 0

## 2020-02-24 ASSESSMENT — PATIENT HEALTH QUESTIONNAIRE - PHQ9
1. LITTLE INTEREST OR PLEASURE IN DOING THINGS: 0
SUM OF ALL RESPONSES TO PHQ QUESTIONS 1-9: 0
SUM OF ALL RESPONSES TO PHQ QUESTIONS 1-9: 0
SUM OF ALL RESPONSES TO PHQ9 QUESTIONS 1 & 2: 0
2. FEELING DOWN, DEPRESSED OR HOPELESS: 0

## 2020-02-24 NOTE — PROGRESS NOTES
Cardiovascular: Negative for chest pain, palpitations and near-syncope. Gastrointestinal: Negative for abdominal pain, bowel incontinence, nausea and vomiting. Genitourinary: Negative for bladder incontinence. Musculoskeletal: Negative for back pain and neck pain. Neurological: Positive for focal weakness (L sided). Negative for dizziness, vertigo, syncope, weakness, light-headedness, headaches and loss of balance. Psychiatric/Behavioral: Negative for confusion and memory loss. Objective:   Physical Exam  Physical Exam  Vitals signs reviewed. Constitutional:       General: She is not in acute distress. Appearance: She is well-developed. Eyes:      Conjunctiva/sclera: Conjunctivae normal.      Pupils: Pupils are equal, round, and reactive to light. Neck:      Thyroid: No thyromegaly. Vascular: No JVD. Trachea: No tracheal deviation. Cardiovascular:      Rate and Rhythm: Normal rate and regular rhythm. Heart sounds: Normal heart sounds. No murmur. No gallop. Pulmonary:      Effort: Pulmonary effort is normal. No respiratory distress. Breath sounds: Normal breath sounds. No stridor. No wheezing or rales. Chest:      Chest wall: No tenderness. Abdominal:      General: Bowel sounds are normal. There is no distension. Palpations: Abdomen is soft. There is no mass. Tenderness: There is no abdominal tenderness. Musculoskeletal:         General: No tenderness. Lymphadenopathy:      Cervical: No cervical adenopathy. Skin:     General: Skin is warm and dry. Coloration: Skin is not pale. Findings: No erythema or rash. Neurological:      Mental Status: She is alert and oriented to person, place, and time. Cranial Nerves: No cranial nerve deficit. Motor: No abnormal muscle tone.       Coordination: Coordination normal.      Deep Tendon Reflexes: Reflexes normal.   Psychiatric:         Behavior: Behavior normal.         Thought Content: Thought content normal.         Judgment: Judgment normal.         Assessment and Plan:     Cerebrovascular accident (CVA) due to embolism of posterior cerebral artery with infarctions of both occipital lobes ([address]  Will see eye doc    Benign essential HTN  Stable--same plan    Hyperlipidemia  At goal

## 2020-02-25 ENCOUNTER — NURSE ONLY (OUTPATIENT)
Dept: CARDIOLOGY CLINIC | Age: 75
End: 2020-02-25
Payer: MEDICARE

## 2020-02-25 PROCEDURE — G2066 INTER DEVC REMOTE 30D: HCPCS | Performed by: INTERNAL MEDICINE

## 2020-02-25 PROCEDURE — 93298 REM INTERROG DEV EVAL SCRMS: CPT | Performed by: INTERNAL MEDICINE

## 2020-03-24 NOTE — PROGRESS NOTES
Remote interrogation of implanted cardiac event monitor shows normal function. ILR for CVA. No AF recorded to date. No new arrhythmias/events recorded. Follow up 1 month via carelink.   4/27

## 2020-03-26 ENCOUNTER — NURSE ONLY (OUTPATIENT)
Dept: CARDIOLOGY CLINIC | Age: 75
End: 2020-03-26

## 2020-04-23 RX ORDER — OMEPRAZOLE 10 MG/1
CAPSULE, DELAYED RELEASE ORAL
Qty: 90 CAPSULE | Refills: 0 | Status: SHIPPED | OUTPATIENT
Start: 2020-04-23 | End: 2020-12-04

## 2020-04-27 ENCOUNTER — NURSE ONLY (OUTPATIENT)
Dept: CARDIOLOGY CLINIC | Age: 75
End: 2020-04-27
Payer: MEDICARE

## 2020-04-27 PROCEDURE — G2066 INTER DEVC REMOTE 30D: HCPCS | Performed by: INTERNAL MEDICINE

## 2020-04-27 PROCEDURE — 93298 REM INTERROG DEV EVAL SCRMS: CPT | Performed by: INTERNAL MEDICINE

## 2020-04-27 NOTE — PROGRESS NOTES
Carelink remote Linq report shows no arrhythmias/episodes. Implanted for Cryptogenic Stroke. Please see interrogation for more detail. We will continue to follow the Patient remotely.

## 2020-05-28 ENCOUNTER — NURSE ONLY (OUTPATIENT)
Dept: CARDIOLOGY CLINIC | Age: 75
End: 2020-05-28
Payer: MEDICARE

## 2020-05-28 PROCEDURE — G2066 INTER DEVC REMOTE 30D: HCPCS | Performed by: INTERNAL MEDICINE

## 2020-05-28 PROCEDURE — 93298 REM INTERROG DEV EVAL SCRMS: CPT | Performed by: INTERNAL MEDICINE

## 2020-06-03 ENCOUNTER — TELEPHONE (OUTPATIENT)
Dept: FAMILY MEDICINE CLINIC | Age: 75
End: 2020-06-03

## 2020-06-12 ENCOUNTER — OFFICE VISIT (OUTPATIENT)
Dept: FAMILY MEDICINE CLINIC | Age: 75
End: 2020-06-12
Payer: MEDICARE

## 2020-06-12 VITALS
BODY MASS INDEX: 25.69 KG/M2 | OXYGEN SATURATION: 96 % | SYSTOLIC BLOOD PRESSURE: 148 MMHG | TEMPERATURE: 98.3 F | WEIGHT: 145 LBS | HEART RATE: 66 BPM | DIASTOLIC BLOOD PRESSURE: 72 MMHG

## 2020-06-12 PROCEDURE — 4040F PNEUMOC VAC/ADMIN/RCVD: CPT | Performed by: FAMILY MEDICINE

## 2020-06-12 PROCEDURE — G8417 CALC BMI ABV UP PARAM F/U: HCPCS | Performed by: FAMILY MEDICINE

## 2020-06-12 PROCEDURE — G8427 DOCREV CUR MEDS BY ELIG CLIN: HCPCS | Performed by: FAMILY MEDICINE

## 2020-06-12 PROCEDURE — 1036F TOBACCO NON-USER: CPT | Performed by: FAMILY MEDICINE

## 2020-06-12 PROCEDURE — G8399 PT W/DXA RESULTS DOCUMENT: HCPCS | Performed by: FAMILY MEDICINE

## 2020-06-12 PROCEDURE — 1090F PRES/ABSN URINE INCON ASSESS: CPT | Performed by: FAMILY MEDICINE

## 2020-06-12 PROCEDURE — 99214 OFFICE O/P EST MOD 30 MIN: CPT | Performed by: FAMILY MEDICINE

## 2020-06-12 PROCEDURE — 1123F ACP DISCUSS/DSCN MKR DOCD: CPT | Performed by: FAMILY MEDICINE

## 2020-06-12 RX ORDER — ATORVASTATIN CALCIUM 40 MG/1
40 TABLET, FILM COATED ORAL NIGHTLY
Qty: 90 TABLET | Refills: 3 | Status: SHIPPED | OUTPATIENT
Start: 2020-06-12 | End: 2020-09-03 | Stop reason: SDUPTHER

## 2020-06-12 RX ORDER — AMLODIPINE BESYLATE 5 MG/1
5 TABLET ORAL DAILY
Qty: 90 TABLET | Refills: 3 | Status: SHIPPED | OUTPATIENT
Start: 2020-06-12 | End: 2020-09-03 | Stop reason: SDUPTHER

## 2020-06-12 ASSESSMENT — ENCOUNTER SYMPTOMS
BOWEL INCONTINENCE: 0
ABDOMINAL PAIN: 0
VISUAL CHANGE: 1
BACK PAIN: 0
SHORTNESS OF BREATH: 0
VOMITING: 0
BLURRED VISION: 0
ORTHOPNEA: 0
NAUSEA: 0

## 2020-06-12 NOTE — PROGRESS NOTES
gradually. The problem is unchanged. There was no focality noted. Pertinent negatives include no abdominal pain, auditory change, aura, back pain, bladder incontinence, bowel incontinence, chest pain, confusion, diaphoresis, dizziness, fatigue, fever, headaches, light-headedness, nausea, neck pain, palpitations, shortness of breath, vertigo or vomiting. Past treatments include aspirin. The treatment provided moderate relief. Her past medical history is significant for a CVA. There is no history of a bleeding disorder, a clotting disorder, dementia, head trauma, liver disease, mood changes or seizures. Allergies   Allergen Reactions    Sulfa Antibiotics     Sulfamethoxazole-Trimethoprim Other (See Comments)    Vicodin [Hydrocodone-Acetaminophen] Nausea And Vomiting       Current Outpatient Medications   Medication Sig Dispense Refill    amLODIPine (NORVASC) 5 MG tablet Take 1 tablet by mouth daily 90 tablet 3    atorvastatin (LIPITOR) 40 MG tablet Take 1 tablet by mouth nightly 90 tablet 3    omeprazole (PRILOSEC) 10 MG delayed release capsule Take 1 capsule by mouth once daily 90 capsule 0    sertraline (ZOLOFT) 50 MG tablet Take 1 tablet by mouth daily 90 tablet 3    vitamin D3 (CHOLECALCIFEROL) 10 MCG (400 UNIT) TABS tablet Take 400 Units by mouth daily      Turmeric 500 MG CAPS Take by mouth      anastrozole (ARIMIDEX) 1 MG tablet Take 1 mg by mouth daily      aspirin 81 MG chewable tablet Take 1 tablet by mouth daily 30 tablet 3    Psyllium (METAMUCIL PO) Take by mouth      calcium carbonate 600 MG TABS tablet Take 1 tablet by mouth daily      Multiple Vitamin (MULTIVITAMIN PO) Take  by mouth.  clopidogrel (PLAVIX) 75 MG tablet Take 1 tablet by mouth daily (Patient not taking: Reported on 6/12/2020) 90 tablet 0     No current facility-administered medications for this visit.         Past Medical History:   Diagnosis Date    Vernon's esophagus     Cancer [facility])     breast   

## 2020-06-22 ENCOUNTER — TELEPHONE (OUTPATIENT)
Dept: CARDIOLOGY CLINIC | Age: 75
End: 2020-06-22

## 2020-06-22 ENCOUNTER — NURSE ONLY (OUTPATIENT)
Dept: CARDIOLOGY CLINIC | Age: 75
End: 2020-06-22

## 2020-06-23 NOTE — TELEPHONE ENCOUNTER
Spoke with pt. She remembers on 6/21, around 1000, she was laying in bed having her  put in her eye gtts. She sat up and felt the room spinning. Denies any other symptoms. She laid back down and the episode resolved within 1-2 hrs. Offered appt with [facility] this week at [facility], but pt refused as it is too far for her to drive. Scheduled her with [facility] on 7/6 and advised to call before then if she has another episode of dizziness or any other symptoms. Pt verbalized understanding.

## 2020-06-25 NOTE — PROGRESS NOTES
Notes recorded by Pauline Oliveira MD on 6/25/2020 at 6:54 PM EDT  High grade AV block. I should see her.

## 2020-06-27 NOTE — PROGRESS NOTES
OV 7/7  Cl  8/3    Remote interrogation of implanted cardiac event monitor shows normal function. ILR implanted for stroke. No AF recorded to date. 6/21-symptomatic pause recorded showing high grade avb. Ov 7/7 w/ last to discuss. Last interrogation 6/22/20. No new arrhythmias/events recorded. Follow up 1 month via carelink.

## 2020-06-29 ENCOUNTER — NURSE ONLY (OUTPATIENT)
Dept: CARDIOLOGY CLINIC | Age: 75
End: 2020-06-29
Payer: MEDICARE

## 2020-06-29 PROCEDURE — G2066 INTER DEVC REMOTE 30D: HCPCS | Performed by: INTERNAL MEDICINE

## 2020-06-29 PROCEDURE — 93298 REM INTERROG DEV EVAL SCRMS: CPT | Performed by: INTERNAL MEDICINE

## 2020-07-02 NOTE — PROGRESS NOTES
infarctions of both occipital lobes ([facility] [address] Colon polyp, Gallstones, GERD (gastroesophageal reflux disease), Hypertension, Kidney stone, Osteoporoses, Pregnancies, Shingles, Tubular adenoma nos, and UTI (urinary tract infection). Surgical History:   has a past surgical history that includes Cholecystectomy (1988); bladder repair (9/09); Colonoscopy (2008,3/9/2017); Hysterectomy; Appendectomy; Upper gastrointestinal endoscopy (2008); and Breast surgery (Left, 10/26/2017). Social History:   reports that she has never smoked. She has never used smokeless tobacco. She reports current alcohol use of about 1.0 standard drinks of alcohol per week. She reports that she does not use drugs. Family History:  family history includes Breast Cancer in her mother; Cancer (age of onset: 61) in her sister; Cancer (age of onset: 67) in her mother; Cirrhosis in her mother; Coronary Art Dis in her mother and sister; Elevated Lipids in her sister; Heart Disease in her mother and sister; High Blood Pressure in her sister and sister; High Cholesterol in her sister; Other in her father and sister; Stroke in her father and sister. Home Medications:  Outpatient Encounter Medications as of 7/7/2020   Medication Sig Dispense Refill    amLODIPine (NORVASC) 5 MG tablet Take 1 tablet by mouth daily 90 tablet 3    atorvastatin (LIPITOR) 40 MG tablet Take 1 tablet by mouth nightly 90 tablet 3    omeprazole (PRILOSEC) 10 MG delayed release capsule Take 1 capsule by mouth once daily 90 capsule 0    sertraline (ZOLOFT) 50 MG tablet Take 1 tablet by mouth daily 90 tablet 3    anastrozole (ARIMIDEX) 1 MG tablet Take 1 mg by mouth daily      aspirin 81 MG chewable tablet Take 1 tablet by mouth daily 30 tablet 3    Psyllium (METAMUCIL PO) Take by mouth      calcium carbonate 600 MG TABS tablet Take 1 tablet by mouth daily      Multiple Vitamin (MULTIVITAMIN PO) Take  by mouth.       [DISCONTINUED] vitamin D3 (CHOLECALCIFEROL) 10 MCG (400 UNIT) TABS tablet Take 400 Units by mouth daily      [DISCONTINUED] Turmeric 500 MG CAPS Take by mouth       No facility-administered encounter medications on file as of 7/7/2020. Allergies:  Sulfa antibiotics; Sulfamethoxazole-trimethoprim; and Vicodin [hydrocodone-acetaminophen]     Review of Systems   Constitutional: Negative. HENT: Negative. Eyes: Negative. Respiratory: Negative. Cardiovascular: Negative. Gastrointestinal: Negative. Genitourinary: Negative. Musculoskeletal: Negative. Skin: Negative. Neurological: Negative. Hematological: Negative. Psychiatric/Behavioral: Negative. /68 (Site: Right Upper Arm, Position: Supine, Cuff Size: Medium Adult)   Pulse 72   Temp 98.6 °F (37 °C)   Ht 5' 3\" (1.6 m)   Wt 145 lb 9.6 oz (66 kg)   BMI 25.79 kg/m²     ECG 7/7/20, personally reviewed, SR    Echo 9/9/19   Summary   Left ventricular cavity size is decreased. There is mild left ventricular   hypertrophy. Overall left ventricular systolic function appears normal with   an ejection fraction of 60-65%. No regional wall motion abnormalities are   noted. Indeterminate diastolic function. Mild tricuspid regurgitation. Estimated pulmonary artery systolic pressure is at 29 mmHg assuming a right   atrial pressure of 3 mmHg. A bubble study was performed and fails to show   evidence of right to left shunting. Objective:  Physical Exam   Constitutional: She is oriented to person, place, and time. She appears well-developed and well-nourished. HENT:   Head: Normocephalic and atraumatic. Eyes: Pupils are equal, round, and reactive to light. Neck: Normal range of motion. Cardiovascular: Normal rate, regular rhythm and normal heart sounds. Pulmonary/Chest: Effort normal and breath sounds normal.   Abdominal: Soft. No tenderness. Musculoskeletal: Normal range of motion. She exhibits no edema.    Neurological: She is alert and oriented to person, place, and time.   Skin: Skin is warm and dry. Psychiatric: She has a normal mood and affect. Assessment:  1. CVA- In 1/2018 and 9/2019. Normal echo. CTA shows some cerebrovascular disease. No evidence of AF seen during hospitalization. Not interested in 30 day monitor. Discussed risks and benefits of ILR for long term monitoring. 2. HTN    3. AV block- 4 sec episode seen on ILR. With the prolonged symptoms and nausea, this could have been a vasodepressor episode. Plan:  1. Notify our office for any further episodes of dizziness or syncope  2. Continue with remote home transmissions every month  3. Follow up in 6 months with device check prior    ILinn RN, am scribing for and in the presence of Dr. Irvin Pfeiffer. 07/07/20 1:58 PM  Linn Nicole RN    I, Dr. Irvin Pfeiffer, personally performed the services described in this documentation as scribed by Linn Nicole RN in my presence, and it is both accurate and complete.       Irvin Pfeiffer M.D.

## 2020-07-07 ENCOUNTER — OFFICE VISIT (OUTPATIENT)
Dept: CARDIOLOGY CLINIC | Age: 75
End: 2020-07-07
Payer: MEDICARE

## 2020-07-07 ENCOUNTER — NURSE ONLY (OUTPATIENT)
Dept: CARDIOLOGY CLINIC | Age: 75
End: 2020-07-07

## 2020-07-07 VITALS
SYSTOLIC BLOOD PRESSURE: 124 MMHG | BODY MASS INDEX: 25.8 KG/M2 | WEIGHT: 145.6 LBS | HEART RATE: 72 BPM | HEIGHT: 63 IN | TEMPERATURE: 98.6 F | DIASTOLIC BLOOD PRESSURE: 68 MMHG

## 2020-07-07 PROCEDURE — 1090F PRES/ABSN URINE INCON ASSESS: CPT | Performed by: INTERNAL MEDICINE

## 2020-07-07 PROCEDURE — G8417 CALC BMI ABV UP PARAM F/U: HCPCS | Performed by: INTERNAL MEDICINE

## 2020-07-07 PROCEDURE — 1123F ACP DISCUSS/DSCN MKR DOCD: CPT | Performed by: INTERNAL MEDICINE

## 2020-07-07 PROCEDURE — 1036F TOBACCO NON-USER: CPT | Performed by: INTERNAL MEDICINE

## 2020-07-07 PROCEDURE — G8399 PT W/DXA RESULTS DOCUMENT: HCPCS | Performed by: INTERNAL MEDICINE

## 2020-07-07 PROCEDURE — 4040F PNEUMOC VAC/ADMIN/RCVD: CPT | Performed by: INTERNAL MEDICINE

## 2020-07-07 PROCEDURE — G8427 DOCREV CUR MEDS BY ELIG CLIN: HCPCS | Performed by: INTERNAL MEDICINE

## 2020-07-07 PROCEDURE — 99214 OFFICE O/P EST MOD 30 MIN: CPT | Performed by: INTERNAL MEDICINE

## 2020-08-03 ENCOUNTER — NURSE ONLY (OUTPATIENT)
Dept: CARDIOLOGY CLINIC | Age: 75
End: 2020-08-03
Payer: MEDICARE

## 2020-08-03 PROCEDURE — G2066 INTER DEVC REMOTE 30D: HCPCS | Performed by: INTERNAL MEDICINE

## 2020-08-03 PROCEDURE — 93298 REM INTERROG DEV EVAL SCRMS: CPT | Performed by: INTERNAL MEDICINE

## 2020-08-03 NOTE — LETTER
[address] [phone]  Luige Sancho [address] [facility] [address] [facility]    Pacemaker/Defibrillator Clinic          08/03/20        [address] [place] [address] [id number] [place] [address]        Dear Kristie Ambriz    This letter is to inform you that we received the transmission from your monitor at home that checks your implanted heart device. The next date your monitor will automatically transmit will be 9/8. No new arrhythmias. .  If your report needs attention we will notify you. Your device and monitor are wireless and most transmit cellularly, but please periodically check your monitor is still plugged in to the electrical outlet. If you still use the telephone land line to send please ensure the connection to the phone tobin is secure. This will help to ensure successful automatic transmissions in the future. Also, the monitor needs to be close to you while sleeping at night. Please be aware that the remote device transmission sites are periodically monitored only during regular business hours during which simultaneous in-office device clinics are being run. If your transmission requires attention, we will contact you as soon as possible. Thank you.             José Miguel Cooper

## 2020-09-03 RX ORDER — ANASTROZOLE 1 MG/1
1 TABLET ORAL DAILY
Qty: 30 TABLET | Refills: 1 | Status: SHIPPED | OUTPATIENT
Start: 2020-09-03 | End: 2020-09-16

## 2020-09-03 RX ORDER — AMLODIPINE BESYLATE 5 MG/1
5 TABLET ORAL DAILY
Qty: 90 TABLET | Refills: 3 | Status: SHIPPED | OUTPATIENT
Start: 2020-09-03 | End: 2021-08-09

## 2020-09-03 RX ORDER — ATORVASTATIN CALCIUM 40 MG/1
40 TABLET, FILM COATED ORAL NIGHTLY
Qty: 90 TABLET | Refills: 3 | Status: SHIPPED | OUTPATIENT
Start: 2020-09-03 | End: 2021-06-20

## 2020-09-03 NOTE — TELEPHONE ENCOUNTER
----- Message from Brianna Ayala sent at 9/3/2020  1:20 PM EDT -----  Subject: Refill Request    QUESTIONS  Name of Medication? atorvastatin (LIPITOR) 40 MG tablet  Patient-reported dosage and instructions? 40 MG  How many days do you have left? 1  [facility]erred [facility]? [facility]  Pharmacy phone number (if available)? [phone]  Additional Information for Provider?   ---------------------------------------------------------------------------  --------------  [address]  What is the best way for the office to contact you? OK to leave message on   voicemail  Preferred Call Back Phone Number?  [phone]

## 2020-09-03 NOTE — TELEPHONE ENCOUNTER
----- Message from Chris Phillips sent at 9/3/2020  1:18 PM EDT -----  Subject: Refill Request    QUESTIONS  Name of Medication? sertraline (ZOLOFT) 50 MG tablet  Patient-reported dosage and instructions? 50 MG  How many days do you have left? 1  Preferred [facility]acy? [facility]  Pharmacy phone number (if available)? [phone]  Additional Information for Provider?   ---------------------------------------------------------------------------  --------------  [address]  What is the best way for the office to contact you? OK to leave message on   voicemail  Preferred Call Back Phone Number?  [phone]

## 2020-09-03 NOTE — TELEPHONE ENCOUNTER
----- Message from [facility] sent at 9/3/2020  1:24 PM EDT -----  Subject: Message to Provider    QUESTIONS  Information for Provider? Patient needs medication refilled anastrozole   (ARIMIDEX) 1 MG tablet and sent to [facility] [address] [id number]  ---------------------------------------------------------------------------  --------------  CALL BACK INFO  What is the best way for the office to contact you? OK to leave message on   voicemail  Preferred Call Back Phone Number? [phone]  ---------------------------------------------------------------------------  --------------  SCRIPT ANSWERS  Relationship to Patient? Other  Representative Name? [facility]  Is the Representative on the appropriate HIPAA document in Epic?  Yes

## 2020-09-08 ENCOUNTER — NURSE ONLY (OUTPATIENT)
Dept: CARDIOLOGY CLINIC | Age: 75
End: 2020-09-08
Payer: MEDICARE

## 2020-09-08 PROCEDURE — 93298 REM INTERROG DEV EVAL SCRMS: CPT | Performed by: INTERNAL MEDICINE

## 2020-09-08 PROCEDURE — G2066 INTER DEVC REMOTE 30D: HCPCS | Performed by: INTERNAL MEDICINE

## 2020-09-08 NOTE — LETTER
[address] [facility] [phone]  [address] [facility]  Jayjay Reid [address] [place] [address]    Pacemaker/Defibrillator Clinic          09/08/20        [address] [place] [address]        Dear Lee Godfrey    This letter is to inform you that we received the transmission from your monitor at home that checks your implanted heart device. The next date your monitor will automatically transmit will be 10/14. No new arrhythmias/events recorded. .  If your report needs attention we will notify you. Your device and monitor are wireless and most transmit cellularly, but please periodically check your monitor is still plugged in to the electrical outlet. If you still use the telephone land line to send please ensure the connection to the phone tobin is secure. This will help to ensure successful automatic transmissions in the future. Also, the monitor needs to be close to you while sleeping at night. Please be aware that the remote device transmission sites are periodically monitored only during regular business hours during which simultaneous in-office device clinics are being run. If your transmission requires attention, we will contact you as soon as possible. Thank you.             José Miguel [address]
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
## 2020-09-16 RX ORDER — ANASTROZOLE 1 MG/1
1 TABLET ORAL DAILY
Qty: 60 TABLET | Refills: 5 | Status: SHIPPED | OUTPATIENT
Start: 2020-09-16 | End: 2020-09-17 | Stop reason: SDUPTHER

## 2020-09-17 RX ORDER — ANASTROZOLE 1 MG/1
1 TABLET ORAL DAILY
Qty: 180 TABLET | Refills: 3 | Status: SHIPPED | OUTPATIENT
Start: 2020-09-17 | End: 2021-10-24

## 2020-10-14 ENCOUNTER — NURSE ONLY (OUTPATIENT)
Dept: CARDIOLOGY CLINIC | Age: 75
End: 2020-10-14
Payer: MEDICARE

## 2020-10-14 PROCEDURE — G2066 INTER DEVC REMOTE 30D: HCPCS | Performed by: INTERNAL MEDICINE

## 2020-10-14 PROCEDURE — 93298 REM INTERROG DEV EVAL SCRMS: CPT | Performed by: INTERNAL MEDICINE

## 2020-10-14 NOTE — LETTER
[address] [facility] [phone]  Luige Sancho [address] [facility]    Pacemaker/Defibrillator Clinic          10/15/20        [address] [place] [address] [id number] [place] [address]        Dear Priyanka Sayres    This letter is to inform you that we received the transmission from your monitor at home that checks your implanted heart device. The next date your monitor will automatically transmit will be 11/18. No AFib recorded to date. If your report needs attention we will notify you. Your device and monitor are wireless and most transmit cellularly, but please periodically check your monitor is still plugged in to the electrical outlet. If you still use the telephone land line to send please ensure the connection to the phone tobin is secure. This will help to ensure successful automatic transmissions in the future. Also, the monitor needs to be close to you while sleeping at night. Please be aware that the remote device transmission sites are periodically monitored only during regular business hours during which simultaneous in-office device clinics are being run. If your transmission requires attention, we will contact you as soon as possible. Thank you.             [facility]

## 2020-11-18 ENCOUNTER — NURSE ONLY (OUTPATIENT)
Dept: CARDIOLOGY CLINIC | Age: 75
End: 2020-11-18
Payer: MEDICARE

## 2020-11-18 PROCEDURE — G2066 INTER DEVC REMOTE 30D: HCPCS | Performed by: INTERNAL MEDICINE

## 2020-11-18 PROCEDURE — 93298 REM INTERROG DEV EVAL SCRMS: CPT | Performed by: INTERNAL MEDICINE

## 2020-11-18 NOTE — PROGRESS NOTES
Remote interrogation of implanted cardiac event monitor shows normal function. ILR implanted for stroke. No AF recorded to date.  6/21/20-symptomatic pause recorded showing high grade avb. Ov 7/7/20-addressed pause. No  Arrhythmias/pauses recorded. Follow up 1 month via carelink.

## 2020-11-18 NOTE — LETTER
[address] [facility] [employer] [phone]  Luige Sancho 10 [address] [facility] [phone]    Pacemaker/Defibrillator Clinic          11/19/20        [address] [place] [address]        Dear Tyler Sullivan    This letter is to inform you that we received the transmission from your monitor at home that checks your implanted heart device. The next date your monitor will automatically transmit will be 12/23. No  arrhythmias or pauses recorded. .  If your report needs attention we will notify you. Your device and monitor are wireless and most transmit cellularly, but please periodically check your monitor is still plugged in to the electrical outlet. If you still use the telephone land line to send please ensure the connection to the phone tobin is secure. This will help to ensure successful automatic transmissions in the future. Also, the monitor needs to be close to you while sleeping at night. Please be aware that the remote device transmission sites are periodically monitored only during regular business hours during which simultaneous in-office device clinics are being run. If your transmission requires attention, we will contact you as soon as possible. Thank you.             José Miguel 79

## 2020-11-24 ENCOUNTER — HOSPITAL ENCOUNTER (OUTPATIENT)
Dept: MAMMOGRAPHY | Age: 75
Discharge: HOME | End: 2020-11-24
Payer: MEDICARE

## 2020-11-24 PROCEDURE — 77063 BREAST TOMOSYNTHESIS BI: CPT

## 2020-12-04 RX ORDER — OMEPRAZOLE 10 MG/1
CAPSULE, DELAYED RELEASE ORAL
Qty: 90 CAPSULE | Refills: 0 | Status: SHIPPED | OUTPATIENT
Start: 2020-12-04

## 2021-01-04 NOTE — PROGRESS NOTES
[address]   Electrophysiology  Office Visit  Date: 1/12/2021    Chief Complaint   Patient presents with    Cerebrovascular Accident       Cardiac HX: Avril Sanchez is a 66 y.o. woman with a h/o HTN, CVA, (2018), presented 9/6/19 with L leg/arm weakness, MRI confirmed R thalamic acute infarct, CTA of head/neck showed repro vascular disease, reveals a 30-day monitor post hospital stay however did wear a 24-hour Holter in 2018 following her first stroke that showed no AF/AFL, status post ILR on 12/18/2019. Interval History/HPI: Patient is here to follow-up for ILR implanted after a stroke looking for atrial arrhythmias. Review of device today shows one 5 second episode of an AT, no AF/AFL. She has not felt any heart racing or palpitations. Her eyes were affected by her stroke and she still not able to drive despite having cataract surgery. Patient had seen Dr. Michaeleen Goltz in July 2020 at which time the device recorded an episode of high grade AV block on 6/21/2020 at 9:30 AM lasting for seconds. Patient had been lying in bed at that time and felt the room spinning, states that the symptoms lasted between 1 to 2 hours and that her blood sugars dropped as well. She has not had any further of the symptoms. She denies chest pain, shortness of breath, PND, orthopnea or lower extremity edema.     Home medications:   Current Outpatient Medications on File Prior to Visit   Medication Sig Dispense Refill    omeprazole (PRILOSEC) 10 MG delayed release capsule Take 1 capsule by mouth once daily 90 capsule 0    anastrozole (ARIMIDEX) 1 MG tablet Take 1 tablet by mouth daily 180 tablet 3    atorvastatin (LIPITOR) 40 MG tablet Take 1 tablet by mouth nightly 90 tablet 3    amLODIPine (NORVASC) 5 MG tablet Take 1 tablet by mouth daily 90 tablet 3    sertraline (ZOLOFT) 50 MG tablet Take 1 tablet by mouth daily 90 tablet 3    aspirin 81 MG chewable tablet Take 1 tablet by mouth daily 30 tablet 3    Psyllium (METAMUCIL PO) Take by mouth      calcium carbonate 600 MG TABS tablet Take 1 tablet by mouth daily      Multiple Vitamin (MULTIVITAMIN PO) Take  by mouth. No current facility-administered medications on file prior to visit. Past Medical History:   Diagnosis Date    Vernon's esophagus     Cancer [facility])     breast    Cerebrovascular accident (CVA) due to embolism of posterior cerebral artery with infarctions of both occipital lobes ([address] 1/10/2018    Colon polyp 1/15/2016    Gallstones     GERD (gastroesophageal reflux disease)     Hypertension     Kidney stone     Osteoporoses     Pregnancies     2- vaginal deliveries- 2    Shingles     Tubular adenoma nos     UTI (urinary tract infection)         Past Surgical History:   Procedure Laterality Date    APPENDECTOMY      BLADDER REPAIR  9/09    cah    BREAST SURGERY Left 10/26/2017    Left breastpartial mastectomy, needle localization, lymph node dissection    CHOLECYSTECTOMY  1988    COLONOSCOPY  2008,3/9/2017    polyp    HYSTERECTOMY      UPPER GASTROINTESTINAL ENDOSCOPY  2008       Allergies   Allergen Reactions    Sulfa Antibiotics     Sulfamethoxazole-Trimethoprim Other (See Comments)    Vicodin [Hydrocodone-Acetaminophen] Nausea And Vomiting       Social History:  Reviewed. reports that she has never smoked. She has never used smokeless tobacco. She reports current alcohol use of about 1.0 standard drinks of alcohol per week. She reports that she does not use drugs. Family History:  Reviewed. family history includes Breast Cancer in her mother; Cancer (age of onset: 61) in her sister; Cancer (age of onset: 67) in her mother; Cirrhosis in her mother; Coronary Art Dis in her mother and sister; Elevated Lipids in her sister; Heart Disease in her mother and sister; High Blood Pressure in her sister and sister; High Cholesterol in her sister; Other in her father and sister; Stroke in her father and sister.      Review of System:    · Constitutional: No fevers, chills. · Eyes: No visual changes or diplopia. No scleral icterus. · ENT: No Headaches. No mouth sores or sore throat. · Cardiovascular: No for chest pain, No for dyspnea on exertion, No for palpitations or No for loss of consciousness. No cough, hemoptysis, No for pleuritic pain, or phlebitis. · Respiratory: No for cough or wheezing. No hematemesis. · Gastrointestinal: No abdominal pain, blood in stools. · Genitourinary: No dysuria, or hematuria. · Musculoskeletal: No gait disturbance,    · Integumentary: No rash or pruritis. · Neurological: No headache, change in muscle strength, numbness or tingling. · Psychiatric: No anxiety, or depression. · Endocrine: No temperature intolerance. No excessive thirst, fluid intake, or urination. · Hem/Lymph: No abnormal bruising or bleeding, blood clots or swollen lymph nodes. · Allergic/Immunologic: No nasal congestion or hives. Physical Examination:  Vitals:    01/12/21 1312   Pulse: 80         Wt Readings from Last 3 Encounters:   07/07/20 145 lb 9.6 oz (66 kg)   06/12/20 145 lb (65.8 kg)   02/24/20 141 lb 9.6 oz (64.2 kg)       · Constitutional: Oriented. No distress. · Head: Normocephalic and atraumatic. · Mouth/Throat: Oropharynx is clear and moist.   · Eyes: Conjunctivae clear without jaunduice. PERRL. · Neck: Neck supple. No rigidity. No JVD present. · Cardiovascular: Normal rate, regular rhythm, S1&S2. · Pulmonary/Chest: Bilateral respiratory sounds. No wheezes, No rhonchi. · Abdominal: Soft. Bowel sounds present. No distension, No tenderness. · Musculoskeletal: No tenderness. No edema    · Lymphadenopathy: Has no cervical adenopathy. · Neurological: Alert and oriented. Cranial nerve appears intact, No Gross deficit   · Skin: Skin is warm and dry. No rash noted. · Psychiatric: Has a normal mood, affect and behavior     Labs:  Reviewed.    No results for input(s): NA, K, CL, CO2, PHOS, BUN, CREATININE in the last 72 hours. Invalid input(s): CA,  TSH  No results for input(s): WBC, HGB, HCT, MCV, PLT in the last 72 hours. Lab Results   Component Value Date    CKTOTAL 53 12/09/2019    TROPONINI <0.01 09/06/2019     No results found for: BNP  Lab Results   Component Value Date    PROTIME 11.0 09/06/2019    INR 0.96 09/06/2019     Lab Results   Component Value Date    CHOL 90 12/09/2019    HDL 44 12/09/2019    HDL 45 09/30/2011    TRIG 112 12/09/2019       ECG: Personally reviewed: NSR, HR 80, , QRS 84, QTc 401    ECHO: 9/9/2019  Summary   Left ventricular cavity size is decreased. There is mild left ventricular   hypertrophy. Overall left ventricular systolic function appears normal with   an ejection fraction of 60-65%. No regional wall motion abnormalities are   noted. Indeterminate diastolic function. Mild tricuspid regurgitation. Estimated pulmonary artery systolic pressure is at 29 mmHg assuming a right   atrial pressure of 3 mmHg.  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 09/08/2019    Cystitis 09/06/2019    Lacunar infarct, acute ([address] 09/06/2019    Hematuria 01/04/2019    Costochondritis 09/07/2018    Visual field defect 03/02/2018    Cervical myelopathy ([address] 03/02/2018    Cerebrovascular accident (CVA) due to embolism of posterior cerebral artery with infarctions of both occipital lobes ([address] 01/10/2018    Malignant neoplasm of upper-outer quadrant of left female breast ([address]     Acquired contour deformity of breast     Depression 11/18/2016    Colon polyp 01/15/2016    Lumbar radiculopathy 11/06/2012    Osteoporosis 09/30/2011    GERD (gastroesophageal reflux disease) 10/14/2010    Vernon esophagus 10/14/2010    Balance disorder 10/14/2010        Assessment:   1. Cerebrovascular accident (CVA) due to embolism of posterior cerebral artery with infarctions of both occipital lobes (HCC)    2. Status post placement of implantable loop recorder      Cardiac HX: Lilian Martinez is a 66 y.o. woman with a h/o HTN, CVA, (2018), presented 9/6/19 with L leg/arm weakness, MRI confirmed R thalamic acute infarct, CTA of head/neck showed repro vascular disease, reveals a 30-day monitor post hospital stay however did wear a 24-hour Holter in 2018 following her first stroke that showed no AF/AFL, status post ILR on 12/18/2019. CVA  - S/p ILR  - Device check today shows 1 AT episode lasting 5 seconds in length with the patient was asymptomatic  - Continue monthly downloads  - F/u in 6 months with EP  - ECG ordered and results personally reviewed       EF of 41-72%  No systolic HF  No known CAD  No known AF  No Tobacco use. All questions and concerns were addressed to the patient/family. Alternatives to my treatment were discussed. The note was completed using EMR. Every effort was made to ensure accuracy; however, inadvertent computerized transcription errors may be present. Patient received education regarding their diagnosis, treatment and medications while in the office today.       Aneesh Davies [address]

## 2021-01-12 ENCOUNTER — NURSE ONLY (OUTPATIENT)
Dept: CARDIOLOGY CLINIC | Age: 76
End: 2021-01-12
Payer: MEDICARE

## 2021-01-12 ENCOUNTER — OFFICE VISIT (OUTPATIENT)
Dept: CARDIOLOGY CLINIC | Age: 76
End: 2021-01-12
Payer: MEDICARE

## 2021-01-12 VITALS — HEART RATE: 80 BPM | BODY MASS INDEX: 25.79 KG/M2 | HEIGHT: 63 IN

## 2021-01-12 DIAGNOSIS — Z95.818 STATUS POST PLACEMENT OF IMPLANTABLE LOOP RECORDER: ICD-10-CM

## 2021-01-12 DIAGNOSIS — Z45.09 ENCOUNTER FOR LOOP RECORDER CHECK: ICD-10-CM

## 2021-01-12 DIAGNOSIS — I63.439 CEREBROVASCULAR ACCIDENT (CVA) DUE TO EMBOLISM OF POSTERIOR CEREBRAL ARTERY WITH INFARCTIONS OF BOTH OCCIPITAL LOBES (HCC): Primary | ICD-10-CM

## 2021-01-12 DIAGNOSIS — I63.439 CEREBROVASCULAR ACCIDENT (CVA) DUE TO EMBOLISM OF POSTERIOR CEREBRAL ARTERY WITH INFARCTIONS OF BOTH OCCIPITAL LOBES (HCC): ICD-10-CM

## 2021-01-12 PROCEDURE — G8399 PT W/DXA RESULTS DOCUMENT: HCPCS | Performed by: NURSE PRACTITIONER

## 2021-01-12 PROCEDURE — G8417 CALC BMI ABV UP PARAM F/U: HCPCS | Performed by: NURSE PRACTITIONER

## 2021-01-12 PROCEDURE — 93291 INTERROG DEV EVAL SCRMS IP: CPT | Performed by: INTERNAL MEDICINE

## 2021-01-12 PROCEDURE — 93000 ELECTROCARDIOGRAM COMPLETE: CPT | Performed by: NURSE PRACTITIONER

## 2021-01-12 PROCEDURE — 1036F TOBACCO NON-USER: CPT | Performed by: NURSE PRACTITIONER

## 2021-01-12 PROCEDURE — 1090F PRES/ABSN URINE INCON ASSESS: CPT | Performed by: NURSE PRACTITIONER

## 2021-01-12 PROCEDURE — G8427 DOCREV CUR MEDS BY ELIG CLIN: HCPCS | Performed by: NURSE PRACTITIONER

## 2021-01-12 PROCEDURE — 1123F ACP DISCUSS/DSCN MKR DOCD: CPT | Performed by: NURSE PRACTITIONER

## 2021-01-12 PROCEDURE — 99213 OFFICE O/P EST LOW 20 MIN: CPT | Performed by: NURSE PRACTITIONER

## 2021-01-12 PROCEDURE — G8482 FLU IMMUNIZE ORDER/ADMIN: HCPCS | Performed by: NURSE PRACTITIONER

## 2021-01-12 PROCEDURE — 4040F PNEUMOC VAC/ADMIN/RCVD: CPT | Performed by: NURSE PRACTITIONER

## 2021-01-19 NOTE — PROGRESS NOTES
Patient comes into the office today for a device check and ov w/NPFW. Interrogation by [employer] rep. Battery status good  Presenting NSR @  bpm (occasional ectopy)  1 Tachy episode on 10/9/2020 lasting 5 sec w/Max rate of 188 bpm.    No changes made  See Pace[employer] report under the Cardiology tab. Patient will follow up in 1 month via carelink.

## 2021-02-15 ENCOUNTER — HOSPITAL ENCOUNTER (OUTPATIENT)
Dept: GENERAL RADIOLOGY | Age: 76
Discharge: HOME | End: 2021-02-15
Payer: MEDICARE

## 2021-02-15 DIAGNOSIS — M81.0 OSTEOPOROSIS WITHOUT CURRENT PATHOLOGICAL FRACTURE, UNSPECIFIED OSTEOPOROSIS TYPE: ICD-10-CM

## 2021-02-15 DIAGNOSIS — C50.412 MALIGNANT NEOPLASM OF UPPER-OUTER QUADRANT OF LEFT FEMALE BREAST, UNSPECIFIED ESTROGEN RECEPTOR STATUS (HCC): ICD-10-CM

## 2021-02-15 PROCEDURE — 77080 DXA BONE DENSITY AXIAL: CPT

## 2021-03-24 ENCOUNTER — NURSE ONLY (OUTPATIENT)
Dept: CARDIOLOGY CLINIC | Age: 76
End: 2021-03-24
Payer: MEDICARE

## 2021-03-24 DIAGNOSIS — I67.9 CEREBRAL VASCULAR DISEASE: ICD-10-CM

## 2021-03-24 DIAGNOSIS — Z45.09 ENCOUNTER FOR LOOP RECORDER CHECK: ICD-10-CM

## 2021-03-24 DIAGNOSIS — I63.439 CEREBROVASCULAR ACCIDENT (CVA) DUE TO EMBOLISM OF POSTERIOR CEREBRAL ARTERY WITH INFARCTIONS OF BOTH OCCIPITAL LOBES (HCC): ICD-10-CM

## 2021-03-24 PROCEDURE — G2066 INTER DEVC REMOTE 30D: HCPCS | Performed by: INTERNAL MEDICINE

## 2021-03-24 NOTE — LETTER
[address] [facility] [phone]  [address] [facility]  Juliana Richey [address] [place] [address]    Pacemaker/Defibrillator Clinic          03/26/21        [address] [place] [address] [id number] [place] [address]        Dear Ruthie Navarro    This letter is to inform you that we received the transmission from your monitor at home that checks your implanted heart device. The next date your monitor will automatically transmit will be 4/28. No AFib recorded to date. .  If your report needs attention we will notify you. Your device and monitor are wireless and most transmit cellularly, but please periodically check your monitor is still plugged in to the electrical outlet. If you still use the telephone land line to send please ensure the connection to the phone tobin is secure. This will help to ensure successful automatic transmissions in the future. Also, the monitor needs to be close to you while sleeping at night. Please be aware that the remote device transmission sites are periodically monitored only during regular business hours during which simultaneous in-office device clinics are being run. If your transmission requires attention, we will contact you as soon as possible. Thank you.             [facility]

## 2021-03-24 NOTE — PROGRESS NOTES
Remote interrogation of implanted cardiac event monitor shows normal function. ILR implanted for stroke. No AF recorded to date.  6/21/20-symptomatic pause recorded showing high grade avb. Ov 7/7/20-addressed pause. No symptom episodes recorded. Tachy 22-Feb-2021 19:16-egm shows pAT x 4 sec. Follow up 1 month via [employer].

## 2021-03-26 PROCEDURE — 93298 REM INTERROG DEV EVAL SCRMS: CPT | Performed by: INTERNAL MEDICINE

## 2021-04-28 ENCOUNTER — NURSE ONLY (OUTPATIENT)
Dept: CARDIOLOGY CLINIC | Age: 76
End: 2021-04-28
Payer: MEDICARE

## 2021-04-28 DIAGNOSIS — I63.439 CEREBROVASCULAR ACCIDENT (CVA) DUE TO EMBOLISM OF POSTERIOR CEREBRAL ARTERY WITH INFARCTIONS OF BOTH OCCIPITAL LOBES (HCC): ICD-10-CM

## 2021-04-28 DIAGNOSIS — Z45.09 ENCOUNTER FOR LOOP RECORDER CHECK: ICD-10-CM

## 2021-05-01 PROCEDURE — G2066 INTER DEVC REMOTE 30D: HCPCS | Performed by: INTERNAL MEDICINE

## 2021-05-01 PROCEDURE — 93298 REM INTERROG DEV EVAL SCRMS: CPT | Performed by: INTERNAL MEDICINE

## 2021-06-02 ENCOUNTER — NURSE ONLY (OUTPATIENT)
Dept: CARDIOLOGY CLINIC | Age: 76
End: 2021-06-02
Payer: MEDICARE

## 2021-06-02 DIAGNOSIS — I63.439 CEREBROVASCULAR ACCIDENT (CVA) DUE TO EMBOLISM OF POSTERIOR CEREBRAL ARTERY WITH INFARCTIONS OF BOTH OCCIPITAL LOBES (HCC): ICD-10-CM

## 2021-06-02 DIAGNOSIS — Z45.09 ENCOUNTER FOR LOOP RECORDER CHECK: ICD-10-CM

## 2021-06-02 NOTE — LETTER
[address] [phone]  Luige Sancho [address]- [address]    Pacemaker/Defibrillator Clinic    06/03/21      [address] [place] [address]      Dear Johny Erickson    This letter is to inform you that we received the transmission from your monitor at home that checks your implanted heart device. The next date your monitor will automatically transmit will be 7/14. If your report needs attention we will notify you. Your device and monitor are wireless and most transmit cellularly, but please periodically check your monitor is still plugged in to the electrical outlet. If you still use the telephone land line to send please ensure the connection to the phone tobin is secure. This will help to ensure successful automatic transmissions in the future. Also, the monitor needs to be close to you while sleeping at night. Please be aware that the remote device transmission sites are periodically monitored only during regular business hours during which simultaneous in-office device clinics are being run. If your transmission requires attention, we will contact you as soon as possible. **PLEASE NOTE** that our [facility] policy and processes are changing to ensure a more seamless approach for all parties involved, allowing more time for our nurses to address patient issues and concerns. We will no longer be sending letters for NORMAL remote transmissions. You will be contacted by phone if your transmission requires attention (as previously done), and letters will only be sent regarding monitor disconnections or missed transmissions if you are unable to be reached by phone. Please do not be alarmed by this new process, as we will continue to contact you if your transmission report requires attention. This will be your final \"remote received\" letter.   From this point forward, the [facility] will be utilizing the no news is good news approach. As always, please feel free to contact your nurse with any questions or concerns. Thank you.     [facility]

## 2021-06-03 NOTE — PROGRESS NOTES
ILR implanted for stroke. No AF recorded to date.  6/21/20-symptomatic pause recorded showing high grade avb. Ov 7/7/20-addressed pause. Remote interrogation of implanted cardiac event monitor shows normal function. No new arrhythmias/events recorded since 04.28.2021. Follow up 1 month via carelink.

## 2021-06-09 PROCEDURE — 93298 REM INTERROG DEV EVAL SCRMS: CPT | Performed by: INTERNAL MEDICINE

## 2021-06-09 PROCEDURE — G2066 INTER DEVC REMOTE 30D: HCPCS | Performed by: INTERNAL MEDICINE

## 2021-06-20 RX ORDER — ATORVASTATIN CALCIUM 40 MG/1
40 TABLET, FILM COATED ORAL NIGHTLY
Qty: 90 TABLET | Refills: 0 | Status: SHIPPED | OUTPATIENT
Start: 2021-06-20 | End: 2022-01-18 | Stop reason: SDUPTHER

## 2021-07-06 NOTE — PROGRESS NOTES
[address]   Cardiac Consultation    Referring Provider:  Elvi Araya MD     Chief Complaint   Patient presents with    6 Month Follow-Up     HPI:  Garfield Pugh is a 66 y.o. female with PMH significant for HTN and CVA (first in 1/2018). She presented to the ED on 9/6/19 with L leg and L arm weakness. MRI confirmed right thalamic acute infarct. Echo (9/9/19) was normal with no shunting noted. No AF seen on telemetry during hospitalization. CTA of the head and neck showed some cerebrovascular disease. Pt had been unwilling to wear 30 day monitor after released from the hospital.  She did wear a Holter (1/2018 following the first stroke) that did not show any AF/AFL. S/p ILR implantation on 12/18/19 for surveillance for AF. She does still experience paresthesia on her entire left side (arm, leg, and face), but she has regained strength. She has also lost peripheral vision in her left eye after the CVA. Her device recorded an episode of high grade AVB on 6/21/20 at 0930, lasting 4 sec. She remembers around that time, she was laying in bed and she felt the room spinning. Denies any other symptoms. She laid back down and the episode resolved within 1-2 hrs. She stated her blood sugar dropped and she ate a donut and a Pepsi. During the recovery period, she felt as if she were recovering from a \"drunken stupor. \"  She did not check her BP during this event. However, she did feel nauseated during the episode. Her  states that she wasn't obviously pale and she did not become diaphoretic. Denies syncope in the past.    She is here today for a 6 mo f/u, presenting in [place] [facility] [facility]. Review of ILR interrogation today shows 2 episode of PAT on 10/9/20 and 2/22/21 lasting only 4 and 6 sec. No other arrhythmias noted. Denies complaints of palpitations, dizziness, CP, SOB, orthopnea, presyncope, or syncope. She does complain of BLE edema that just started over the past couple days. Past Medical History:   has a past medical history of Vernon's esophagus, Cancer [address] Cerebrovascular accident (CVA) due to embolism of posterior cerebral artery with infarctions of both occipital lobes ([address] Colon polyp, Gallstones, GERD (gastroesophageal reflux disease), Hypertension, Kidney stone, Osteoporoses, Pregnancies, Shingles, Tubular adenoma nos, and UTI (urinary tract infection). Surgical History:   has a past surgical history that includes Cholecystectomy (1988); bladder repair (9/09); Colonoscopy (2008,3/9/2017); Hysterectomy; Appendectomy; Upper gastrointestinal endoscopy (2008); and Breast surgery (Left, 10/26/2017). Social History:   reports that she has never smoked. She has never used smokeless tobacco. She reports current alcohol use of about 1.0 standard drinks of alcohol per week. She reports that she does not use drugs. Family History:  family history includes Breast Cancer in her mother; Cancer (age of onset: 61) in her sister; Cancer (age of onset: 67) in her mother; Cirrhosis in her mother; Coronary Art Dis in her mother and sister; Elevated Lipids in her sister; Heart Disease in her mother and sister; High Blood Pressure in her sister and sister; High Cholesterol in her sister; Other in her father and sister; Stroke in her father and sister.      Home Medications:  Outpatient Encounter Medications as of 7/7/2021   Medication Sig Dispense Refill    atorvastatin (LIPITOR) 40 MG tablet Take 1 tablet by mouth nightly 90 tablet 0    sertraline (ZOLOFT) 50 MG tablet Take 1 tablet by mouth once daily 90 tablet 0    omeprazole (PRILOSEC) 10 MG delayed release capsule Take 1 capsule by mouth once daily (Patient taking differently: Take 10 mg by mouth as needed ) 90 capsule 0    anastrozole (ARIMIDEX) 1 MG tablet Take 1 tablet by mouth daily 180 tablet 3    amLODIPine (NORVASC) 5 MG tablet Take 1 tablet by mouth daily 90 tablet 3    aspirin 81 MG chewable tablet Take 1 tablet by mouth daily 30 tablet 3    Psyllium (METAMUCIL PO) Take by mouth as needed       calcium carbonate 600 MG TABS tablet Take 1 tablet by mouth daily      Multiple Vitamin (MULTIVITAMIN PO) Take  by mouth. No facility-administered encounter medications on file as of 7/7/2021. Allergies:  Sulfa antibiotics, Sulfamethoxazole-trimethoprim, and Vicodin [hydrocodone-acetaminophen]     Review of Systems   Constitutional: Negative. HENT: Negative. Eyes: Negative. Respiratory: Negative. Cardiovascular: Negative. Gastrointestinal: Negative. Genitourinary: Negative. Musculoskeletal: Negative. Skin: Negative. Neurological: Negative. Hematological: Negative. Psychiatric/Behavioral: Negative. /60   Pulse 67   Ht 5' 3\" (1.6 m)   Wt 145 lb (65.8 kg)   BMI 25.69 kg/m²     ECG 7/7/21, personally reviewed, SR 67    Echo 9/9/19   Summary   Left ventricular cavity size is decreased. There is mild left ventricular   hypertrophy. Overall left ventricular systolic function appears normal with   an ejection fraction of 60-65%. No regional wall motion abnormalities are   noted. Indeterminate diastolic function. Mild tricuspid regurgitation. Estimated pulmonary artery systolic pressure is at 29 mmHg assuming a right   atrial pressure of 3 mmHg. A bubble study was performed and fails to show   evidence of right to left shunting. Objective:  Physical Exam   Constitutional: She is oriented to person, place, and time. She appears well-developed and well-nourished. HENT:   Head: Normocephalic and atraumatic. Eyes: Pupils are equal, round, and reactive to light. Neck: Normal range of motion. Cardiovascular: Normal rate, regular rhythm and normal heart sounds. Pulmonary/Chest: Effort normal and breath sounds normal.   Abdominal: Soft. No tenderness. Musculoskeletal: Normal range of motion. She exhibits no edema.    Neurological: She is alert and oriented to person, place,

## 2021-07-07 ENCOUNTER — NURSE ONLY (OUTPATIENT)
Dept: CARDIOLOGY CLINIC | Age: 76
End: 2021-07-07

## 2021-07-07 ENCOUNTER — OFFICE VISIT (OUTPATIENT)
Dept: CARDIOLOGY CLINIC | Age: 76
End: 2021-07-07
Payer: MEDICARE

## 2021-07-07 VITALS
HEIGHT: 63 IN | HEART RATE: 67 BPM | SYSTOLIC BLOOD PRESSURE: 128 MMHG | WEIGHT: 145 LBS | BODY MASS INDEX: 25.69 KG/M2 | DIASTOLIC BLOOD PRESSURE: 60 MMHG

## 2021-07-07 DIAGNOSIS — Z45.09 ENCOUNTER FOR LOOP RECORDER CHECK: ICD-10-CM

## 2021-07-07 DIAGNOSIS — I67.9 CEREBRAL VASCULAR DISEASE: Primary | ICD-10-CM

## 2021-07-07 DIAGNOSIS — I10 ESSENTIAL HYPERTENSION: ICD-10-CM

## 2021-07-07 PROCEDURE — 93000 ELECTROCARDIOGRAM COMPLETE: CPT | Performed by: INTERNAL MEDICINE

## 2021-07-07 PROCEDURE — 1036F TOBACCO NON-USER: CPT | Performed by: INTERNAL MEDICINE

## 2021-07-07 PROCEDURE — G8427 DOCREV CUR MEDS BY ELIG CLIN: HCPCS | Performed by: INTERNAL MEDICINE

## 2021-07-07 PROCEDURE — 4040F PNEUMOC VAC/ADMIN/RCVD: CPT | Performed by: INTERNAL MEDICINE

## 2021-07-07 PROCEDURE — G8399 PT W/DXA RESULTS DOCUMENT: HCPCS | Performed by: INTERNAL MEDICINE

## 2021-07-07 PROCEDURE — G8417 CALC BMI ABV UP PARAM F/U: HCPCS | Performed by: INTERNAL MEDICINE

## 2021-07-07 PROCEDURE — 1123F ACP DISCUSS/DSCN MKR DOCD: CPT | Performed by: INTERNAL MEDICINE

## 2021-07-07 PROCEDURE — 99214 OFFICE O/P EST MOD 30 MIN: CPT | Performed by: INTERNAL MEDICINE

## 2021-07-07 PROCEDURE — 1090F PRES/ABSN URINE INCON ASSESS: CPT | Performed by: INTERNAL MEDICINE

## 2021-07-07 NOTE — PROGRESS NOTES
Device check and ov w/JMB  Normal device function of ILR  Battery good  1 Tachy episode on 2/22/21 x 4 secs. V rates of 188 bpm  Presenting NSR @ 75-80 bpm  0 changes  See P[employer] report under the Cardiology tab. Follow up in 1 month via [employer].

## 2021-07-14 ENCOUNTER — NURSE ONLY (OUTPATIENT)
Dept: CARDIOLOGY CLINIC | Age: 76
End: 2021-07-14
Payer: MEDICARE

## 2021-07-14 DIAGNOSIS — I63.439 CEREBROVASCULAR ACCIDENT (CVA) DUE TO EMBOLISM OF POSTERIOR CEREBRAL ARTERY WITH INFARCTIONS OF BOTH OCCIPITAL LOBES (HCC): ICD-10-CM

## 2021-07-14 DIAGNOSIS — Z45.09 ENCOUNTER FOR LOOP RECORDER CHECK: ICD-10-CM

## 2021-07-14 PROCEDURE — 93298 REM INTERROG DEV EVAL SCRMS: CPT | Performed by: INTERNAL MEDICINE

## 2021-07-14 PROCEDURE — G2066 INTER DEVC REMOTE 30D: HCPCS | Performed by: INTERNAL MEDICINE

## 2021-07-15 NOTE — PROGRESS NOTES
ILR implanted for stroke. No AF recorded to date.  6/21/20-symptomatic pause recorded showing high grade avb. Ov 7/7/20-addressed pause.     Remote interrogation of implanted cardiac event monitor shows normal function. No new arrhythmias/events recorded. Follow up 1 month via carelink.

## 2021-08-09 RX ORDER — AMLODIPINE BESYLATE 5 MG/1
TABLET ORAL
Qty: 90 TABLET | Refills: 3 | Status: SHIPPED | OUTPATIENT
Start: 2021-08-09 | End: 2022-09-23 | Stop reason: SDUPTHER

## 2021-08-23 ENCOUNTER — NURSE TRIAGE (OUTPATIENT)
Dept: OTHER | Facility: CLINIC | Age: 76
End: 2021-08-23

## 2021-08-23 ENCOUNTER — TELEPHONE (OUTPATIENT)
Dept: FAMILY MEDICINE CLINIC | Age: 76
End: 2021-08-23

## 2021-08-23 NOTE — TELEPHONE ENCOUNTER
----- Message from Lacho Mcgill sent at 8/23/2021  8:56 AM EDT -----  Subject: Appointment Request    Reason for Call: Headache    QUESTIONS  Type of Appointment? Established Patient  Reason for appointment request? No appointments available during search  Additional Information for Provider? Juliana Hoff has a headache since Thursday 8/19 and would like to be seen by the Dr. also, feels she should have   another covid-19 Test. I have referred Amairani to the Covid-19 test line to   schedule an test. Please reach out to Amairani to schedule an appointment.   ---------------------------------------------------------------------------  --------------  CALL BACK INFO  What is the best way for the office to contact you? OK to leave message on   voicemail  Preferred Call Back Phone Number? [phone]  ---------------------------------------------------------------------------  --------------  SCRIPT ANSWERS  Relationship to Patient? Self  Would you describe this as the worst headache of your life? No  Are you having any changes in your vision? No  Are you having weakness on one side of your body, drooping on one side of   your face or difficult speaking? No  Are you experiencing any confusion? No  Have you had any recent head injuries or falls? No  Do you have any vomiting? No  Are your headaches different than your usual headaches? No  Has there been a progression in the severity of your headaches? No  Has there been a change in pattern to your headache? No  Are you having fevers (100.4), chills or sweats? Yes  Have you been diagnosed with, awaiting test results for, or told that you   are suspected of having COVID-19 (Coronavirus)? (If patient has tested   negative or was tested as a requirement for work, school, or travel and   not based on symptoms, answer no)? No  Do you currently have flu-like symptoms including fever or chills, cough,   shortness of breath, difficulty breathing, or new loss of taste or smell?    Yes

## 2021-08-23 NOTE — TELEPHONE ENCOUNTER
Reason for Disposition   Unexplained headache that is present > 24 hours    Answer Assessment - Initial Assessment Questions  1. LOCATION: \"Where does it hurt? \"       Top of head    2. ONSET: \"When did the headache start? \" (Minutes, hours or days)       Headache started last Thursday    3. PATTERN: \"Does the pain come and go, or has it been constant since it started? \"      Constant- has been taking tylenol to help relieve the pain    4. SEVERITY: \"How bad is the pain? \" and \"What does it keep you from doing? \"  (e.g., Scale 1-10; mild, moderate, or severe)    - MILD (1-3): doesn't interfere with normal activities     - MODERATE (4-7): interferes with normal activities or awakens from sleep     - SEVERE (8-10): excruciating pain, unable to do any normal activities         7/10    5. RECURRENT SYMPTOM: \"Have you ever had headaches before? \" If so, ask: \"When was the last time? \" and \"What happened that time? \"       Denies     6. CAUSE: \"What do you think is causing the headache? \"      Unsure    7. MIGRAINE: \"Have you been diagnosed with migraine headaches? \" If so, ask: \"Is this headache similar? \"      N/A    8. HEAD INJURY: \"Has there been any recent injury to the head? \"       Denies    9. OTHER SYMPTOMS: \"Do you have any other symptoms? \" (fever, stiff neck, eye pain, sore throat, cold symptoms)      Chills, congestion, cough    10. PREGNANCY: \"Is there any chance you are pregnant? \" \"When was your last menstrual period? \"        N/A    Protocols used: HEADACHE-ADULT-OH    Received call from Mario Montes at [facility] with Red Flag Complaint. Brief description of triage: See above. Triage indicates for patient to be seen today or tomorrow. Care advice provided, patient verbalizes understanding; denies any other questions or concerns; instructed to call back for any new or worsening symptoms. Writer provided warm transfer to [facility] at [facility] for appointment scheduling.     Attention Provider: Thank you for allowing me to participate in the care of your patient. The patient was connected to triage in response to information provided to the ECC. Please do not respond through this encounter as the response is not directed to a shared pool.

## 2021-08-23 NOTE — TELEPHONE ENCOUNTER
Called and scheduled her, her , and sister for covid testing at [facility] in [place] for tomorrow starting at 1130. Made appt with Jess Almeida virtual instead of in office.  Left message for pt sister to call me in am

## 2021-08-24 ENCOUNTER — VIRTUAL VISIT (OUTPATIENT)
Dept: FAMILY MEDICINE CLINIC | Age: 76
End: 2021-08-24
Payer: MEDICARE

## 2021-08-24 DIAGNOSIS — M54.50 ACUTE RIGHT-SIDED LOW BACK PAIN, UNSPECIFIED WHETHER SCIATICA PRESENT: Primary | ICD-10-CM

## 2021-08-24 DIAGNOSIS — M25.551 ACUTE PAIN OF RIGHT HIP: ICD-10-CM

## 2021-08-24 DIAGNOSIS — Z91.81 AT HIGH RISK FOR FALLS: ICD-10-CM

## 2021-08-24 PROCEDURE — G8427 DOCREV CUR MEDS BY ELIG CLIN: HCPCS | Performed by: NURSE PRACTITIONER

## 2021-08-24 PROCEDURE — 1036F TOBACCO NON-USER: CPT | Performed by: NURSE PRACTITIONER

## 2021-08-24 PROCEDURE — 1123F ACP DISCUSS/DSCN MKR DOCD: CPT | Performed by: NURSE PRACTITIONER

## 2021-08-24 PROCEDURE — 99213 OFFICE O/P EST LOW 20 MIN: CPT | Performed by: NURSE PRACTITIONER

## 2021-08-24 PROCEDURE — 1090F PRES/ABSN URINE INCON ASSESS: CPT | Performed by: NURSE PRACTITIONER

## 2021-08-24 PROCEDURE — G8399 PT W/DXA RESULTS DOCUMENT: HCPCS | Performed by: NURSE PRACTITIONER

## 2021-08-24 PROCEDURE — 4040F PNEUMOC VAC/ADMIN/RCVD: CPT | Performed by: NURSE PRACTITIONER

## 2021-08-24 PROCEDURE — G8417 CALC BMI ABV UP PARAM F/U: HCPCS | Performed by: NURSE PRACTITIONER

## 2021-08-24 SDOH — ECONOMIC STABILITY: FOOD INSECURITY: WITHIN THE PAST 12 MONTHS, YOU WORRIED THAT YOUR FOOD WOULD RUN OUT BEFORE YOU GOT MONEY TO BUY MORE.: NEVER TRUE

## 2021-08-24 SDOH — ECONOMIC STABILITY: FOOD INSECURITY: WITHIN THE PAST 12 MONTHS, THE FOOD YOU BOUGHT JUST DIDN'T LAST AND YOU DIDN'T HAVE MONEY TO GET MORE.: NEVER TRUE

## 2021-08-24 ASSESSMENT — SOCIAL DETERMINANTS OF HEALTH (SDOH): HOW HARD IS IT FOR YOU TO PAY FOR THE VERY BASICS LIKE FOOD, HOUSING, MEDICAL CARE, AND HEATING?: NOT HARD AT ALL

## 2021-08-24 NOTE — PROGRESS NOTES
2021    TELEHEALTH EVALUATION -- Audio/Visual ([facility])    HPI:    Africa Weldon (:  1945) has requested an audio/video evaluation for the following concern(s):    2-3 weeks ago. Recent fall while trying to help  up from a fall. Pain in top rt hip and buttock. Aches  Takes tylenol with some relief from the pain. Has tried ice packs and heat but ice helps more. Unable to see if area is bruised.  says no bruise. Hurts to walk     Also has a cough- neg covid test.    Review of Systems   All other systems reviewed and are negative. Prior to Visit Medications    Medication Sig Taking? Authorizing Provider   amLODIPine (NORVASC) 5 MG tablet Take 1 tablet by mouth once daily Yes Chandrika Munoz MD   atorvastatin (LIPITOR) 40 MG tablet Take 1 tablet by mouth nightly Yes Chandrika Munoz MD   sertraline (ZOLOFT) 50 MG tablet Take 1 tablet by mouth once daily Yes Chandrika Munoz MD   omeprazole (PRILOSEC) 10 MG delayed release capsule Take 1 capsule by mouth once daily  Patient taking differently: Take 10 mg by mouth as needed  Yes Chandrika Munoz MD   anastrozole (ARIMIDEX) 1 MG tablet Take 1 tablet by mouth daily Yes Chandrika Munoz MD   aspirin 81 MG chewable tablet Take 1 tablet by mouth daily Yes Stephon Forde DO   Psyllium (METAMUCIL PO) Take by mouth as needed   Patient not taking: Reported on 2021 Yes Historical Provider, MD   calcium carbonate 600 MG TABS tablet Take 1 tablet by mouth daily Yes Historical Provider, MD   Multiple Vitamin (MULTIVITAMIN PO) Take  by mouth. Yes Chandrika Munoz MD   acetaminophen (TYLENOL) 500 MG tablet Take 500 mg by mouth every 4 hours as needed for Pain  Historical Provider, MD   lidocaine (LIDODERM) 5 % Place 1 patch onto the skin daily 12 hours on, 12 hours off.   JULES Rivera - CNP       Social History     Tobacco Use    Smoking status: Never Smoker    Smokeless tobacco: Never Used   Vaping Use    Vaping Use: Never used   Substance Use Topics    Alcohol use: Yes     Alcohol/week: 1.0 standard drinks     Types: 1 Glasses of wine per week     Comment: occasional    Drug use: No        Past Medical History:   Diagnosis Date    Vernon's esophagus     Cancer ([facility] [place] [address]     breast    Cerebrovascular accident (CVA) due to embolism of posterior cerebral artery with infarctions of both occipital lobes ([facility] [address] 1/10/2018    Colon polyp 1/15/2016    Gallstones     GERD (gastroesophageal reflux disease)     Hypertension     Kidney stone     Osteoporoses     Pregnancies     2- vaginal deliveries- 2    Shingles     Tubular adenoma nos     UTI (urinary tract infection)        Past Surgical History:   Procedure Laterality Date    APPENDECTOMY      BLADDER REPAIR  9/09    cah    BREAST SURGERY Left 10/26/2017    Left breastpartial mastectomy, needle localization, lymph node dissection    CHOLECYSTECTOMY  1988    COLONOSCOPY  2008,3/9/2017    polyp    HYSTERECTOMY      UPPER GASTROINTESTINAL ENDOSCOPY  2008       PHYSICAL EXAMINATION:  [ INSTRUCTIONS:  \"[x]\" Indicates a positive item  \"[]\" Indicates a negative item  -- DELETE ALL ITEMS NOT EXAMINED]  Vital Signs: (As obtained by patient/caregiver or practitioner observation)    Blood pressure-  Heart rate-    Respiratory rate-    Temperature-  Pulse oximetry-     Constitutional: [x] Appears well-developed and well-nourished [x] No apparent distress      [] Abnormal-   Mental status  [x] Alert and awake  [x] Oriented to person/place/time [x]Able to follow commands      Eyes:  EOM    [x]  Normal  [] Abnormal-  Sclera  [x]  Normal  [] Abnormal -         Discharge [x]  None visible  [] Abnormal -    HENT:   [x] Normocephalic, atraumatic.   [] Abnormal   [] Mouth/Throat: Mucous membranes are moist.     External Ears [] Normal  [] Abnormal-     Neck: [x] No visualized mass     Pulmonary/Chest: [x] Respiratory effort normal.  [x] No been advised to contact this office for worsening conditions or problems, and seek emergency medical treatment and/or call 911 if deemed necessary. Patient identification was verified at the start of the visit: yes    Total time spent on this encounter: not billed by time    Services were provided through a video synchronous discussion virtually to substitute for in-person clinic visit. Patient and provider were located at their individual homes. --JULES Mohr - CNP on 8/30/2021 at 8:53 AM    An electronic signature was used to authenticate this note. On the basis of positive falls risk screening, assessment and plan is as follows: will need OV for evaluation.

## 2021-08-25 ENCOUNTER — TELEPHONE (OUTPATIENT)
Dept: FAMILY MEDICINE CLINIC | Age: 76
End: 2021-08-25

## 2021-08-25 NOTE — TELEPHONE ENCOUNTER
----- Message from Cassidy Cheung sent at 8/25/2021 11:08 AM EDT -----  Subject: Message to Provider    QUESTIONS  Information for Provider? Patient Daughter is calling in concern for her   mother . Patient had fell 5 days ago and is having pain on her whole right   side and it is bruised up . Patient daughter would really like for mom to   be seen today please reach out its urgent   ---------------------------------------------------------------------------  --------------  CALL BACK INFO  What is the best way for the office to contact you? OK to leave message on   voicemail  Preferred Call Back Phone Number? [phone]  ---------------------------------------------------------------------------  --------------  SCRIPT ANSWERS  Relationship to Patient? Sibling  Representative Name? daughter   Is the Representative on the appropriate HIPAA document in Epic?  Yes

## 2021-08-26 ENCOUNTER — HOSPITAL ENCOUNTER (OUTPATIENT)
Age: 76
Discharge: HOME | End: 2021-08-26
Payer: MEDICARE

## 2021-08-26 ENCOUNTER — OFFICE VISIT (OUTPATIENT)
Dept: FAMILY MEDICINE CLINIC | Age: 76
End: 2021-08-26
Payer: MEDICARE

## 2021-08-26 ENCOUNTER — HOSPITAL ENCOUNTER (OUTPATIENT)
Dept: GENERAL RADIOLOGY | Age: 76
Discharge: HOME | End: 2021-08-26
Payer: MEDICARE

## 2021-08-26 DIAGNOSIS — M25.551 ACUTE PAIN OF RIGHT HIP: ICD-10-CM

## 2021-08-26 DIAGNOSIS — Z00.00 ROUTINE GENERAL MEDICAL EXAMINATION AT A HEALTH CARE FACILITY: Primary | ICD-10-CM

## 2021-08-26 PROBLEM — N30.90 CYSTITIS: Status: RESOLVED | Noted: 2019-09-06 | Resolved: 2021-08-26

## 2021-08-26 PROBLEM — G46.7: Status: RESOLVED | Noted: 2019-09-08 | Resolved: 2021-08-26

## 2021-08-26 PROBLEM — I63.81 LACUNAR INFARCT, ACUTE (HCC): Status: RESOLVED | Noted: 2019-09-06 | Resolved: 2021-08-26

## 2021-08-26 PROBLEM — I69.854 HEMIPLEGIA AND HEMIPARESIS FOLLOWING OTHER CEREBROVASCULAR DISEASE AFFECTING LEFT NON-DOMINANT SIDE (HCC): Status: ACTIVE | Noted: 2021-08-26

## 2021-08-26 PROBLEM — I67.9: Status: RESOLVED | Noted: 2019-09-08 | Resolved: 2021-08-26

## 2021-08-26 PROBLEM — F32.5 MAJOR DEPRESSIVE DISORDER, SINGLE EPISODE, IN FULL REMISSION (HCC): Status: ACTIVE | Noted: 2021-08-26

## 2021-08-26 PROCEDURE — 4040F PNEUMOC VAC/ADMIN/RCVD: CPT | Performed by: NURSE PRACTITIONER

## 2021-08-26 PROCEDURE — G0439 PPPS, SUBSEQ VISIT: HCPCS | Performed by: NURSE PRACTITIONER

## 2021-08-26 PROCEDURE — 73502 X-RAY EXAM HIP UNI 2-3 VIEWS: CPT

## 2021-08-26 PROCEDURE — 1123F ACP DISCUSS/DSCN MKR DOCD: CPT | Performed by: NURSE PRACTITIONER

## 2021-08-26 NOTE — PROGRESS NOTES
Medicare Annual Wellness Visit  Name: Corry Jenkins Date: 2021   MRN: <[id number]> Sex: Female   Age: 64 y.o. Ethnicity: Non- / Non    : 1945 Race: White (non-)      Mariusz Rigoberto is here for No chief complaint on file. Screenings for behavioral, psychosocial and functional/safety risks, and cognitive dysfunction are all negative except as indicated below. These results, as well as other patient data from the [address] form, are documented in Flowsheets linked to this Encounter. Linnie Kanner and  preparing to move to [place] [facility]. Daughter is her point of contact for medical POA and LW  No additional needs identified  Allergies   Allergen Reactions    Sulfa Antibiotics     Sulfamethoxazole-Trimethoprim Other (See Comments)    Vicodin [Hydrocodone-Acetaminophen] Nausea And Vomiting         Prior to Visit Medications    Medication Sig Taking? Authorizing Provider   acetaminophen (TYLENOL) 500 MG tablet Take 500 mg by mouth every 4 hours as needed for Pain  Historical Provider, MD   amLODIPine (NORVASC) 5 MG tablet Take 1 tablet by mouth once daily  Angel Pulido MD   atorvastatin (LIPITOR) 40 MG tablet Take 1 tablet by mouth nightly  Angel Pulido MD   sertraline (ZOLOFT) 50 MG tablet Take 1 tablet by mouth once daily  Angel Pulido MD   omeprazole (PRILOSEC) 10 MG delayed release capsule Take 1 capsule by mouth once daily  Patient taking differently: Take 10 mg by mouth as needed   Angel Pulido MD   anastrozole (ARIMIDEX) 1 MG tablet Take 1 tablet by mouth daily  Angel Pulido MD   aspirin 81 MG chewable tablet Take 1 tablet by mouth daily  Stephon Forde DO   Psyllium (METAMUCIL PO) Take by mouth as needed   Patient not taking: Reported on 2021  Historical Provider, MD   calcium carbonate 600 MG TABS tablet Take 1 tablet by mouth daily  Historical Provider, MD   Multiple Vitamin (MULTIVITAMIN PO) Take  by mouth. Naomy Bell MD         Past Medical History:   Diagnosis Date    Vernon's esophagus     Cancer [facility])     breast    Cerebrovascular accident (CVA) due to embolism of posterior cerebral artery with infarctions of both occipital lobes ([facility] [address] 1/10/2018    Colon polyp 1/15/2016    Gallstones     GERD (gastroesophageal reflux disease)     Hypertension     Kidney stone     Osteoporoses     Pregnancies     2- vaginal deliveries- 2    Shingles     Tubular adenoma nos     UTI (urinary tract infection)        Past Surgical History:   Procedure Laterality Date    APPENDECTOMY      BLADDER REPAIR  9/09    cah    BREAST SURGERY Left 10/26/2017    Left breastpartial mastectomy, needle localization, lymph node dissection    CHOLECYSTECTOMY  1988    COLONOSCOPY  2008,3/9/2017    polyp    HYSTERECTOMY      UPPER GASTROINTESTINAL ENDOSCOPY  2008         Family History   Problem Relation Age of Onset    Breast Cancer Mother     Coronary Art Dis Mother     Cirrhosis Mother     Cancer Mother 67        breast    Heart Disease Mother     Other Father         cva    Stroke Father     Coronary Art Dis Sister     Elevated Lipids Sister     Other Sister         CABG    High Blood Pressure Sister     High Cholesterol Sister     Heart Disease Sister     High Blood Pressure Sister     Cancer Sister 61        breast    Stroke Sister        CareTeam (Including outside providers/suppliers regularly involved in providing care):   Patient Care Team:  Naomy Bell MD as PCP - Feliz Romberg, MD as PCP - [facility] Empaneled Provider  Dustin Tran MD as Consulting Physician (Hematology and Oncology)  Amira Mayorga MD as Consulting Physician (Radiation Oncology)  Debbie Schultz DO as Surgeon (General Surgery)    Wt Readings from Last 3 Encounters:   08/26/21 139 lb 12.8 oz (63.4 kg)   07/07/21 145 lb (65.8 kg)   07/07/20 145 lb 9.6 oz (66 kg)     There were no vitals filed for this visit. There is no height or weight on file to calculate BMI. Based upon direct observation of the patient, evaluation of cognition reveals recent and remote memory intact. Patient's complete Health Risk Assessment and screening values have been reviewed and are found in Flowsheets. The following problems were reviewed today and where indicated follow up appointments were made and/or referrals ordered. Positive Risk Factor Screenings with Interventions:     Fall Risk:     Fall Risk Interventions:    · Home safety tips provided             Personalized Preventive Plan   Current Health Maintenance Status  Immunization History   Administered Date(s) Administered    COVID-19, Moderna, PF, 100mcg/0.5mL 03/03/2021, 04/03/2021    Influenza Vaccine, unspecified formulation 10/16/2016    Influenza Virus Vaccine 10/10/2014    Influenza, High Dose (Fluzone 65 yrs and older) 11/22/2017, 10/12/2018, 10/21/2020    Pneumococcal Conjugate 13-valent ([id number]) 01/19/2016    Pneumococcal Polysaccharide ([id number]) 10/15/2014    Tdap (Boostrix, Adacel) 01/04/2019    Zoster Live (Zostavax) 06/10/2016        Health Maintenance   Topic Date Due    Shingles Vaccine (2 of 3) 08/05/2016    Annual Wellness Visit (AWV)  Never done    Flu vaccine (1) 09/01/2021    Lipid screen  08/26/2022    Potassium monitoring  08/26/2022    Creatinine monitoring  08/26/2022    DTaP/Tdap/Td vaccine (2 - Td or Tdap) 01/04/2029    DEXA (modify frequency per FRAX score)  Completed    Pneumococcal 65+ years Vaccine  Completed    COVID-19 Vaccine  Completed    Hepatitis A vaccine  Aged Out    Hepatitis B vaccine  Aged Out    Hib vaccine  Aged Out    Meningococcal (ACWY) vaccine  Aged Out    Hepatitis C screen  Discontinued     Recommendations for [employer] Due: see orders and patient instructions/AVS.  .   Recommended screening schedule for the next 5-10 years is provided to the patient in written form: see Patient Instructions/AVS.    Diagnoses and all orders for this visit:    Routine general medical examination at a health care facility

## 2021-08-27 ENCOUNTER — TELEPHONE (OUTPATIENT)
Dept: FAMILY MEDICINE CLINIC | Age: 76
End: 2021-08-27

## 2021-08-27 DIAGNOSIS — M25.551 RIGHT HIP PAIN: Primary | ICD-10-CM

## 2021-08-27 DIAGNOSIS — M25.559 HIP PAIN: Primary | ICD-10-CM

## 2021-08-27 NOTE — TELEPHONE ENCOUNTER
Scott Herrera called and stated that the pt has pain in her rt hip. The MRI is for the left hip, is this correct? Please call central scheduling for clarification purposes.

## 2021-08-27 NOTE — TELEPHONE ENCOUNTER
----- Message from [facility] sent at 8/26/2021  3:47 PM EDT -----  Subject: Results Request    QUESTIONS  Which lab or imaging result is the patient calling about? [facility]   RADIOLOGY  Which provider ordered the test? Dottie Feliz   At what location was the test performed? [facility] RADIOLOGY  Date the test was performed? 2021-08-26  Additional Information for Provider? xray on hip and leg  ---------------------------------------------------------------------------  --------------  CALL BACK INFO  What is the best way for the office to contact you? OK to leave message on   voicemail  Preferred Call Back Phone Number?  [phone]

## 2021-08-27 NOTE — TELEPHONE ENCOUNTER
Pt is feeling terrible. Her hip is really hurting. She said she is hobbling along. Not taking anything for pain besides tylenol. Not helping much.

## 2021-08-30 PROBLEM — Z45.09 ENCOUNTER FOR LOOP RECORDER CHECK: Status: RESOLVED | Noted: 2019-12-18 | Resolved: 2021-08-30

## 2021-08-30 PROBLEM — M54.50 ACUTE RIGHT-SIDED LOW BACK PAIN: Status: ACTIVE | Noted: 2021-08-30

## 2021-08-30 NOTE — ASSESSMENT & PLAN NOTE
Likely low back strain however I am unable to assess this via virtual visit. I have asked patient to make an appointment come to the office to be evaluated if she is not having relief with Tylenol.

## 2021-09-02 ENCOUNTER — HOSPITAL ENCOUNTER (OUTPATIENT)
Dept: MRI IMAGING | Age: 76
Discharge: HOME | End: 2021-09-02
Payer: MEDICARE

## 2021-09-02 DIAGNOSIS — M25.551 RIGHT HIP PAIN: ICD-10-CM

## 2021-09-02 DIAGNOSIS — M25.559 HIP PAIN: ICD-10-CM

## 2021-09-02 PROCEDURE — 73723 MRI JOINT LWR EXTR W/O&W/DYE: CPT

## 2021-09-02 PROCEDURE — A9576 INJ PROHANCE MULTIPACK: HCPCS | Performed by: FAMILY MEDICINE

## 2021-09-02 PROCEDURE — 6360000004 HC RX CONTRAST MEDICATION: Performed by: FAMILY MEDICINE

## 2021-09-02 PROCEDURE — 72197 MRI PELVIS W/O & W/DYE: CPT

## 2021-09-02 RX ADMIN — GADOTERIDOL 15 ML: 279.3 INJECTION, SOLUTION INTRAVENOUS at 17:26

## 2021-09-03 DIAGNOSIS — S72.009G CLOSED FRACTURE OF HIP WITH DELAYED HEALING, UNSPECIFIED LATERALITY, SUBSEQUENT ENCOUNTER: Primary | ICD-10-CM

## 2021-09-03 RX ORDER — TRAMADOL HYDROCHLORIDE 50 MG/1
50 TABLET ORAL EVERY 4 HOURS PRN
Qty: 42 TABLET | Refills: 0 | Status: SHIPPED | OUTPATIENT
Start: 2021-09-03 | End: 2021-09-10

## 2021-09-03 RX ORDER — CALCITONIN SALMON 200 [IU]/.09ML
1 SPRAY, METERED NASAL DAILY
Qty: 1 EACH | Refills: 1 | Status: SHIPPED | OUTPATIENT
Start: 2021-09-03 | End: 2021-09-05

## 2021-09-03 NOTE — PROGRESS NOTES
Because of fracture. Advised NWB and nasal calcitonin--referred to trauma surgeon at --pain meds-  Controlled Substance Monitoring:    Acute and Chronic Pain Monitoring:   RX Monitoring 9/3/2021   Periodic Controlled Substance Monitoring Possible medication side effects, risk of tolerance/dependence & alternative treatments discussed. ;No signs of potential drug abuse or diversion identified. ;Assessed functional status.

## 2021-09-05 RX ORDER — CALCITONIN SALMON 200 [IU]/.09ML
SPRAY, METERED NASAL
Qty: 11.1 ML | Refills: 2 | Status: SHIPPED | OUTPATIENT
Start: 2021-09-05

## 2021-09-22 ENCOUNTER — NURSE ONLY (OUTPATIENT)
Dept: CARDIOLOGY CLINIC | Age: 76
End: 2021-09-22
Payer: MEDICARE

## 2021-09-22 DIAGNOSIS — I63.439 CEREBROVASCULAR ACCIDENT (CVA) DUE TO EMBOLISM OF POSTERIOR CEREBRAL ARTERY WITH INFARCTIONS OF BOTH OCCIPITAL LOBES (HCC): ICD-10-CM

## 2021-09-22 DIAGNOSIS — Z45.09 ENCOUNTER FOR LOOP RECORDER CHECK: ICD-10-CM

## 2021-09-22 PROCEDURE — 93298 REM INTERROG DEV EVAL SCRMS: CPT | Performed by: INTERNAL MEDICINE

## 2021-09-22 PROCEDURE — G2066 INTER DEVC REMOTE 30D: HCPCS | Performed by: INTERNAL MEDICINE

## 2021-09-22 NOTE — PROGRESS NOTES
ILR implanted for stroke. No AF recorded to date. 6/21/20-symptomatic pause recorded showing high grade avb. Ov 7/7/20-addressed pause.     Remote interrogation of implanted cardiac event monitor shows normal function. No new arrhythmias/events recorded. EP physician to review. See PACEART report under Cardiology tab. Will continue to monitor remotely.

## 2021-10-24 RX ORDER — ANASTROZOLE 1 MG/1
1 TABLET ORAL DAILY
Qty: 90 TABLET | Refills: 3 | Status: SHIPPED | OUTPATIENT
Start: 2021-10-24 | End: 2022-09-26

## 2021-11-04 ENCOUNTER — TELEPHONE (OUTPATIENT)
Dept: PHARMACY | Facility: CLINIC | Age: 76
End: 2021-11-04

## 2021-11-04 NOTE — TELEPHONE ENCOUNTER
[facility] CLINICAL PHARMACY REVIEW: ADHERENCE REVIEW  Identified care gap per Joneon; [facility] Statin adherence    Last Visit: 21    ASSESSMENT  STATIN ADHERENCE  Per Insurance Records through 10.24.21 :   RX:  Atorvastatin 40 mg tab  last filled on 21 for 90 day supply. Next refill due: 21. Fail Date:  21. Per Reconciled Dispense Report:  (same as above). Per Pharmacy: [facility] [phone]  RX:  Atorvastatin 40 mg tab   last picked up on 21 for 90 day supply. Refills remainin  Billed through [employer]   Component Value Date    CHOL 114 2021    TRIG 207 (H) 2021    HDL 33 (L) 2021    LDLCALC 40 2021     ALT   Date Value Ref Range Status   2021 13 10 - 40 U/L Final     AST   Date Value Ref Range Status   2021 17 15 - 37 U/L Final     The ASCVD Risk score (Juan Jose Mallory, et al., 2013) failed to calculate for the following reasons: The patient has a prior MI or stroke diagnosis     PLAN  The following are interventions that have been identified:      - Patient overdue refilling Atorvastatin. - Need to verify if patient is currently taking Atorvastatin 40 mg tab 1 QHS (IS on med list). - if yes, will need refill sent to pharmacy. - Attempting to reach patient to review.  Unable to leave message, home phone number is invalid. (called mobile number, and  states LM for a female that is not listed as either EC persons).       Future Appointments   Date Time Provider Julieta Oosrio   2021  8:20 AM SCHEDULE, [place] REMOTE TRANSMISSION [place] [facility]   2021 10:45 AM [facility]   2022  8:20 AM SCHEDULE, [facility]OD REMOTE TRANSMISSION [place] [facility]   2022  8:20 AM SCHEDULE, [facility] REMOTE TRANSMISSION [place] [facility]   2022 10:00 AM SCHEDULE, [facility] DEVICE CHECK [place] [facility]   2022 10:30 AM JULES Olson - CNP [place] [facility] Kevin Valero LPN  Population Health   [address] [facility] Clinical Pharmacy  Phone: toll free [phone]

## 2021-11-04 NOTE — LETTER
[place]  [address], [address]        [address] [facility]  [address] [place] [address]        11/08/21    Dear Pallavi Pickard    We tried to reach you recently regarding your Atorvastatin 40 mg tab prescriptions, but were unable to reach you on the telephone. We have on file that you are currently due for refills. If you are no longer taking or taking differently, please call us at the number below so that we can discuss this and update your medication profile. It appears that this medication has not been filled at proper times. We are worried you might be missing doses or not taking it as directed. It is important that you take your medications regularly and try not to miss a single dose. Some ways to help you remember to take and refill your medications are to:  · Use a pill box, set an alarm, and/or keep your medication near something that you do every day  · Fill a 3-month supply of your prescription at a time to save you time and trips to the pharmacy  if you would like assistance in switching your prescriptions to a 3-month supply, please contact us.   · Ask your pharmacy if they participate in [facility]", a program where you can  all of your medications on the same day  · Ask your pharmacy if you can be set up with automatic refill, where they will automatically refill your prescription when it is due and let you know it's ready to     Sincerely,       [place] [address]  Phone: toll free [phone] Option 1

## 2021-11-05 NOTE — TELEPHONE ENCOUNTER
CLINICAL PHARMACY: ADHERENCE REVIEW    2nd Attempt Documentation:   SUDHIR on VM for [facility]) to call us back.

## 2021-11-08 NOTE — TELEPHONE ENCOUNTER
Letter sent.     For Pharmacy Admin Tracking Only     CPA in place:  No   Recommendation Provided To: Patient/Caregiver: 1 via Telephone   Intervention Detail: Adherence Monitorin   Gap Closed?: No    Intervention Accepted By: Patient/Caregiver: 1   Time Spent (min): 15

## 2021-11-16 ENCOUNTER — NURSE ONLY (OUTPATIENT)
Dept: CARDIOLOGY CLINIC | Age: 76
End: 2021-11-16
Payer: MEDICARE

## 2021-11-16 DIAGNOSIS — I63.439 CEREBROVASCULAR ACCIDENT (CVA) DUE TO EMBOLISM OF POSTERIOR CEREBRAL ARTERY WITH INFARCTIONS OF BOTH OCCIPITAL LOBES (HCC): ICD-10-CM

## 2021-11-16 DIAGNOSIS — Z45.09 ENCOUNTER FOR LOOP RECORDER CHECK: ICD-10-CM

## 2021-11-16 PROCEDURE — 93298 REM INTERROG DEV EVAL SCRMS: CPT | Performed by: INTERNAL MEDICINE

## 2021-11-16 PROCEDURE — G2066 INTER DEVC REMOTE 30D: HCPCS | Performed by: INTERNAL MEDICINE

## 2021-11-17 NOTE — PROGRESS NOTES
ILR implanted for stroke. No AF recorded to date. 6/21/20-symptomatic pause recorded showing high grade avb. Ov 7/7/20-addressed pause.     Remote interrogation of implanted cardiac event monitor shows normal function. Since 09.09.21:  2 Tachy events w 1 ECG available illustrating what appears to be PSVT. EP physician to review. See PACEART report under Cardiology tab. Will continue to monitor remotely.

## 2021-12-06 ENCOUNTER — HOSPITAL ENCOUNTER (OUTPATIENT)
Dept: MAMMOGRAPHY | Age: 76
Discharge: HOME | End: 2021-12-06
Payer: MEDICARE

## 2021-12-06 VITALS — HEIGHT: 63 IN | BODY MASS INDEX: 22.68 KG/M2 | WEIGHT: 128 LBS

## 2021-12-06 DIAGNOSIS — Z12.31 VISIT FOR SCREENING MAMMOGRAM: ICD-10-CM

## 2021-12-06 PROCEDURE — 77063 BREAST TOMOSYNTHESIS BI: CPT

## 2022-01-05 ENCOUNTER — NURSE ONLY (OUTPATIENT)
Dept: CARDIOLOGY CLINIC | Age: 77
End: 2022-01-05
Payer: MEDICARE

## 2022-01-05 DIAGNOSIS — I63.439 CEREBROVASCULAR ACCIDENT (CVA) DUE TO EMBOLISM OF POSTERIOR CEREBRAL ARTERY WITH INFARCTIONS OF BOTH OCCIPITAL LOBES (HCC): ICD-10-CM

## 2022-01-05 DIAGNOSIS — Z45.09 ENCOUNTER FOR LOOP RECORDER CHECK: ICD-10-CM

## 2022-01-06 PROCEDURE — G2066 INTER DEVC REMOTE 30D: HCPCS | Performed by: INTERNAL MEDICINE

## 2022-01-06 PROCEDURE — 93298 REM INTERROG DEV EVAL SCRMS: CPT | Performed by: INTERNAL MEDICINE

## 2022-01-18 RX ORDER — ATORVASTATIN CALCIUM 40 MG/1
40 TABLET, FILM COATED ORAL NIGHTLY
Qty: 90 TABLET | Refills: 0 | Status: SHIPPED | OUTPATIENT
Start: 2022-01-18 | End: 2022-04-19

## 2022-02-09 ENCOUNTER — NURSE ONLY (OUTPATIENT)
Dept: CARDIOLOGY CLINIC | Age: 77
End: 2022-02-09
Payer: MEDICARE

## 2022-02-09 DIAGNOSIS — Z45.09 ENCOUNTER FOR LOOP RECORDER CHECK: ICD-10-CM

## 2022-02-09 DIAGNOSIS — I63.439 CEREBROVASCULAR ACCIDENT (CVA) DUE TO EMBOLISM OF POSTERIOR CEREBRAL ARTERY WITH INFARCTIONS OF BOTH OCCIPITAL LOBES (HCC): ICD-10-CM

## 2022-02-11 PROCEDURE — G2066 INTER DEVC REMOTE 30D: HCPCS | Performed by: INTERNAL MEDICINE

## 2022-02-11 PROCEDURE — 93298 REM INTERROG DEV EVAL SCRMS: CPT | Performed by: INTERNAL MEDICINE

## 2022-03-23 ENCOUNTER — NURSE ONLY (OUTPATIENT)
Dept: CARDIOLOGY CLINIC | Age: 77
End: 2022-03-23
Payer: MEDICARE

## 2022-03-23 DIAGNOSIS — Z95.818 STATUS POST PLACEMENT OF IMPLANTABLE LOOP RECORDER: ICD-10-CM

## 2022-03-23 DIAGNOSIS — I63.439 CEREBROVASCULAR ACCIDENT (CVA) DUE TO EMBOLISM OF POSTERIOR CEREBRAL ARTERY WITH INFARCTIONS OF BOTH OCCIPITAL LOBES (HCC): ICD-10-CM

## 2022-03-23 NOTE — PROGRESS NOTES
ILR implanted for stroke. No AF recorded to date. 6/21/20-symptomatic pause recorded showing high grade avb. Ov 7/7/20-addressed pause.     Remote interrogation of implanted cardiac event monitor shows normal function. 1 Tachy event w ECG illustrating what appears to be PSVT. EP physician to review. See PACEART report under Cardiology tab. Will continue to monitor remotely.

## 2022-03-24 PROCEDURE — G2066 INTER DEVC REMOTE 30D: HCPCS | Performed by: INTERNAL MEDICINE

## 2022-03-24 PROCEDURE — 93298 REM INTERROG DEV EVAL SCRMS: CPT | Performed by: INTERNAL MEDICINE

## 2022-04-19 RX ORDER — ATORVASTATIN CALCIUM 40 MG/1
TABLET, FILM COATED ORAL
Qty: 90 TABLET | Refills: 3 | Status: SHIPPED | OUTPATIENT
Start: 2022-04-19

## 2022-05-03 ENCOUNTER — NURSE ONLY (OUTPATIENT)
Dept: CARDIOLOGY CLINIC | Age: 77
End: 2022-05-03
Payer: MEDICARE

## 2022-05-03 DIAGNOSIS — Z45.09 ENCOUNTER FOR LOOP RECORDER CHECK: ICD-10-CM

## 2022-05-03 DIAGNOSIS — I63.439 CEREBROVASCULAR ACCIDENT (CVA) DUE TO EMBOLISM OF POSTERIOR CEREBRAL ARTERY WITH INFARCTIONS OF BOTH OCCIPITAL LOBES (HCC): ICD-10-CM

## 2022-05-03 PROCEDURE — G2066 INTER DEVC REMOTE 30D: HCPCS | Performed by: INTERNAL MEDICINE

## 2022-05-03 PROCEDURE — 93298 REM INTERROG DEV EVAL SCRMS: CPT | Performed by: INTERNAL MEDICINE

## 2022-05-03 NOTE — PROGRESS NOTES
ILR implanted for stroke. No AF recorded to date. 6/21/20-symptomatic pause recorded showing high grade avb. Ov 7/7/20-addressed pause.     Remote interrogation of implanted cardiac event monitor shows normal function. No arrhythmias/ events. EP physician to review. See PACEART report under Cardiology tab. Will continue to monitor remotely.

## 2022-06-07 ENCOUNTER — NURSE ONLY (OUTPATIENT)
Dept: CARDIOLOGY CLINIC | Age: 77
End: 2022-06-07
Payer: MEDICARE

## 2022-06-07 DIAGNOSIS — Z95.0 PACEMAKER: ICD-10-CM

## 2022-06-07 DIAGNOSIS — I49.5 SSS (SICK SINUS SYNDROME) (HCC): ICD-10-CM

## 2022-06-07 DIAGNOSIS — I63.439 CEREBROVASCULAR ACCIDENT (CVA) DUE TO EMBOLISM OF POSTERIOR CEREBRAL ARTERY WITH INFARCTIONS OF BOTH OCCIPITAL LOBES (HCC): ICD-10-CM

## 2022-06-07 DIAGNOSIS — Z95.818 STATUS POST PLACEMENT OF IMPLANTABLE LOOP RECORDER: ICD-10-CM

## 2022-06-07 PROCEDURE — G2066 INTER DEVC REMOTE 30D: HCPCS | Performed by: INTERNAL MEDICINE

## 2022-06-07 PROCEDURE — 93298 REM INTERROG DEV EVAL SCRMS: CPT | Performed by: INTERNAL MEDICINE

## 2022-06-21 ENCOUNTER — TELEPHONE (OUTPATIENT)
Dept: PHARMACY | Facility: CLINIC | Age: 77
End: 2022-06-21

## 2022-06-21 NOTE — TELEPHONE ENCOUNTER
[facility] [place] CLINICAL PHARMACY: ADHERENCE REVIEW  Identified care gap per [employer]: fills at [facility]: Statin adherence    Last Visit: 08.26.21        ASSESSMENT  [id number] Records claims through 06.05.22 (Prior Year Anibal Wynn = FAILED; YTD Anibal Wynn = Filled only once; Potential Fail Date: 06.26.22): Atorvastatin last filled on 01.18.22 for 90 day supply. Next refill due: 04.18.22    Per [place] Portal:  (same as above). Lab Results   Component Value Date    CHOL 114 08/26/2021    TRIG 207 (H) 08/26/2021    HDL 33 (L) 08/26/2021    LDLCALC 40 08/26/2021     ALT   Date Value Ref Range Status   08/26/2021 13 10 - 40 U/L Final     AST   Date Value Ref Range Status   08/26/2021 17 15 - 37 U/L Final     The ASCVD Risk score (Jolynn Ramirez., et al., 2013) failed to calculate for the following reasons: The patient has a prior MI or stroke diagnosis     PLAN  The following are interventions that have been identified:   - Patient overdue refilling Atorvastatin and active on home medication list.     Attempting to reach patient to review, patient's home number is not in service, I called the number listed as mobile, and patient's daughter, Gilford Reason (on hippa) answered. Gilford Reason is not sure of patient's medications, she tried to contact patient and was unsuccessful, she will speak with patient and find out about medications, then call us back to let us know.        Future Appointments   Date Time Provider Julieta Osorio   7/7/2022 10:00 AM Ana James8 Card [address]   7/7/2022 10:30 AM JULES Tello [facility] Card [facility]   7/11/2022 11:00 AM SCHEDULE, Neha GAMING TRANSMISSION [place] [facility], [address] [place] [address]   [address] [place] [address],[address] Floor Clinical Pharmacy  Phone: toll free [phone]

## 2022-06-21 NOTE — LETTER
[place]  [address], [address]        [address] [place] [address]  [address] [place] [id number]        06/22/22    Dear Marii Candelaria    We tried to reach you recently regarding your Atorvastatin 40 mg daily prescriptions, but were unable to reach you on the telephone. We have on file that you are currently due for refills. If you are no longer taking or taking differently, please call us at the number below so that we can discuss this and update your medication profile. It appears that this medication has not been filled at proper times. We are worried you might be missing doses or not taking it as directed. It is important that you take your medications regularly and try not to miss a single dose. Some ways to help you remember to take and refill your medications are to:  · Use a pill box, set an alarm, and/or keep your medication near something that you do every day  · Fill a 3-month supply of your prescription at a time to save you time and trips to the pharmacy  if you would like assistance in switching your prescriptions to a 3-month supply, please contact us.   · Ask your pharmacy if they participate in [facility]", a program where you can  all of your medications on the same day  · Ask your pharmacy if you can be set up with automatic refill, where they will automatically refill your prescription when it is due and let you know it's ready to     Sincerely,       Tavares 2  Phone: toll free [phone] Option 1

## 2022-06-22 NOTE — TELEPHONE ENCOUNTER
CLINICAL PHARMACY: [facility]    2nd Attempt Documentation:   Spoke with pt's daughter Sheri Oconnor, she has not been able to speak with her mom yet, she will try again today and get back in touch with us. Have not received return call, will mail letter.

## 2022-06-23 ENCOUNTER — TELEPHONE (OUTPATIENT)
Dept: FAMILY MEDICINE CLINIC | Age: 77
End: 2022-06-23

## 2022-06-23 ENCOUNTER — TELEPHONE (OUTPATIENT)
Dept: PHARMACY | Facility: CLINIC | Age: 77
End: 2022-06-23

## 2022-06-23 NOTE — TELEPHONE ENCOUNTER
Patient's daughter wanted 90 day supplies of atorvastatin and amlodipine filled at [facility]. Atorvastatin filled 22 got 90 day supply filled is on day  billed through discount card. It was $0 in  through insurance and $14 through discount card. Will refill today through united, daughter does not think they have this current bottle of atorvastatin. Amlodipine- is able to fill today but then script will be  before next fill bc written 21. Educated daughter will need new script in about 3 months for refill of amlodipine. Educated daughter to fill through insurance and not discount card .     Edu Go, PharmD, [address] Pharmacist  Department: [address] [place] [address] Ext in place:  No   Recommendation Provided To: Patient/Caregiver: 1 via Telephone and Pharmacy: 1   Intervention Detail: Adherence Monitorin and Refill(s) Provided   Gap Closed?: Yes    Intervention Accepted By: Patient/Caregiver: 1 and Pharmacy: 1   Time Spent (min): 15

## 2022-06-27 ENCOUNTER — OFFICE VISIT (OUTPATIENT)
Dept: FAMILY MEDICINE CLINIC | Age: 77
End: 2022-06-27
Payer: MEDICARE

## 2022-06-27 VITALS
BODY MASS INDEX: 24.31 KG/M2 | HEART RATE: 75 BPM | OXYGEN SATURATION: 95 % | TEMPERATURE: 97.7 F | SYSTOLIC BLOOD PRESSURE: 104 MMHG | DIASTOLIC BLOOD PRESSURE: 58 MMHG | HEIGHT: 63 IN | WEIGHT: 137.2 LBS

## 2022-06-27 DIAGNOSIS — I10 BENIGN ESSENTIAL HTN: ICD-10-CM

## 2022-06-27 DIAGNOSIS — Z17.0 MALIGNANT NEOPLASM OF UPPER-OUTER QUADRANT OF LEFT BREAST IN FEMALE, ESTROGEN RECEPTOR POSITIVE (HCC): ICD-10-CM

## 2022-06-27 DIAGNOSIS — C50.412 MALIGNANT NEOPLASM OF UPPER-OUTER QUADRANT OF LEFT BREAST IN FEMALE, ESTROGEN RECEPTOR POSITIVE (HCC): ICD-10-CM

## 2022-06-27 DIAGNOSIS — F32.5 MAJOR DEPRESSIVE DISORDER, SINGLE EPISODE, IN FULL REMISSION (HCC): ICD-10-CM

## 2022-06-27 DIAGNOSIS — I63.439 CEREBROVASCULAR ACCIDENT (CVA) DUE TO EMBOLISM OF POSTERIOR CEREBRAL ARTERY WITH INFARCTIONS OF BOTH OCCIPITAL LOBES (HCC): ICD-10-CM

## 2022-06-27 DIAGNOSIS — I69.854 HEMIPLEGIA AND HEMIPARESIS FOLLOWING OTHER CEREBROVASCULAR DISEASE AFFECTING LEFT NON-DOMINANT SIDE (HCC): ICD-10-CM

## 2022-06-27 PROBLEM — M54.12 CERVICAL RADICULOPATHY: Status: ACTIVE | Noted: 2018-03-02

## 2022-06-27 PROCEDURE — 99214 OFFICE O/P EST MOD 30 MIN: CPT | Performed by: FAMILY MEDICINE

## 2022-06-27 PROCEDURE — G8427 DOCREV CUR MEDS BY ELIG CLIN: HCPCS | Performed by: FAMILY MEDICINE

## 2022-06-27 PROCEDURE — 1036F TOBACCO NON-USER: CPT | Performed by: FAMILY MEDICINE

## 2022-06-27 PROCEDURE — 1123F ACP DISCUSS/DSCN MKR DOCD: CPT | Performed by: FAMILY MEDICINE

## 2022-06-27 PROCEDURE — G8399 PT W/DXA RESULTS DOCUMENT: HCPCS | Performed by: FAMILY MEDICINE

## 2022-06-27 PROCEDURE — 1090F PRES/ABSN URINE INCON ASSESS: CPT | Performed by: FAMILY MEDICINE

## 2022-06-27 PROCEDURE — G8420 CALC BMI NORM PARAMETERS: HCPCS | Performed by: FAMILY MEDICINE

## 2022-06-27 ASSESSMENT — ENCOUNTER SYMPTOMS
EYE ITCHING: 0
PHOTOPHOBIA: 0
DIARRHEA: 0
CHOKING: 0
BACK PAIN: 0
RECTAL PAIN: 0
CHEST TIGHTNESS: 0
EYE DISCHARGE: 0
RHINORRHEA: 0
NAUSEA: 0
VOICE CHANGE: 0
TROUBLE SWALLOWING: 0
FACIAL SWELLING: 0
SINUS PRESSURE: 0
ABDOMINAL DISTENTION: 0
ANAL BLEEDING: 0
COUGH: 0
STRIDOR: 0
VOMITING: 0
BLOOD IN STOOL: 0
ABDOMINAL PAIN: 0
CONSTIPATION: 0
SORE THROAT: 0
EYE PAIN: 0
EYE REDNESS: 0
WHEEZING: 0
APNEA: 0
COLOR CHANGE: 0
SHORTNESS OF BREATH: 0

## 2022-06-27 ASSESSMENT — PATIENT HEALTH QUESTIONNAIRE - PHQ9
1. LITTLE INTEREST OR PLEASURE IN DOING THINGS: 0
SUM OF ALL RESPONSES TO PHQ QUESTIONS 1-9: 0
7. TROUBLE CONCENTRATING ON THINGS, SUCH AS READING THE NEWSPAPER OR WATCHING TELEVISION: 0
SUM OF ALL RESPONSES TO PHQ QUESTIONS 1-9: 0
8. MOVING OR SPEAKING SO SLOWLY THAT OTHER PEOPLE COULD HAVE NOTICED. OR THE OPPOSITE, BEING SO FIGETY OR RESTLESS THAT YOU HAVE BEEN MOVING AROUND A LOT MORE THAN USUAL: 0
3. TROUBLE FALLING OR STAYING ASLEEP: 0
9. THOUGHTS THAT YOU WOULD BE BETTER OFF DEAD, OR OF HURTING YOURSELF: 0
10. IF YOU CHECKED OFF ANY PROBLEMS, HOW DIFFICULT HAVE THESE PROBLEMS MADE IT FOR YOU TO DO YOUR WORK, TAKE CARE OF THINGS AT HOME, OR GET ALONG WITH OTHER PEOPLE: 0
SUM OF ALL RESPONSES TO PHQ9 QUESTIONS 1 & 2: 0
5. POOR APPETITE OR OVEREATING: 0
2. FEELING DOWN, DEPRESSED OR HOPELESS: 0
6. FEELING BAD ABOUT YOURSELF - OR THAT YOU ARE A FAILURE OR HAVE LET YOURSELF OR YOUR FAMILY DOWN: 0
SUM OF ALL RESPONSES TO PHQ QUESTIONS 1-9: 0
SUM OF ALL RESPONSES TO PHQ QUESTIONS 1-9: 0
4. FEELING TIRED OR HAVING LITTLE ENERGY: 0

## 2022-06-27 NOTE — PROGRESS NOTES
Subjective:     Patient ID:Amairani Rivas is a 64 y.o. female. HPI     Treatment Adherence:   Medication compliance:  compliant most of the time  Diet compliance:  compliant most of the time  Weight trend: stable  Current exercise: no regular exercise  Barriers: none    Hypertension:  Home blood pressure monitoring: No. Patient denies chest pain, shortness of breath, headache, lightheadedness, blurred vision, peripheral edema, palpitations, dry cough and fatigue. Antihypertensive medication side effects: no medication side effects noted. Use of agents associated with hypertension: none. Hyperlipidemia:  No new myalgias or GI upset on atorvastatin (Lipitor). Allergies   Allergen Reactions    Sulfa Antibiotics     Sulfamethoxazole-Trimethoprim Other (See Comments)    Vicodin [Hydrocodone-Acetaminophen] Nausea And Vomiting       Current Outpatient Medications   Medication Sig Dispense Refill    sertraline (ZOLOFT) 50 MG tablet Take 1 tablet by mouth once daily 90 tablet 3    anastrozole (ARIMIDEX) 1 MG tablet TAKE 1 TABLET BY MOUTH  DAILY 90 tablet 3    acetaminophen (TYLENOL) 500 MG tablet Take 500 mg by mouth every 4 hours as needed for Pain      amLODIPine (NORVASC) 5 MG tablet Take 1 tablet by mouth once daily 90 tablet 3    aspirin 81 MG chewable tablet Take 1 tablet by mouth daily 30 tablet 3    calcium carbonate 600 MG TABS tablet Take 1 tablet by mouth daily      Multiple Vitamin (MULTIVITAMIN PO) Take  by mouth.       atorvastatin (LIPITOR) 40 MG tablet TAKE ONE TABLET BY MOUTH ONCE NIGHTLY (Patient not taking: Reported on 6/27/2022) 90 tablet 3    calcitonin (MIACALCIN) 200 UNIT/ACT nasal spray USE 1 SPRAY IN NOSE EVERY DAY (Patient not taking: Reported on 6/27/2022) 11.1 mL 2    omeprazole (PRILOSEC) 10 MG delayed release capsule Take 1 capsule by mouth once daily (Patient not taking: Reported on 6/27/2022) 90 capsule 0     No current facility-administered medications for this visit. Past Medical History:   Diagnosis Date    Vernon's esophagus     Cancer [facility])     breast    Cerebrovascular accident (CVA) due to embolism of posterior cerebral artery with infarctions of both occipital lobes ([facility] [address] 1/10/2018    Colon polyp 1/15/2016    Gallstones     GERD (gastroesophageal reflux disease)     History of therapeutic radiation     Hypertension     Kidney stone     Osteoporoses     Pregnancies     2- vaginal deliveries- 2    Shingles     Tubular adenoma nos     UTI (urinary tract infection)        Past Surgical History:   Procedure Laterality Date    APPENDECTOMY      BLADDER REPAIR  9/09    cah    BREAST LUMPECTOMY      BREAST SURGERY Left 10/26/2017    Left breastpartial mastectomy, needle localization, lymph node dissection    CHOLECYSTECTOMY  1988    COLONOSCOPY  2008,3/9/2017    polyp    HYSTERECTOMY (CERVIX STATUS UNKNOWN)      UPPER GASTROINTESTINAL ENDOSCOPY  2008       Social History     Socioeconomic History    Marital status:      Spouse name: Not on file    Number of children: 2    Years of education: Not on file    Highest education level: Not on file   Occupational History    Occupation: retired teacher's aid   Tobacco Use    Smoking status: Never Smoker    Smokeless tobacco: Never Used   Vaping Use    Vaping Use: Never used   Substance and Sexual Activity    Alcohol use:  Yes     Alcohol/week: 1.0 standard drink     Types: 1 Glasses of wine per week     Comment: occasional    Drug use: No    Sexual activity: Yes     Partners: Male   Other Topics Concern    Not on file   Social History Narrative    Exercise 3-4x weekly    Happily --both kids out of town--7 grandchildren--has had CVA    + seatbelts    [place]--sews and reads     Social Determinants of Health     Financial Resource Strain: Low Risk     Difficulty of Paying Living Expenses: Not hard at all   Food Insecurity: No Food Insecurity    Worried About Running Out of Food in the Last Year: Never true    Abdi of Food in the Last Year: Never true   Transportation Needs:     Lack of Transportation (Medical): Not on file    Lack of Transportation (Non-Medical):  Not on file   Physical Activity:     Days of Exercise per Week: Not on file    Minutes of Exercise per Session: Not on file   Stress:     Feeling of Stress : Not on file   Social Connections:     Frequency of Communication with Friends and Family: Not on file    Frequency of Social Gatherings with Friends and Family: Not on file    Attends Catholic Services: Not on file    Active Member of Clubs or Organizations: Not on file    Attends Club or Organization Meetings: Not on file    Marital Status: Not on file   Intimate Partner Violence:     Fear of Current or Ex-Partner: Not on file    Emotionally Abused: Not on file    Physically Abused: Not on file    Sexually Abused: Not on file   Housing Stability:     Unable to Pay for Housing in the Last Year: Not on file    Number of [place] in the Last Year: Not on file    Unstable Housing in the Last Year: Not on file       Family History   Problem Relation Age of Onset    Breast Cancer Mother     Coronary Art Dis Mother     Cirrhosis Mother     Cancer Mother 67        breast    Heart Disease Mother     Other Father         cva    Stroke Father     Coronary Art Dis Sister     Elevated Lipids Sister     Other Sister         CABG    High Blood Pressure Sister     High Cholesterol Sister     Heart Disease Sister     High Blood Pressure Sister     Cancer Sister 61        breast    Stroke Sister        Immunization History   Administered Date(s) Administered    Mary Ellen EMMANUEL, Primary or Immunocompromised, PF, 100mcg/0.5mL 04/03/2021    Influenza Vaccine, unspecified formulation 10/16/2016    Influenza Virus Vaccine 10/10/2014    Influenza, High Dose (Fluzone 65 yrs and older) 11/22/2017, 10/12/2018, 10/21/2020    Pneumococcal Conjugate 13-valent ([id number]) 01/19/2016    Pneumococcal Polysaccharide ([id number]) 10/15/2014    Tdap (Boostrix, Adacel) 01/04/2019    Zoster Live (Zostavax) 06/10/2016       Review of Systems  Review of Systems   Constitutional: Negative for activity change, appetite change, chills, diaphoresis, fatigue, fever and unexpected weight change. HENT: Negative for congestion, dental problem, drooling, ear discharge, ear pain, facial swelling, hearing loss, mouth sores, nosebleeds, postnasal drip, rhinorrhea, sinus pressure, sneezing, sore throat, tinnitus, trouble swallowing and voice change. Eyes: Negative for photophobia, pain, discharge, redness, itching and visual disturbance. Respiratory: Negative for apnea, cough, choking, chest tightness, shortness of breath, wheezing and stridor. Cardiovascular: Negative for chest pain, palpitations and leg swelling. Gastrointestinal: Negative for abdominal distention, abdominal pain, anal bleeding, blood in stool, constipation, diarrhea, nausea, rectal pain and vomiting. Genitourinary: Negative for difficulty urinating, dysuria, enuresis, flank pain, frequency, genital sores and hematuria. Musculoskeletal: Negative for arthralgias, back pain, gait problem, joint swelling, myalgias, neck pain and neck stiffness. Skin: Negative for color change, pallor, rash and wound. Neurological: Negative for dizziness, tremors, seizures, syncope, facial asymmetry, speech difficulty, weakness, light-headedness, numbness and headaches. Hematological: Negative for adenopathy. Does not bruise/bleed easily. Psychiatric/Behavioral: Negative for agitation, behavioral problems, confusion, decreased concentration, dysphoric mood, hallucinations, self-injury, sleep disturbance and suicidal ideas. The patient is not nervous/anxious and is not hyperactive. Objective:   Physical Exam  Physical Exam  Vitals reviewed. Constitutional:       General: She is not in acute distress. Appearance: She is well-developed. Eyes:      Conjunctiva/sclera: Conjunctivae normal.      Pupils: Pupils are equal, round, and reactive to light. Neck:      Thyroid: No thyromegaly. Vascular: No JVD. Trachea: No tracheal deviation. Cardiovascular:      Rate and Rhythm: Normal rate and regular rhythm. Heart sounds: Normal heart sounds. No murmur heard. No gallop. Pulmonary:      Effort: Pulmonary effort is normal. No respiratory distress. Breath sounds: Normal breath sounds. No stridor. No wheezing or rales. Chest:      Chest wall: No tenderness. Abdominal:      General: Bowel sounds are normal. There is no distension. Palpations: Abdomen is soft. There is no mass. Tenderness: There is no abdominal tenderness. Musculoskeletal:         General: No tenderness. Lymphadenopathy:      Cervical: No cervical adenopathy. Skin:     General: Skin is warm and dry. Coloration: Skin is not pale. Findings: No erythema or rash. Neurological:      Mental Status: She is alert and oriented to person, place, and time. Cranial Nerves: No cranial nerve deficit. Motor: No abnormal muscle tone. Coordination: Coordination normal.      Deep Tendon Reflexes: Reflexes normal.   Psychiatric:         Behavior: Behavior normal.         Thought Content: Thought content normal.         Judgment: Judgment normal.       No visits with results within 1 Month(s) from this visit.    Latest known visit with results is:   Orders Only on 08/26/2021   Component Date Value Ref Range Status    Cholesterol, Total 08/26/2021 114  0 - 199 mg/dL Final    Triglycerides 08/26/2021 207* 0 - 150 mg/dL Final    HDL 08/26/2021 33* 40 - 60 mg/dL Final    LDL Calculated 08/26/2021 40  <100 mg/dL Final    VLDL Cholesterol Calculated 08/26/2021 41  Not Established mg/dL Final    WBC 08/26/2021 8.2  4.0 - 11.0 K/uL Final    RBC 08/26/2021 4.40  4.00 - 5.20 M/uL Final    Hemoglobin 08/26/2021 13.8  12.0 - 16.0 g/dL Final    Hematocrit 08/26/2021 40.3  36.0 - 48.0 % Final    MCV 08/26/2021 91.5  80.0 - 100.0 fL Final    MCH 08/26/2021 31.4  26.0 - 34.0 pg Final    MCHC 08/26/2021 34.3  31.0 - 36.0 g/dL Final    RDW 08/26/2021 12.6  12.4 - 15.4 % Final    Platelets 55/19/5464 268  135 - 450 K/uL Final    MPV 08/26/2021 8.0  5.0 - 10.5 fL Final    Neutrophils % 08/26/2021 62.6  % Final    Lymphocytes % 08/26/2021 26.1  % Final    Monocytes % 08/26/2021 9.0  % Final    Eosinophils % 08/26/2021 1.8  % Final    Basophils % 08/26/2021 0.5  % Final    Neutrophils Absolute 08/26/2021 5.2  1.7 - 7.7 K/uL Final    Lymphocytes Absolute 08/26/2021 2.1  1.0 - 5.1 K/uL Final    Monocytes Absolute 08/26/2021 0.7  0.0 - 1.3 K/uL Final    Eosinophils Absolute 08/26/2021 0.1  0.0 - 0.6 K/uL Final    Basophils Absolute 08/26/2021 0.0  0.0 - 0.2 K/uL Final    Sodium 08/26/2021 145  136 - 145 mmol/L Final    Potassium 08/26/2021 4.2  3.5 - 5.1 mmol/L Final    Chloride 08/26/2021 104  99 - 110 mmol/L Final    CO2 08/26/2021 27  21 - 32 mmol/L Final    Anion Gap 08/26/2021 14  3 - 16 Final    Glucose 08/26/2021 103* 70 - 99 mg/dL Final    BUN 08/26/2021 15  7 - 20 mg/dL Final    CREATININE 08/26/2021 0.8  0.6 - 1.2 mg/dL Final    GFR Non- 08/26/2021 >60  >60 Final    GFR  08/26/2021 >60  >60 Final    Calcium 08/26/2021 9.8  8.3 - 10.6 mg/dL Final    Total Protein 08/26/2021 6.8  6.4 - 8.2 g/dL Final    Albumin 08/26/2021 4.3  3.4 - 5.0 g/dL Final    Albumin/Globulin Ratio 08/26/2021 1.7  1.1 - 2.2 Final    Total Bilirubin 08/26/2021 0.3  0.0 - 1.0 mg/dL Final    Alkaline Phosphatase 08/26/2021 133* 40 - 129 U/L Final    ALT 08/26/2021 13  10 - 40 U/L Final    AST 08/26/2021 17  15 - 37 U/L Final    Globulin 08/26/2021 2.5  g/dL Final         Assessment and Plan:     Malignant neoplasm of upper-outer quadrant of left female breast ([facility] [address]   Refer to [facility] since prev doc retired    Major depressive disorder, single episode, in full remission ([address]   Well-controlled, continue current medications    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

## 2022-06-30 DIAGNOSIS — I10 BENIGN ESSENTIAL HTN: ICD-10-CM

## 2022-06-30 LAB
A/G RATIO: 2.2 (ref 1.1–2.2)
ALBUMIN SERPL-MCNC: 4.9 G/DL (ref 3.4–5)
ALP BLD-CCNC: 86 U/L (ref 40–129)
ALT SERPL-CCNC: 13 U/L (ref 10–40)
ANION GAP SERPL CALCULATED.3IONS-SCNC: 13 MMOL/L (ref 3–16)
AST SERPL-CCNC: 17 U/L (ref 15–37)
BASOPHILS ABSOLUTE: 0.1 K/UL (ref 0–0.2)
BASOPHILS RELATIVE PERCENT: 1.5 %
BILIRUB SERPL-MCNC: 0.6 MG/DL (ref 0–1)
BUN BLDV-MCNC: 14 MG/DL (ref 7–20)
CALCIUM SERPL-MCNC: 10.2 MG/DL (ref 8.3–10.6)
CHLORIDE BLD-SCNC: 100 MMOL/L (ref 99–110)
CHOLESTEROL, TOTAL: 179 MG/DL (ref 0–199)
CO2: 27 MMOL/L (ref 21–32)
CREAT SERPL-MCNC: 0.7 MG/DL (ref 0.6–1.2)
EOSINOPHILS ABSOLUTE: 0.2 K/UL (ref 0–0.6)
EOSINOPHILS RELATIVE PERCENT: 2.2 %
GFR AFRICAN AMERICAN: >60
GFR NON-AFRICAN AMERICAN: >60
GLUCOSE BLD-MCNC: 114 MG/DL (ref 70–99)
HCT VFR BLD CALC: 44 % (ref 36–48)
HDLC SERPL-MCNC: 49 MG/DL (ref 40–60)
HEMOGLOBIN: 14.9 G/DL (ref 12–16)
LDL CHOLESTEROL CALCULATED: 103 MG/DL
LYMPHOCYTES ABSOLUTE: 1.9 K/UL (ref 1–5.1)
LYMPHOCYTES RELATIVE PERCENT: 26.3 %
MCH RBC QN AUTO: 31.1 PG (ref 26–34)
MCHC RBC AUTO-ENTMCNC: 33.9 G/DL (ref 31–36)
MCV RBC AUTO: 91.7 FL (ref 80–100)
MONOCYTES ABSOLUTE: 0.7 K/UL (ref 0–1.3)
MONOCYTES RELATIVE PERCENT: 10.3 %
NEUTROPHILS ABSOLUTE: 4.3 K/UL (ref 1.7–7.7)
NEUTROPHILS RELATIVE PERCENT: 59.7 %
PDW BLD-RTO: 13 % (ref 12.4–15.4)
PLATELET # BLD: 270 K/UL (ref 135–450)
PMV BLD AUTO: 8.1 FL (ref 5–10.5)
POTASSIUM SERPL-SCNC: 4.3 MMOL/L (ref 3.5–5.1)
RBC # BLD: 4.8 M/UL (ref 4–5.2)
SODIUM BLD-SCNC: 140 MMOL/L (ref 136–145)
TOTAL PROTEIN: 7.1 G/DL (ref 6.4–8.2)
TRIGL SERPL-MCNC: 133 MG/DL (ref 0–150)
VLDLC SERPL CALC-MCNC: 27 MG/DL
WBC # BLD: 7.2 K/UL (ref 4–11)

## 2022-07-11 ENCOUNTER — NURSE ONLY (OUTPATIENT)
Dept: CARDIOLOGY CLINIC | Age: 77
End: 2022-07-11

## 2022-07-11 ENCOUNTER — OFFICE VISIT (OUTPATIENT)
Dept: CARDIOLOGY CLINIC | Age: 77
End: 2022-07-11
Payer: MEDICARE

## 2022-07-11 ENCOUNTER — NURSE ONLY (OUTPATIENT)
Dept: CARDIOLOGY CLINIC | Age: 77
End: 2022-07-11
Payer: MEDICARE

## 2022-07-11 VITALS
SYSTOLIC BLOOD PRESSURE: 138 MMHG | BODY MASS INDEX: 24.45 KG/M2 | DIASTOLIC BLOOD PRESSURE: 70 MMHG | WEIGHT: 138 LBS | HEART RATE: 68 BPM

## 2022-07-11 DIAGNOSIS — I63.9 CEREBROVASCULAR ACCIDENT (CVA), UNSPECIFIED MECHANISM (HCC): Primary | ICD-10-CM

## 2022-07-11 DIAGNOSIS — I63.439 CEREBROVASCULAR ACCIDENT (CVA) DUE TO EMBOLISM OF POSTERIOR CEREBRAL ARTERY WITH INFARCTIONS OF BOTH OCCIPITAL LOBES (HCC): ICD-10-CM

## 2022-07-11 DIAGNOSIS — I10 BENIGN ESSENTIAL HTN: ICD-10-CM

## 2022-07-11 DIAGNOSIS — Z95.818 STATUS POST PLACEMENT OF IMPLANTABLE LOOP RECORDER: ICD-10-CM

## 2022-07-11 PROCEDURE — 1123F ACP DISCUSS/DSCN MKR DOCD: CPT | Performed by: NURSE PRACTITIONER

## 2022-07-11 PROCEDURE — G8420 CALC BMI NORM PARAMETERS: HCPCS | Performed by: NURSE PRACTITIONER

## 2022-07-11 PROCEDURE — G2066 INTER DEVC REMOTE 30D: HCPCS | Performed by: INTERNAL MEDICINE

## 2022-07-11 PROCEDURE — 1090F PRES/ABSN URINE INCON ASSESS: CPT | Performed by: NURSE PRACTITIONER

## 2022-07-11 PROCEDURE — 1036F TOBACCO NON-USER: CPT | Performed by: NURSE PRACTITIONER

## 2022-07-11 PROCEDURE — 93000 ELECTROCARDIOGRAM COMPLETE: CPT | Performed by: NURSE PRACTITIONER

## 2022-07-11 PROCEDURE — 99214 OFFICE O/P EST MOD 30 MIN: CPT | Performed by: NURSE PRACTITIONER

## 2022-07-11 PROCEDURE — G8399 PT W/DXA RESULTS DOCUMENT: HCPCS | Performed by: NURSE PRACTITIONER

## 2022-07-11 PROCEDURE — 93298 REM INTERROG DEV EVAL SCRMS: CPT | Performed by: INTERNAL MEDICINE

## 2022-07-11 PROCEDURE — G8427 DOCREV CUR MEDS BY ELIG CLIN: HCPCS | Performed by: NURSE PRACTITIONER

## 2022-07-11 NOTE — PROGRESS NOTES
[facility], [place], operated by [facility]   Electrophysiology  Office Visit  Date: 7/11/2022    Chief Complaint   Patient presents with    Cerebrovascular Accident    Hypertension       Cardiac HX: Kirsten Kenney is a 64 y.o. woman with a h/o HTN, CVA, (2018), presented 9/6/19 with L leg/arm weakness, MRI confirmed R thalamic acute infarct, CTA of head/neck showed repro vascular disease, reveals a 30-day monitor post hospital stay however did wear a 24-hour Holter in 2018 following her first stroke that showed no AF/AFL, status post ILR on 12/18/2019. Interval History/HPI: Patient is here to follow-up after a CVA and ILR placement. Patient been admitted in 2019 with left arm and leg weakness. An MRI confirmed a right thalamic acute infarct. She was implanted with an ILR in December 2019 to assess for atrial arrhythmias as a cause of her stroke. She had been unwilling to wear an outpatient monitor after being released from the hospital.  She does still experience paresthesia on her entire left side however she has regained strength. Patient had seen Dr. Leon Morales in July 2020 at which time the device recorded an episode of high degree AV block on 6/21/2020 at 9:30 AM that lasted for 4 seconds. She been lying in bed, felt the room spinning and states his symptoms lasted between 1 to 2 hours. She also had an issue with her blood sugar dropping at the same time. Review of her device today shows no symptomatic or patient activated events. She did have 3 episodes of what appears to be an atrial tachycardia. There is no bradycardia or AV block noted. She has not felt any heart racing, palpitations or irregular heartbeats. She has had no chest pain, shortness of breath, PND, orthopnea or lower extremity edema. BP is well controlled in the office today.     Home medications:   Current Outpatient Medications on File Prior to Visit   Medication Sig Dispense Refill    atorvastatin (LIPITOR) 40 MG tablet TAKE ONE TABLET BY MOUTH ONCE NIGHTLY 90 tablet 3    sertraline (ZOLOFT) 50 MG tablet Take 1 tablet by mouth once daily 90 tablet 3    anastrozole (ARIMIDEX) 1 MG tablet TAKE 1 TABLET BY MOUTH  DAILY 90 tablet 3    acetaminophen (TYLENOL) 500 MG tablet Take 500 mg by mouth every 4 hours as needed for Pain      amLODIPine (NORVASC) 5 MG tablet Take 1 tablet by mouth once daily 90 tablet 3    omeprazole (PRILOSEC) 10 MG delayed release capsule Take 1 capsule by mouth once daily 90 capsule 0    calcium carbonate 600 MG TABS tablet Take 1 tablet by mouth daily      Multiple Vitamin (MULTIVITAMIN PO) Take  by mouth.  calcitonin (MIACALCIN) 200 UNIT/ACT nasal spray USE 1 SPRAY IN NOSE EVERY DAY (Patient not taking: Reported on 6/27/2022) 11.1 mL 2     No current facility-administered medications on file prior to visit. Past Medical History:   Diagnosis Date    Vernon's esophagus     Cancer [facility])     breast    Cerebrovascular accident (CVA) due to embolism of posterior cerebral artery with infarctions of both occipital lobes ([facility] [address] 1/10/2018    Colon polyp 1/15/2016    Gallstones     GERD (gastroesophageal reflux disease)     History of therapeutic radiation     Hypertension     Kidney stone     Osteoporoses     Pregnancies     2- vaginal deliveries- 2    Shingles     Tubular adenoma nos     UTI (urinary tract infection)         Past Surgical History:   Procedure Laterality Date    APPENDECTOMY      BLADDER REPAIR  9/09    cah    BREAST LUMPECTOMY      BREAST SURGERY Left 10/26/2017    Left breastpartial mastectomy, needle localization, lymph node dissection    CHOLECYSTECTOMY  1988    COLONOSCOPY  2008,3/9/2017    polyp    HYSTERECTOMY (CERVIX STATUS UNKNOWN)      UPPER GASTROINTESTINAL ENDOSCOPY  2008       Allergies   Allergen Reactions    Sulfa Antibiotics     Sulfamethoxazole-Trimethoprim Other (See Comments)    Vicodin [Hydrocodone-Acetaminophen] Nausea And Vomiting       Social History:  Reviewed. reports that she has never smoked. She has never used smokeless tobacco. She reports current alcohol use of about 1.0 standard drink of alcohol per week. She reports that she does not use drugs. Family History:  Reviewed. family history includes Breast Cancer in her mother; Cancer (age of onset: 61) in her sister; Cancer (age of onset: 67) in her mother; Cirrhosis in her mother; Coronary Art Dis in her mother and sister; Elevated Lipids in her sister; Heart Disease in her mother and sister; High Blood Pressure in her sister and sister; High Cholesterol in her sister; Other in her father and sister; Stroke in her father and sister. Review of System:    · Constitutional: No fevers, chills. · Eyes: No visual changes or diplopia. No scleral icterus. · ENT: No Headaches. No mouth sores or sore throat. · Cardiovascular: No for chest pain, No for dyspnea on exertion, No for palpitations or No for loss of consciousness. No cough, hemoptysis, No for pleuritic pain, or phlebitis. · Respiratory: No for cough or wheezing. No hematemesis. · Gastrointestinal: No abdominal pain, blood in stools. · Genitourinary: No dysuria, or hematuria. · Musculoskeletal: No gait disturbance,    · Integumentary: No rash or pruritis. · Neurological: No headache, change in muscle strength, numbness or tingling. · Psychiatric: No anxiety, or depression. · Endocrine: No temperature intolerance. No excessive thirst, fluid intake, or urination. · Hem/Lymph: No abnormal bruising or bleeding, blood clots or swollen lymph nodes. · Allergic/Immunologic: No nasal congestion or hives. Physical Examination:  Vitals:    07/11/22 1458   BP: 138/70   Pulse: 68         Wt Readings from Last 3 Encounters:   07/11/22 138 lb (62.6 kg)   06/27/22 137 lb 3.2 oz (62.2 kg)   12/06/21 128 lb (58.1 kg)       · Constitutional: Oriented. No distress. · Head: Normocephalic and atraumatic.    · Mouth/Throat: Oropharynx is clear and moist. · Eyes: Conjunctivae clear without jaunduice. PERRL. · Neck: Neck supple. No rigidity. No JVD present. · Cardiovascular: Normal rate, regular rhythm, S1&S2. · Pulmonary/Chest: Bilateral respiratory sounds. No wheezes, No rhonchi. · Abdominal: Soft. Bowel sounds present. No distension, No tenderness. · Musculoskeletal: No tenderness. No edema    · Lymphadenopathy: Has no cervical adenopathy. · Neurological: Alert and oriented. Cranial nerve appears intact, No Gross deficit   · Skin: Skin is warm and dry. No rash noted. · Psychiatric: Has a normal mood, affect and behavior     Labs:  Reviewed. No results for input(s): NA, K, CL, CO2, PHOS, BUN, CREATININE, CA in the last 72 hours. Invalid input(s):  TSH  No results for input(s): WBC, HGB, HCT, MCV, PLT in the last 72 hours. Lab Results   Component Value Date/Time    CKTOTAL 53 12/09/2019 02:55 PM    TROPONINI <0.01 09/06/2019 06:47 PM     No results found for: BNP  Lab Results   Component Value Date/Time    PROTIME 11.0 09/06/2019 06:47 PM    INR 0.96 09/06/2019 06:47 PM     Lab Results   Component Value Date/Time    CHOL 179 06/30/2022 01:37 PM    HDL 49 06/30/2022 01:37 PM    HDL 45 09/30/2011 11:20 AM    TRIG 133 06/30/2022 01:37 PM       ECG: Personally reviewed: NSR, HR 67, , QRS 86, QTc 398    ECHO: 9/9/2019  Summary   Left ventricular cavity size is decreased. There is mild left ventricular   hypertrophy. Overall left ventricular systolic function appears normal with   an ejection fraction of 60-65%. No regional wall motion abnormalities are   noted. Indeterminate diastolic function. Mild tricuspid regurgitation. Estimated pulmonary artery systolic pressure is at 29 mmHg assuming a right   atrial pressure of 3 mmHg.  A bubble study was performed and fails to show   evidence of right to left shunting    Stress Test: N/A    Cardiac Angiography: N/A    Problem List:   Patient Active Problem List    Diagnosis Date Noted    Cerebral vascular disease 09/07/2019    Benign essential HTN 09/12/2019    Acute right-sided low back pain 08/30/2021    Hemiplegia and hemiparesis following other cerebrovascular disease affecting left non-dominant side ([address] 08/26/2021    Major depressive disorder, single episode, in full remission ([address] 08/26/2021    Acute pain of right hip 08/26/2021    Hyperlipidemia 12/09/2019    Hematuria 01/04/2019    Costochondritis 09/07/2018    Visual field defect 03/02/2018    Cervical radiculopathy 03/02/2018    Cerebrovascular accident (CVA) due to embolism of posterior cerebral artery with infarctions of both occipital lobes ([address] 01/10/2018    Malignant neoplasm of upper-outer quadrant of left female breast ([address]     Acquired contour deformity of breast     Depression 11/18/2016    Colon polyp 01/15/2016    Lumbar radiculopathy 11/06/2012    Osteoporosis 09/30/2011    GERD (gastroesophageal reflux disease) 10/14/2010    Vernon esophagus 10/14/2010    Balance disorder 10/14/2010        Assessment:   1. Cerebrovascular accident (CVA), unspecified mechanism ([address]    2. Benign essential HTN       Cardiac HX: Sabiha Thomas is a 64 y.o. woman with a h/o HTN, CVA, (2018), presented 9/6/19 with L leg/arm weakness, MRI confirmed R thalamic acute infarct, CTA of head/neck showed repro vascular disease, reveals a 30-day monitor post hospital stay however did wear a 24-hour Holter in 2018 following her first stroke that showed no AF/AFL, status post ILR on 12/18/2019. CVA  - S/p ILR  - Device check today shows 3 AT episodes with the longest lasting 13 seconds in length with no symptoms patient activated events. - Continue monthly downloads  - F/u in 6 months with EP  - Patient would like device removed when it reaches EOL  - ECG ordered and results personally reviewed     HTN  - BP well controlled in the office  - On amlodipine 5 mg QD    EF of 02-68%  No systolic HF  No known CAD  No known AF  No Tobacco use. All questions and concerns were addressed to the patient/family. Alternatives to my treatment were discussed. The note was completed using EMR. Every effort was made to ensure accuracy; however, inadvertent computerized transcription errors may be present. Patient received education regarding their diagnosis, treatment and medications while in the office today. Chary Sen       I  have spent 30 minutes in care of the patient including direct face to face time, chart preparation, reviewing diagnostic testing, other provider notes and coordinating patient care.

## 2022-07-11 NOTE — PROGRESS NOTES
ILR for stroke. No AF recorded to date. 6/21/20-symptomatic pause recorded showing high grade avb. Ov 7/7/20-addressed pause. Last remote today, 7.11.2022. Transmission shows no new arrhythmias. Patient remains on asa 81 mg.  CARLOS PETERSON to review. See Paceart report under the Cardiology tab. We will continue to monitor remotely.

## 2022-07-11 NOTE — PROGRESS NOTES
ILR implanted for stroke. No AF recorded to date. 6/21/20-symptomatic pause recorded showing high grade avb. Ov 7/7/20-addressed pause.     Remote transmission received from patient's monitor at home. Remote Linq report shows no arrhythmias. End of 31-day monitoring period 7/11/22. EP physician to review. See PACEART report under Cardiology tab. Will continue to monitor remotely.

## 2022-08-30 ENCOUNTER — NURSE ONLY (OUTPATIENT)
Dept: CARDIOLOGY CLINIC | Age: 77
End: 2022-08-30
Payer: MEDICARE

## 2022-08-30 DIAGNOSIS — Z45.09 ENCOUNTER FOR LOOP RECORDER CHECK: ICD-10-CM

## 2022-08-30 DIAGNOSIS — I63.9 CRYPTOGENIC STROKE (HCC): Primary | ICD-10-CM

## 2022-08-30 PROCEDURE — 93298 REM INTERROG DEV EVAL SCRMS: CPT | Performed by: INTERNAL MEDICINE

## 2022-08-30 PROCEDURE — G2066 INTER DEVC REMOTE 30D: HCPCS | Performed by: INTERNAL MEDICINE

## 2022-08-30 NOTE — PROGRESS NOTES
ILR implanted for stroke. No AF recorded to date. 6/21/20-symptomatic pause recorded showing high grade avb. Ov 7/7/20-addressed pause. Remote transmission received from patient's monitor at home. Remote Linq report shows 1 tachy event which appears to demonstrate ST.  EP physician to review. See PACEART report under Cardiology tab. Will continue to monitor remotely. (End of 31-day monitoring period 8/30/22).

## 2022-09-22 ENCOUNTER — TELEPHONE (OUTPATIENT)
Dept: PHARMACY | Facility: CLINIC | Age: 77
End: 2022-09-22

## 2022-09-22 NOTE — TELEPHONE ENCOUNTER
[facility] CLINICAL PHARMACY: ADHERENCE REVIEW  Identified care gap per [employer]: fills at [address]: Statin adherence    Last Visit: 06.27.22      [address] Records claims through 09.12.22 (Prior Year Anibal Wynn = FAILED; YTD Anibal Wynn =  72%; Potential Fail Date: 09.24.22 ):   Atorvastatin last filled on 06.23.22 for 90 day supply. Next refill due: 09.21.22    Per  [place] Portal:  (same as above). Lab Results   Component Value Date    CHOL 179 06/30/2022    TRIG 133 06/30/2022    HDL 49 06/30/2022    LDLCALC 103 (H) 06/30/2022     ALT   Date Value Ref Range Status   06/30/2022 13 10 - 40 U/L Final     AST   Date Value Ref Range Status   06/30/2022 17 15 - 37 U/L Final     The ASCVD Risk score (Carey Harley., et al., 2013) failed to calculate for the following reasons: The patient has a prior MI or stroke diagnosis     PLAN  The following are interventions that have been identified:   - Patient overdue refilling Atorvastatin and active on home medication list.    - [address] has refill available for . Attempting to reach patient to review. Left message asking for return call.          Future Appointments   Date Time Provider Julieta Osorio   10/24/2022  1:00 PM Fior James REMOTE TRANSMISSION [place] [facility]   1/17/2023  2:30 PM SCHEDULE, [facility]   1/17/2023  3:00 PM Dufm Cheadle, APRN - NIKIA [place] [facility]       Thu Escalera, [address] [place] [address]   [address] [place] [address],[address] Floor Clinical Pharmacy  Phone: toll free [phone]

## 2022-09-23 RX ORDER — AMLODIPINE BESYLATE 5 MG/1
TABLET ORAL
Qty: 90 TABLET | Refills: 0 | Status: SHIPPED | OUTPATIENT
Start: 2022-09-23

## 2022-09-26 RX ORDER — ANASTROZOLE 1 MG/1
1 TABLET ORAL DAILY
Qty: 90 TABLET | Refills: 0 | Status: SHIPPED | OUTPATIENT
Start: 2022-09-26

## 2022-10-24 ENCOUNTER — NURSE ONLY (OUTPATIENT)
Dept: CARDIOLOGY CLINIC | Age: 77
End: 2022-10-24
Payer: MEDICARE

## 2022-10-24 DIAGNOSIS — Z95.818 IMPLANTABLE LOOP RECORDER PRESENT: ICD-10-CM

## 2022-10-24 DIAGNOSIS — I63.439 CEREBROVASCULAR ACCIDENT (CVA) DUE TO EMBOLISM OF POSTERIOR CEREBRAL ARTERY WITH INFARCTIONS OF BOTH OCCIPITAL LOBES (HCC): Primary | ICD-10-CM

## 2022-10-24 PROCEDURE — G2066 INTER DEVC REMOTE 30D: HCPCS | Performed by: INTERNAL MEDICINE

## 2022-10-24 PROCEDURE — 93298 REM INTERROG DEV EVAL SCRMS: CPT | Performed by: INTERNAL MEDICINE

## 2022-10-24 NOTE — PROGRESS NOTES
ILR implanted for stroke. No AF recorded to date. 6/21/20-symptomatic pause recorded showing high grade avb. Ov 7/7/20-addressed pause. Remote transmission received from patient's monitor at home. Remote Linq report shows no arrhythmias/ or events. EP physician to review. See PACEART report under Cardiology tab. Will continue to monitor remotely.      (End of 31-day monitoring period 10/24/22)

## 2022-12-07 ENCOUNTER — HOSPITAL ENCOUNTER (OUTPATIENT)
Dept: MAMMOGRAPHY | Age: 77
Discharge: HOME | End: 2022-12-07
Payer: MEDICARE

## 2022-12-07 VITALS — HEIGHT: 63 IN | WEIGHT: 135 LBS | BODY MASS INDEX: 23.92 KG/M2

## 2022-12-07 DIAGNOSIS — Z12.31 VISIT FOR SCREENING MAMMOGRAM: ICD-10-CM

## 2022-12-07 PROCEDURE — 77067 SCR MAMMO BI INCL CAD: CPT

## 2022-12-13 ENCOUNTER — NURSE ONLY (OUTPATIENT)
Dept: CARDIOLOGY CLINIC | Age: 77
End: 2022-12-13
Payer: MEDICARE

## 2022-12-13 DIAGNOSIS — I63.439 CEREBROVASCULAR ACCIDENT (CVA) DUE TO EMBOLISM OF POSTERIOR CEREBRAL ARTERY WITH INFARCTIONS OF BOTH OCCIPITAL LOBES (HCC): Primary | ICD-10-CM

## 2022-12-13 DIAGNOSIS — Z95.818 IMPLANTABLE LOOP RECORDER PRESENT: ICD-10-CM

## 2022-12-13 PROCEDURE — 93298 REM INTERROG DEV EVAL SCRMS: CPT | Performed by: INTERNAL MEDICINE

## 2022-12-13 PROCEDURE — G2066 INTER DEVC REMOTE 30D: HCPCS | Performed by: INTERNAL MEDICINE

## 2022-12-15 RX ORDER — ANASTROZOLE 1 MG/1
1 TABLET ORAL DAILY
Qty: 90 TABLET | Refills: 3 | Status: SHIPPED | OUTPATIENT
Start: 2022-12-15

## 2022-12-21 ENCOUNTER — TELEPHONE (OUTPATIENT)
Dept: PHARMACY | Facility: CLINIC | Age: 77
End: 2022-12-21

## 2022-12-21 NOTE — TELEPHONE ENCOUNTER
[facility] CLINICAL [facility]: ADHERENCE REVIEW  Identified care gap per [employer]: fills at [address]: Statin adherence    Last Visit: 22    ASSESSMENT  STATIN ADHERENCE    Fail date 22  ATORVASTATIN TAB 40MG last filled on 22 for 90 day supply. Next refill due: 22    Per [facility]:   ATORVASTATIN TAB 40MG last picked up on 22 for 90 day supply. 1 refills remaining. Billed through [facility] will process a refill with co-pay of $0.00    Lab Results   Component Value Date    CHOL 179 2022    TRIG 133 2022    HDL 49 2022    LDLCALC 103 (H) 2022     ALT   Date Value Ref Range Status   2022 13 10 - 40 U/L Final     AST   Date Value Ref Range Status   2022 17 15 - 37 U/L Final     The ASCVD Risk score (Natalee ZIMMERMAN, et al., 2019) failed to calculate for the following reasons: The patient has a prior MI or stroke diagnosis     PLAN  No patient out reach planned at this time.     Pt appears to be adherent at this time     Future Appointments   Date Time Provider Julieta Osorio   2023  2:30 PM SCHEDULE, [facility]   2023  3:00 PM JULES Smith CNP [place] [facility]   2023  3:30 PM SCHEDULE, Demetrio Ratliff REMOTE TRANSMISSION [place] [facility]       [address] [facility]   [address] [place] [address]  // Department, toll free [phone], Option 2    For Pharmacy Admin Tracking Only    Program: [address] in place:  No  Recommendation Provided To: Pharmacy: 1  Intervention Detail: Adherence Monitorin  Gap Closed?: No   Intervention Accepted By: Pharmacy: 1  Time Spent (min): 5

## 2022-12-22 RX ORDER — AMLODIPINE BESYLATE 5 MG/1
TABLET ORAL
Qty: 90 TABLET | Refills: 0 | Status: SHIPPED | OUTPATIENT
Start: 2022-12-22

## 2022-12-22 NOTE — PROGRESS NOTES
ILR implanted for stroke. No AF recorded to date. 6/21/20-symptomatic pause recorded showing high grade avb. Ov 7/7/20-addressed pause. Remote transmission received from patient's monitor at home. Remote Linq report shows no arrhythmias/ or events. EP physician to review. See PACEART report under Cardiology tab. Will continue to monitor remotely.      (End of 31-day monitoring period 12/13/21)

## 2023-01-13 NOTE — PROGRESS NOTES
[facility]   Electrophysiology  Office Visit  Date: 1/17/2023    Chief Complaint   Patient presents with    Cerebrovascular Accident    Hypertension     Cardiac HX: Darlene Berrios is a 76 y.o. woman with a h/o HTN, CVA, (2018), presented 9/6/19 with L leg/arm weakness, MRI confirmed R thalamic acute infarct, CTA of head/neck showed repro vascular disease, reveals a 30-day monitor post hospital stay however did wear a 24-hour Holter in 2018 following her first stroke that showed no AF/AFL, status post ILR on 12/18/2019. Interval History/HPI: Patient is here for follow-up after CVA and ILR placement. Patient had presented in 2019 with left arm and leg weakness. She had an MRI that confirmed a right Thal make acute infarct. She was implanted with an ILR in December 2019 to assess for atrial arrhythmias as a cause of her stroke. She had been unwilling to wear an outpatient monitor after being released from the hospital.  Patient had seen Dr. Vinay Sanchez in July 2020 at which time the device recorded an episode of high degree AV block on 6/21/2020 at 9:30 AM that lasted for 4 seconds. She had been lying in bed, felt the room spinning and stated her symptoms lasted between 1 to 2 hours. She also had an issue with her blood sugar dropping at the same time. Today she presents in NSR with a heart rate of 78 bpm.  Review of her device today shows 1 tachycardic episode in July that lasted about 7 seconds. Appears to be in AT versus an SVT with a heart rate of 176 bpm.  She was asymptomatic. She has not felt any heart racing, palpitations or irregular heartbeats. Blood pressure is well controlled in the office today. Remains on amlodipine 5 mg daily. She denies any chest pain, shortness of breath, PND, orthopnea or lower extremity edema.     Home medications:   Current Outpatient Medications on File Prior to Visit   Medication Sig Dispense Refill    amLODIPine (NORVASC) 5 MG tablet TAKE ONE TABLET BY MOUTH DAILY 90 tablet 0    anastrozole (ARIMIDEX) 1 MG tablet TAKE 1 TABLET BY MOUTH  DAILY 90 tablet 3    atorvastatin (LIPITOR) 40 MG tablet TAKE ONE TABLET BY MOUTH ONCE NIGHTLY 90 tablet 3    sertraline (ZOLOFT) 50 MG tablet Take 1 tablet by mouth once daily 90 tablet 3    acetaminophen (TYLENOL) 500 MG tablet Take 500 mg by mouth every 4 hours as needed for Pain      omeprazole (PRILOSEC) 10 MG delayed release capsule Take 1 capsule by mouth once daily 90 capsule 0    calcium carbonate 600 MG TABS tablet Take 1 tablet by mouth daily      Multiple Vitamin (MULTIVITAMIN PO) Take  by mouth.      calcitonin (MIACALCIN) 200 UNIT/ACT nasal spray USE 1 SPRAY IN NOSE EVERY DAY (Patient not taking: No sig reported) 11.1 mL 2     No current facility-administered medications on file prior to visit. Past Medical History:   Diagnosis Date    Vernon's esophagus     Cancer [facility])     breast    Cerebrovascular accident (CVA) due to embolism of posterior cerebral artery with infarctions of both occipital lobes ([facility] [address] 1/10/2018    Colon polyp 1/15/2016    Gallstones     GERD (gastroesophageal reflux disease)     History of therapeutic radiation     Hypertension     Kidney stone     Osteoporoses     Pregnancies     2- vaginal deliveries- 2    Shingles     Tubular adenoma nos     UTI (urinary tract infection)         Past Surgical History:   Procedure Laterality Date    APPENDECTOMY      BLADDER REPAIR  9/09    [facility]    BREAST LUMPECTOMY      BREAST SURGERY Left 10/26/2017    Left breastpartial mastectomy, needle localization, lymph node dissection    CHOLECYSTECTOMY  1988    COLONOSCOPY  2008,3/9/2017    polyp    HYSTERECTOMY (CERVIX STATUS UNKNOWN)      UPPER GASTROINTESTINAL ENDOSCOPY  2008       Allergies   Allergen Reactions    Sulfa Antibiotics     Sulfamethoxazole-Trimethoprim Other (See Comments)    Vicodin [Hydrocodone-Acetaminophen] Nausea And Vomiting       Social History:  Reviewed. reports that she has never smoked.  She has never used smokeless tobacco. She reports current alcohol use of about 1.0 standard drink per week. She reports that she does not use drugs. Family History:  Reviewed. family history includes Breast Cancer in her mother; Cancer (age of onset: 61) in her sister; Cancer (age of onset: 67) in her mother; Cirrhosis in her mother; Coronary Art Dis in her mother and sister; Elevated Lipids in her sister; Heart Disease in her mother and sister; High Blood Pressure in her sister and sister; High Cholesterol in her sister; Other in her father and sister; Stroke in her father and sister. Review of System:    Constitutional: No fevers, chills. Eyes: No visual changes or diplopia. No scleral icterus. ENT: No Headaches. No mouth sores or sore throat. Cardiovascular: No for chest pain, No for dyspnea on exertion, No for palpitations or No for loss of consciousness. No cough, hemoptysis, No for pleuritic pain, or phlebitis. Respiratory: No for cough or wheezing. No hematemesis. Gastrointestinal: No abdominal pain, blood in stools. Genitourinary: No dysuria, or hematuria. Musculoskeletal: No gait disturbance,    Integumentary: No rash or pruritis. Neurological: No headache, change in muscle strength, numbness or tingling. Psychiatric: No anxiety, or depression. Endocrine: No temperature intolerance. No excessive thirst, fluid intake, or urination. Hem/Lymph: No abnormal bruising or bleeding, blood clots or swollen lymph nodes. Allergic/Immunologic: No nasal congestion or hives. Physical Examination:  Vitals:    01/17/23 1414   BP: 122/68   Pulse: 78           Wt Readings from Last 3 Encounters:   01/17/23 142 lb 3.2 oz (64.5 kg)   12/07/22 135 lb (61.2 kg)   07/11/22 138 lb (62.6 kg)       Constitutional: Oriented. No distress. Head: Normocephalic and atraumatic. Mouth/Throat: Oropharynx is clear and moist.   Eyes: Conjunctivae clear without jaunduice. PERRL. Neck: Neck supple. No rigidity.  No JVD present. Cardiovascular: Normal rate, regular rhythm, S1&S2. Pulmonary/Chest: Bilateral respiratory sounds. No wheezes, No rhonchi. Abdominal: Soft. Bowel sounds present. No distension, No tenderness. Musculoskeletal: No tenderness. No edema    Lymphadenopathy: Has no cervical adenopathy. Neurological: Alert and oriented. Cranial nerve appears intact, No Gross deficit   Skin: Skin is warm and dry. No rash noted. Psychiatric: Has a normal mood, affect and behavior     Labs:  Reviewed. No results for input(s): NA, K, CL, CO2, PHOS, BUN, CREATININE, CA in the last 72 hours. Invalid input(s):  TSH  No results for input(s): WBC, HGB, HCT, MCV, PLT in the last 72 hours. Lab Results   Component Value Date/Time    CKTOTAL 53 12/09/2019 02:55 PM    TROPONINI <0.01 09/06/2019 06:47 PM     No results found for: BNP  Lab Results   Component Value Date/Time    PROTIME 11.0 09/06/2019 06:47 PM    INR 0.96 09/06/2019 06:47 PM     Lab Results   Component Value Date/Time    CHOL 179 06/30/2022 01:37 PM    HDL 49 06/30/2022 01:37 PM    HDL 45 09/30/2011 11:20 AM    TRIG 133 06/30/2022 01:37 PM       ECG: Personally reviewed: NSR, HR 78, , QRS 86, QTc 417    ECHO: 9/9/2019  Summary   Left ventricular cavity size is decreased. There is mild left ventricular   hypertrophy. Overall left ventricular systolic function appears normal with   an ejection fraction of 60-65%. No regional wall motion abnormalities are   noted. Indeterminate diastolic function. Mild tricuspid regurgitation. Estimated pulmonary artery systolic pressure is at 29 mmHg assuming a right   atrial pressure of 3 mmHg.  A bubble study was performed and fails to show   evidence of right to left shunting    Stress Test: N/A    Cardiac Angiography: N/A    Problem List:   Patient Active Problem List    Diagnosis Date Noted    Cerebral vascular disease 09/07/2019    Benign essential HTN 09/12/2019    Acute right-sided low back pain 08/30/2021 Hemiplegia and hemiparesis following other cerebrovascular disease affecting left non-dominant side ([address] 08/26/2021    Major depressive disorder, single episode, in full remission ([address] 08/26/2021    Acute pain of right hip 08/26/2021    Hyperlipidemia 12/09/2019    Hematuria 01/04/2019    Costochondritis 09/07/2018    Visual field defect 03/02/2018    Cervical radiculopathy 03/02/2018    Cerebrovascular accident (CVA) due to embolism of posterior cerebral artery with infarctions of both occipital lobes ([address] 01/10/2018    Malignant neoplasm of upper-outer quadrant of left female breast ([address]     Acquired contour deformity of breast     Depression 11/18/2016    Colon polyp 01/15/2016    Lumbar radiculopathy 11/06/2012    Osteoporosis 09/30/2011    GERD (gastroesophageal reflux disease) 10/14/2010    Vernon esophagus 10/14/2010    Balance disorder 10/14/2010        Assessment:   1. Cerebrovascular accident (CVA), unspecified mechanism ([address]    2. Status post placement of implantable loop recorder    3. Essential hypertension         Cardiac HX: Rachell Harrison is a 76 y.o. woman with a h/o HTN, CVA, (2018), presented 9/6/19 with L leg/arm weakness, MRI confirmed R thalamic acute infarct, CTA of head/neck showed repro vascular disease, reveals a 30-day monitor post hospital stay however did wear a 24-hour Holter in 2018 following her first stroke that showed no AF/AFL, status post ILR on 12/18/2019. CVA  - S/p ILR  - Device check today shows 1 tachy episode that appears to be an AT/SVT lasting 7 secs in length  - Continue monthly downloads  - F/u in 6 months with EP  - Patient would like device removed when it reaches EOL  - ECG ordered and results personally reviewed     HTN  - BP well controlled in the office  - On amlodipine 5 mg QD    EF of 02-84%  No systolic HF  No known CAD  No known AF  No Tobacco use. All questions and concerns were addressed to the patient/family.  Alternatives to my treatment were discussed. The note was completed using EMR. Every effort was made to ensure accuracy; however, inadvertent computerized transcription errors may be present. Patient received education regarding their diagnosis, treatment and medications while in the office today. [address]      I  have spent 30 minutes in care of the patient including direct face to face time, chart preparation, reviewing diagnostic testing, other provider notes and coordinating patient care.

## 2023-01-17 ENCOUNTER — NURSE ONLY (OUTPATIENT)
Dept: CARDIOLOGY CLINIC | Age: 78
End: 2023-01-17

## 2023-01-17 ENCOUNTER — OFFICE VISIT (OUTPATIENT)
Dept: CARDIOLOGY CLINIC | Age: 78
End: 2023-01-17
Payer: MEDICARE

## 2023-01-17 VITALS
SYSTOLIC BLOOD PRESSURE: 122 MMHG | WEIGHT: 142.2 LBS | HEART RATE: 78 BPM | BODY MASS INDEX: 25.19 KG/M2 | DIASTOLIC BLOOD PRESSURE: 68 MMHG

## 2023-01-17 DIAGNOSIS — Z95.818 STATUS POST PLACEMENT OF IMPLANTABLE LOOP RECORDER: ICD-10-CM

## 2023-01-17 DIAGNOSIS — I10 ESSENTIAL HYPERTENSION: ICD-10-CM

## 2023-01-17 DIAGNOSIS — I63.9 CEREBROVASCULAR ACCIDENT (CVA), UNSPECIFIED MECHANISM (HCC): Primary | ICD-10-CM

## 2023-01-17 PROCEDURE — 99214 OFFICE O/P EST MOD 30 MIN: CPT | Performed by: NURSE PRACTITIONER

## 2023-01-17 PROCEDURE — G8484 FLU IMMUNIZE NO ADMIN: HCPCS | Performed by: NURSE PRACTITIONER

## 2023-01-17 PROCEDURE — G8417 CALC BMI ABV UP PARAM F/U: HCPCS | Performed by: NURSE PRACTITIONER

## 2023-01-17 PROCEDURE — G8427 DOCREV CUR MEDS BY ELIG CLIN: HCPCS | Performed by: NURSE PRACTITIONER

## 2023-01-17 PROCEDURE — 3078F DIAST BP <80 MM HG: CPT | Performed by: NURSE PRACTITIONER

## 2023-01-17 PROCEDURE — 93000 ELECTROCARDIOGRAM COMPLETE: CPT | Performed by: NURSE PRACTITIONER

## 2023-01-17 PROCEDURE — 1090F PRES/ABSN URINE INCON ASSESS: CPT | Performed by: NURSE PRACTITIONER

## 2023-01-17 PROCEDURE — 1123F ACP DISCUSS/DSCN MKR DOCD: CPT | Performed by: NURSE PRACTITIONER

## 2023-01-17 PROCEDURE — 1036F TOBACCO NON-USER: CPT | Performed by: NURSE PRACTITIONER

## 2023-01-17 PROCEDURE — 3074F SYST BP LT 130 MM HG: CPT | Performed by: NURSE PRACTITIONER

## 2023-01-17 PROCEDURE — G8399 PT W/DXA RESULTS DOCUMENT: HCPCS | Performed by: NURSE PRACTITIONER

## 2023-01-17 NOTE — PROGRESS NOTES
ILR implanted for stroke. No AF recorded to date. 6/21/20-symptomatic pause recorded showing high grade avb. Ov 7/7/20-addressed pause. Last remote 12.13.2022    Transmission shows 1 tachy episode occurring on 07.25.2022 x 07 secs w/V rate of 176 bpm.  Current ECG illustrates NSR/ST w/ectopy. Time adjusted. EP physician will review. See Paceart report under the Cardiology tab. Patient will see NPFW today. We will continue to monitor remotely.

## 2023-02-02 ENCOUNTER — NURSE ONLY (OUTPATIENT)
Dept: CARDIOLOGY CLINIC | Age: 78
End: 2023-02-02

## 2023-02-02 DIAGNOSIS — Z95.818 STATUS POST PLACEMENT OF IMPLANTABLE LOOP RECORDER: Primary | ICD-10-CM

## 2023-02-02 DIAGNOSIS — I63.439 CEREBROVASCULAR ACCIDENT (CVA) DUE TO EMBOLISM OF POSTERIOR CEREBRAL ARTERY WITH INFARCTIONS OF BOTH OCCIPITAL LOBES (HCC): ICD-10-CM

## 2023-02-10 NOTE — PROGRESS NOTES
ILR implanted for stroke. No AF recorded to date. 6/21/20-symptomatic pause recorded showing high grade AVB-OV 7/7/20 addressed pause. Remote transmission received from patient's ILR monitor at home. Remote Linq report shows no arrhythmias. End of 31-day monitoring period 2/2/23. EP physician to review. See PACEART report under Cardiology tab. Will continue to monitor remotely.

## 2023-03-03 ENCOUNTER — OFFICE VISIT (OUTPATIENT)
Dept: FAMILY MEDICINE CLINIC | Age: 78
End: 2023-03-03

## 2023-03-03 VITALS
BODY MASS INDEX: 25.41 KG/M2 | OXYGEN SATURATION: 97 % | HEIGHT: 63 IN | HEART RATE: 85 BPM | WEIGHT: 143.4 LBS | SYSTOLIC BLOOD PRESSURE: 130 MMHG | DIASTOLIC BLOOD PRESSURE: 70 MMHG

## 2023-03-03 DIAGNOSIS — R10.32 LLQ ABDOMINAL PAIN: ICD-10-CM

## 2023-03-03 DIAGNOSIS — Z00.00 MEDICARE ANNUAL WELLNESS VISIT, SUBSEQUENT: Primary | ICD-10-CM

## 2023-03-03 DIAGNOSIS — I69.854 HEMIPLEGIA AND HEMIPARESIS FOLLOWING OTHER CEREBROVASCULAR DISEASE AFFECTING LEFT NON-DOMINANT SIDE (HCC): ICD-10-CM

## 2023-03-03 DIAGNOSIS — E78.2 MIXED HYPERLIPIDEMIA: ICD-10-CM

## 2023-03-03 DIAGNOSIS — I10 BENIGN ESSENTIAL HTN: ICD-10-CM

## 2023-03-03 DIAGNOSIS — F32.5 MAJOR DEPRESSIVE DISORDER, SINGLE EPISODE, IN FULL REMISSION (HCC): ICD-10-CM

## 2023-03-03 DIAGNOSIS — C50.412 MALIGNANT NEOPLASM OF UPPER-OUTER QUADRANT OF LEFT BREAST IN FEMALE, ESTROGEN RECEPTOR POSITIVE (HCC): ICD-10-CM

## 2023-03-03 DIAGNOSIS — Z17.0 MALIGNANT NEOPLASM OF UPPER-OUTER QUADRANT OF LEFT BREAST IN FEMALE, ESTROGEN RECEPTOR POSITIVE (HCC): ICD-10-CM

## 2023-03-03 DIAGNOSIS — Z00.00 WELL ADULT EXAM: ICD-10-CM

## 2023-03-03 DIAGNOSIS — I63.439 CEREBROVASCULAR ACCIDENT (CVA) DUE TO EMBOLISM OF POSTERIOR CEREBRAL ARTERY WITH INFARCTIONS OF BOTH OCCIPITAL LOBES (HCC): ICD-10-CM

## 2023-03-03 DIAGNOSIS — G95.9 CERVICAL MYELOPATHY (HCC): ICD-10-CM

## 2023-03-03 DIAGNOSIS — H53.40 VISUAL FIELD DEFECT: ICD-10-CM

## 2023-03-03 DIAGNOSIS — M54.12 CERVICAL RADICULOPATHY: ICD-10-CM

## 2023-03-03 LAB
A/G RATIO: 1.9 (ref 1.1–2.2)
ALBUMIN SERPL-MCNC: 4.6 G/DL (ref 3.4–5)
ALP BLD-CCNC: 81 U/L (ref 40–129)
ALT SERPL-CCNC: 14 U/L (ref 10–40)
ANION GAP SERPL CALCULATED.3IONS-SCNC: 13 MMOL/L (ref 3–16)
AST SERPL-CCNC: 16 U/L (ref 15–37)
BACTERIA: ABNORMAL /HPF
BASOPHILS ABSOLUTE: 0.1 K/UL (ref 0–0.2)
BASOPHILS RELATIVE PERCENT: 1 %
BILIRUB SERPL-MCNC: 0.5 MG/DL (ref 0–1)
BILIRUBIN URINE: NEGATIVE
BLOOD, URINE: NEGATIVE
BUN BLDV-MCNC: 12 MG/DL (ref 7–20)
C-REACTIVE PROTEIN: <3 MG/L (ref 0–5.1)
CALCIUM SERPL-MCNC: 9.4 MG/DL (ref 8.3–10.6)
CHLORIDE BLD-SCNC: 101 MMOL/L (ref 99–110)
CHOLESTEROL, TOTAL: 119 MG/DL (ref 0–199)
CLARITY: ABNORMAL
CO2: 28 MMOL/L (ref 21–32)
COLOR: YELLOW
CREAT SERPL-MCNC: 0.6 MG/DL (ref 0.6–1.2)
EOSINOPHILS ABSOLUTE: 0.3 K/UL (ref 0–0.6)
EOSINOPHILS RELATIVE PERCENT: 4 %
EPITHELIAL CELLS, UA: 5 /HPF (ref 0–5)
GFR SERPL CREATININE-BSD FRML MDRD: >60 ML/MIN/{1.73_M2}
GLUCOSE BLD-MCNC: 125 MG/DL (ref 70–99)
GLUCOSE URINE: NEGATIVE MG/DL
HCT VFR BLD CALC: 41.7 % (ref 36–48)
HDLC SERPL-MCNC: 44 MG/DL (ref 40–60)
HEMOGLOBIN: 14.4 G/DL (ref 12–16)
HYALINE CASTS: 2 /LPF (ref 0–8)
KETONES, URINE: NEGATIVE MG/DL
LDL CHOLESTEROL CALCULATED: 48 MG/DL
LEUKOCYTE ESTERASE, URINE: ABNORMAL
LYMPHOCYTES ABSOLUTE: 2.2 K/UL (ref 1–5.1)
LYMPHOCYTES RELATIVE PERCENT: 27 %
MCH RBC QN AUTO: 30.6 PG (ref 26–34)
MCHC RBC AUTO-ENTMCNC: 34.4 G/DL (ref 31–36)
MCV RBC AUTO: 89 FL (ref 80–100)
MICROSCOPIC EXAMINATION: YES
MONOCYTES ABSOLUTE: 0.7 K/UL (ref 0–1.3)
MONOCYTES RELATIVE PERCENT: 9 %
NEUTROPHILS ABSOLUTE: 4.7 K/UL (ref 1.7–7.7)
NEUTROPHILS RELATIVE PERCENT: 59 %
NITRITE, URINE: NEGATIVE
PDW BLD-RTO: 12.4 % (ref 12.4–15.4)
PH UA: 6 (ref 5–8)
PLATELET # BLD: 308 K/UL (ref 135–450)
PMV BLD AUTO: 8.8 FL (ref 5–10.5)
POTASSIUM SERPL-SCNC: 3.6 MMOL/L (ref 3.5–5.1)
PROTEIN UA: ABNORMAL MG/DL
RBC # BLD: 4.69 M/UL (ref 4–5.2)
RBC # BLD: NORMAL 10*6/UL
RBC UA: 5 /HPF (ref 0–4)
SODIUM BLD-SCNC: 142 MMOL/L (ref 136–145)
SPECIFIC GRAVITY UA: 1.02 (ref 1–1.03)
TOTAL PROTEIN: 7 G/DL (ref 6.4–8.2)
TRIGL SERPL-MCNC: 133 MG/DL (ref 0–150)
URINE REFLEX TO CULTURE: YES
URINE TYPE: ABNORMAL
UROBILINOGEN, URINE: 0.2 E.U./DL
VLDLC SERPL CALC-MCNC: 27 MG/DL
WBC # BLD: 8 K/UL (ref 4–11)
WBC UA: 13 /HPF (ref 0–5)

## 2023-03-03 SDOH — ECONOMIC STABILITY: HOUSING INSECURITY
IN THE LAST 12 MONTHS, WAS THERE A TIME WHEN YOU DID NOT HAVE A STEADY PLACE TO SLEEP OR SLEPT IN A SHELTER (INCLUDING NOW)?: NO

## 2023-03-03 SDOH — ECONOMIC STABILITY: FOOD INSECURITY: WITHIN THE PAST 12 MONTHS, THE FOOD YOU BOUGHT JUST DIDN'T LAST AND YOU DIDN'T HAVE MONEY TO GET MORE.: NEVER TRUE

## 2023-03-03 SDOH — ECONOMIC STABILITY: INCOME INSECURITY: HOW HARD IS IT FOR YOU TO PAY FOR THE VERY BASICS LIKE FOOD, HOUSING, MEDICAL CARE, AND HEATING?: NOT HARD AT ALL

## 2023-03-03 SDOH — ECONOMIC STABILITY: FOOD INSECURITY: WITHIN THE PAST 12 MONTHS, YOU WORRIED THAT YOUR FOOD WOULD RUN OUT BEFORE YOU GOT MONEY TO BUY MORE.: NEVER TRUE

## 2023-03-03 ASSESSMENT — PATIENT HEALTH QUESTIONNAIRE - PHQ9
SUM OF ALL RESPONSES TO PHQ QUESTIONS 1-9: 0
1. LITTLE INTEREST OR PLEASURE IN DOING THINGS: 0
SUM OF ALL RESPONSES TO PHQ QUESTIONS 1-9: 0
SUM OF ALL RESPONSES TO PHQ QUESTIONS 1-9: 0
6. FEELING BAD ABOUT YOURSELF - OR THAT YOU ARE A FAILURE OR HAVE LET YOURSELF OR YOUR FAMILY DOWN: 0
3. TROUBLE FALLING OR STAYING ASLEEP: 0
SUM OF ALL RESPONSES TO PHQ9 QUESTIONS 1 & 2: 0
9. THOUGHTS THAT YOU WOULD BE BETTER OFF DEAD, OR OF HURTING YOURSELF: 0
7. TROUBLE CONCENTRATING ON THINGS, SUCH AS READING THE NEWSPAPER OR WATCHING TELEVISION: 0
10. IF YOU CHECKED OFF ANY PROBLEMS, HOW DIFFICULT HAVE THESE PROBLEMS MADE IT FOR YOU TO DO YOUR WORK, TAKE CARE OF THINGS AT HOME, OR GET ALONG WITH OTHER PEOPLE: 0
8. MOVING OR SPEAKING SO SLOWLY THAT OTHER PEOPLE COULD HAVE NOTICED. OR THE OPPOSITE, BEING SO FIGETY OR RESTLESS THAT YOU HAVE BEEN MOVING AROUND A LOT MORE THAN USUAL: 0
2. FEELING DOWN, DEPRESSED OR HOPELESS: 0
SUM OF ALL RESPONSES TO PHQ QUESTIONS 1-9: 0
4. FEELING TIRED OR HAVING LITTLE ENERGY: 0
5. POOR APPETITE OR OVEREATING: 0

## 2023-03-03 ASSESSMENT — LIFESTYLE VARIABLES
HOW OFTEN DO YOU HAVE A DRINK CONTAINING ALCOHOL: NEVER
HOW MANY STANDARD DRINKS CONTAINING ALCOHOL DO YOU HAVE ON A TYPICAL DAY: PATIENT DOES NOT DRINK

## 2023-03-03 NOTE — PROGRESS NOTES
Medicare Annual Wellness Visit    Alvarez Li is here for Medicare AWV    Assessment & Plan   Cervical myelopathy ([facility] [address]  Hemiplegia and hemiparesis following other cerebrovascular disease affecting left non-dominant side ([address]  Assessment & Plan:   Well-controlled, continue current medications  Major depressive disorder, single episode, in full remission [facility])  Assessment & Plan:   Well-controlled, continue current medications  Malignant neoplasm of upper-outer quadrant of left breast in female, estrogen receptor positive ([facility] [address]  Assessment & Plan:   Monitored by specialist- no acute findings meriting change in the plan  Benign essential HTN  Assessment & Plan:   Well-controlled, continue current medications  Cerebrovascular accident (CVA) due to embolism of posterior cerebral artery with infarctions of both occipital lobes ([facility] [address]  Assessment & Plan:   Well-controlled, continue current medications  Visual field defect  Assessment & Plan:   Well-controlled, continue current medications  Cervical radiculopathy  Assessment & Plan:   Well-controlled, continue current medications  Mixed hyperlipidemia  Assessment & Plan:   Well-controlled, continue current medications    Recommendations for Preventive Services Due: see orders and patient instructions/AVS.  Recommended screening schedule for the next 5-10 years is provided to the patient in written form: see Patient Instructions/AVS.     No follow-ups on file.      Subjective   The following acute and/or chronic problems were also addressed today:  LLQ abd pain for 3 weeks--unchanged --no assoc sx    Benign essential HTN   Well-controlled, continue current medications    Malignant neoplasm of upper-outer quadrant of left female breast ([facility] [address]   Monitored by specialist- no acute findings meriting change in the plan    Major depressive disorder, single episode, in full remission ([address]   Well-controlled, continue current medications    Hemiplegia and hemiparesis following other cerebrovascular disease affecting left non-dominant side (HCC)   Well-controlled, continue current medications    Cerebrovascular accident (CVA) due to embolism of posterior cerebral artery with infarctions of both occipital lobes (HCC)   Well-controlled, continue current medications    Visual field defect   Well-controlled, continue current medications    Cervical radiculopathy   Well-controlled, continue current medications    Hyperlipidemia   Well-controlled, continue current medications      Patient's complete Health Risk Assessment and screening values have been reviewed and are found in Flowsheets. The following problems were reviewed today and where indicated follow up appointments were made and/or referrals ordered.    Positive Risk Factor Screenings with Interventions:               General HRA Questions:  Select all that apply: (!) New or Increased Pain    Pain Interventions:  See above       Weight and Activity:  Physical Activity: Inactive    Days of Exercise per Week: 0 days    Minutes of Exercise per Session: 0 min     On average, how many days per week do you engage in moderate to strenuous exercise (like a brisk walk)?: 0 days  Have you lost any weight without trying in the past 3 months?: No  Body mass index: (!) 25.4      Inactivity Interventions:  Discussed walking          ADL's:   Patient reports needing help with:  Select all that apply: (!) Transportation, Food Preparation, Shopping, Banking/Finances, Housekeeping, Laundry    Interventions:  Lives at assist living                    Objective   Vitals:    03/03/23 0947   BP: 130/70   Pulse: 85   SpO2: 97%   Weight: 143 lb 6.4 oz (65 kg)   Height: 5' 3\" (1.6 m)      Body mass index is 25.4 kg/m².      General Appearance: alert and oriented to person, place and time, well developed and well- nourished, in no acute distress  Skin: warm and dry, no rash or erythema  Head: normocephalic and atraumatic  Eyes: pupils equal, round, and reactive to  light, extraocular eye movements intact, conjunctivae normal  ENT: tympanic membrane, external ear and ear canal normal bilaterally, nose without deformity, nasal mucosa and turbinates normal without polyps  Neck: supple and non-tender without mass, no thyromegaly or thyroid nodules, no cervical lymphadenopathy  Pulmonary/Chest: clear to auscultation bilaterally- no wheezes, rales or rhonchi, normal air movement, no respiratory distress  Cardiovascular: normal rate, regular rhythm, normal S1 and S2, no murmurs, rubs, clicks, or gallops, distal pulses intact, no carotid bruits  Abdomen: soft, non-tender, non-distended, normal bowel sounds, no masses or organomegaly  Extremities: no cyanosis, clubbing or edema  Musculoskeletal: normal range of motion, no joint swelling, deformity or tenderness  Neurologic: reflexes normal and symmetric, no cranial nerve deficit, gait, coordination and speech normal       Allergies   Allergen Reactions    Sulfa Antibiotics     Sulfamethoxazole-Trimethoprim Other (See Comments)    Vicodin [Hydrocodone-Acetaminophen] Nausea And Vomiting     Prior to Visit Medications    Medication Sig Taking? Authorizing Provider   sertraline (ZOLOFT) 50 MG tablet TAKE ONE TABLET BY MOUTH DAILY Yes Ani Son MD   amLODIPine (NORVASC) 5 MG tablet TAKE ONE TABLET BY MOUTH DAILY Yes Ani Son MD   anastrozole (ARIMIDEX) 1 MG tablet TAKE 1 TABLET BY MOUTH  DAILY Yes Ani Son MD   atorvastatin (LIPITOR) 40 MG tablet TAKE ONE TABLET BY MOUTH ONCE NIGHTLY Yes Ani Son MD   acetaminophen (TYLENOL) 500 MG tablet Take 500 mg by mouth every 4 hours as needed for Pain Yes Historical Provider, MD   omeprazole (PRILOSEC) 10 MG delayed release capsule Take 1 capsule by mouth once daily Yes Ani Son MD   calcium carbonate 600 MG TABS tablet Take 1 tablet by mouth daily Yes Historical Provider, MD   Multiple Vitamin (MULTIVITAMIN PO) Take  by mouth.  Yes Sabiha Chanlder MD   calcitonin (MIACALCIN) 200 UNIT/ACT nasal spray USE 1 SPRAY IN NOSE EVERY DAY  Patient not taking: No sig reported  Sabiha Chandler MD       CareTeam (Including outside providers/suppliers regularly involved in providing care):   Patient Care Team:  Sabiha Chandler MD as PCP - Avni Suarez MD as PCP - [facility]  Stephanie Beavers MD as Consulting Physician (Hematology and Oncology)  Ree Martínez MD as Consulting Physician (Radiation Oncology)  Carmen Alves DO as Surgeon (General Surgery)     Reviewed and updated this visit:  Tobacco  Allergies  Meds  Problems  Med Hx  Surg Hx  Soc Hx  Fam Hx               Sabiha Chandler MD

## 2023-03-03 NOTE — PATIENT INSTRUCTIONS
Learning About Being Active as an Older Adult  Why is being active important as you get older? Being active is one of the best things you can do for your health. And it's never too late to start. Being active--or getting active, if you aren't already--has definite benefits. It can:  Give you more energy,  Keep your mind sharp. Improve balance to reduce your risk of falls. Help you manage chronic illness with fewer medicines. No matter how old you are, how fit you are, or what health problems you have, there is a form of activity that will work for you. And the more physical activity you can do, the better your overall health will be. What kinds of activity can help you stay healthy? Being more active will make your daily activities easier. Physical activity includes planned exercise and things you do in daily life. There are four types of activity:  Aerobic. Doing aerobic activity makes your heart and lungs strong. Includes walking, dancing, and gardening. Aim for at least 2½ hours spread throughout the week. It improves your energy and can help you sleep better. Muscle-strengthening. This type of activity can help maintain muscle and strengthen bones. Includes climbing stairs, using resistance bands, and lifting or carrying heavy loads. Aim for at least twice a week. It can help protect the knees and other joints. Stretching. Stretching gives you better range of motion in joints and muscles. Includes upper arm stretches, calf stretches, and gentle yoga. Aim for at least twice a week, preferably after your muscles are warmed up from other activities. It can help you function better in daily life. Balancing. This helps you stay coordinated and have good posture. Includes heel-to-toe walking, renee chi, and certain types of yoga. Aim for at least 3 days a week. It can reduce your risk of falling.   Even if you have a hard time meeting the recommendations, it's better to be more active than less active. All activity done in each category counts toward your weekly total. You'd be surprised how daily things like carrying groceries, keeping up with grandchildren, and taking the stairs can add up. What keeps you from being active? If you've had a hard time being more active, you're not alone. Maybe you remember being able to do more. Or maybe you've never thought of yourself as being active. It's frustrating when you can't do the things you want. Being more active can help. What's holding you back? Getting started. Have a goal, but break it into easy tasks. Small steps build into big accomplishments. Staying motivated. If you feel like skipping your activity, remember your goal. Maybe you want to move better and stay independent. Every activity gets you one step closer. Not feeling your best.  Start with 5 minutes of an activity you enjoy. Prove to yourself you can do it. As you get comfortable, increase your time. You may not be where you want to be. But you're in the process of getting there. Everyone starts somewhere. How can you find safe ways to stay active? Talk with your doctor about any physical challenges you're facing. Make a plan with your doctor if you have a health problem or aren't sure how to get started with activity. If you're already active, ask your doctor if there is anything you should change to stay safe as your body and health change. If you tend to feel dizzy after you take medicine, avoid activity at that time. Try being active before you take your medicine. This will reduce your risk of falls. If you plan to be active at home, make sure to clear your space before you get started. Remove things like TV cords, coffee tables, and throw rugs. It's safest to have plenty of space to move freely. The key to getting more active is to take it slow and steady. Try to improve only a little bit at a time.  Pick just one area to improve on at first. And if an activity hurts, stop and talk to your doctor. Where can you learn more? Go to http://www.burleson.com/ and enter P600 to learn more about \"Learning About Being Active as an Older Adult. \"  Current as of: October 10, 2022               Content Version: 13.5  © 2588-7738 Healthwise, [employer]. Care instructions adapted under license by [employer]. If you have questions about a medical condition or this instruction, always ask your healthcare professional. [address] any warranty or liability for your use of this information. Learning About Activities of Daily Living  What are activities of daily living? Activities of daily living (ADLs) are the basic self-care tasks you do every day. As you age, and if you have health problems, you may find that it's harder to do these things for yourself. That's when you may need some help. Your doctor uses ADLs to measure how much help you need. Knowing what you can and can't do for yourself is an important first step to getting help. And when you have the help you need, you can stay as independent as possible. Your doctor will want to know if you are able to do tasks such as: Take a bath or shower without help. Go to the bathroom by yourself. Dress and undress without help. Shave, comb your hair, and brush teeth on your own. Get in and out of bed or a chair without help. Feed yourself without help. If you are having trouble doing basic self-care tasks, talk with your doctor. You may want to bring a caregiver or family member who can help the doctor understand your needs and abilities. How will a doctor assess your ADLs? Asking about ADLs is part of a routine health checkup your doctor will likely do as you age.  Your health check might be done in a doctor's office, in your home, or at a hospital. The goal is to find out if you are having any problems that could make your health problems worse or that make it unsafe for you to be on your own. To measure your ADLs, your doctor will ask how hard it is for you to do routine tasks. He or she may also want to know if you have changed the way you do a task because of a health problem. He or she may watch how you:  Walk back and forth. Keep your balance while you stand or walk. Move from sitting to standing or from a bed to a chair. Button or unbutton a shirt or sweater. Remove and put on your shoes. It's normal to feel a little worried or anxious if you find you can't do all the things you used to be able to do. Talking with your doctor about ADLs isn't a test that you either pass or fail. It's just a way to get more information about your health and safety. Follow-up care is a key part of your treatment and safety. Be sure to make and go to all appointments, and call your doctor if you are having problems. It's also a good idea to know your test results and keep a list of the medicines you take. Current as of: October 6, 2021               Content Version: 13.5  © 7397-0845 Healthwise, [employer]. Care instructions adapted under license by [facility] ([facility]). If you have questions about a medical condition or this instruction, always ask your healthcare professional. [address] any warranty or liability for your use of this information. Advance Directives: Care Instructions  Overview  An advance directive is a legal way to state your wishes at the end of your life. It tells your family and your doctor what to do if you can't say what you want. There are two main types of advance directives. You can change them any time your wishes change. Living will. This form tells your family and your doctor your wishes about life support and other treatment. The form is also called a declaration. Medical power of . This form lets you name a person to make treatment decisions for you when you can't speak for yourself.  This person is called a health care agent (health care proxy, health care surrogate). The form is also called a durable power of  for health care. If you do not have an advance directive, decisions about your medical care may be made by a family member, or by a doctor or a  who doesn't know you. It may help to think of an advance directive as a gift to the people who care for you. If you have one, they won't have to make tough decisions by themselves. For more information, including forms for your state, see the [address] website (www.caringinfo.org/planning/advance-directives/). Follow-up care is a key part of your treatment and safety. Be sure to make and go to all appointments, and call your doctor if you are having problems. It's also a good idea to know your test results and keep a list of the medicines you take. What should you include in an advance directive? Many states have a unique advance directive form. (It may ask you to address specific issues.) Or you might use a universal form that's approved by many states. If your form doesn't tell you what to address, it may be hard to know what to include in your advance directive. Use the questions below to help you get started. Who do you want to make decisions about your medical care if you are not able to? What life-support measures do you want if you have a serious illness that gets worse over time or can't be cured? What are you most afraid of that might happen? (Maybe you're afraid of having pain, losing your independence, or being kept alive by machines.)  Where would you prefer to die? (Your home? A hospital? A nursing home?)  Do you want to donate your organs when you die? Do you want certain Gnosticist practices performed before you die? When should you call for help? Be sure to contact your doctor if you have any questions. Where can you learn more?   Go to http://www.[employer].com/ and enter R264 to learn more about \"Advance Directives: Care Instructions. \"  Current as of: June 16, 2022               Content Version: 13.5  © 0468-8624 [employer]. Care instructions adapted under license by [facility] ([facility]). If you have questions about a medical condition or this instruction, always ask your healthcare professional. [address] any warranty or liability for your use of this information. A Healthy Heart: Care Instructions  Your Care Instructions     Coronary artery disease, also called heart disease, occurs when a substance called plaque builds up in the vessels that supply oxygen-rich blood to your heart muscle. This can narrow the blood vessels and reduce blood flow. A heart attack happens when blood flow is completely blocked. A high-fat diet, smoking, and other factors increase the risk of heart disease. Your doctor has found that you have a chance of having heart disease. You can do lots of things to keep your heart healthy. It may not be easy, but you can change your diet, exercise more, and quit smoking. These steps really work to lower your chance of heart disease. Follow-up care is a key part of your treatment and safety. Be sure to make and go to all appointments, and call your doctor if you are having problems. It's also a good idea to know your test results and keep a list of the medicines you take. How can you care for yourself at home? Diet    Use less salt when you cook and eat. This helps lower your blood pressure. Taste food before salting. Add only a little salt when you think you need it. With time, your taste buds will adjust to less salt.     Eat fewer snack items, fast foods, canned soups, and other high-salt, high-fat, processed foods.     Read food labels and try to avoid saturated and trans fats. They increase your risk of heart disease by raising cholesterol levels.     Limit the amount of solid fat-butter, margarine, and shortening-you eat.  Use olive, peanut, or canola oil when you cook. Bake, broil, and steam foods instead of frying them.     Eat a variety of fruit and vegetables every day. Dark green, deep orange, red, or yellow fruits and vegetables are especially good for you. Examples include spinach, carrots, peaches, and berries.     Foods high in fiber can reduce your cholesterol and provide important vitamins and minerals. High-fiber foods include whole-grain cereals and breads, oatmeal, beans, brown rice, citrus fruits, and apples.     Eat lean proteins. Heart-healthy proteins include seafood, lean meats and poultry, eggs, beans, peas, nuts, seeds, and soy products.     Limit drinks and foods with added sugar. These include candy, desserts, and soda pop. Lifestyle changes    If your doctor recommends it, get more exercise. Walking is a good choice. Bit by bit, increase the amount you walk every day. Try for at least 30 minutes on most days of the week. You also may want to swim, bike, or do other activities.     Do not smoke. If you need help quitting, talk to your doctor about stop-smoking programs and medicines. These can increase your chances of quitting for good. Quitting smoking may be the most important step you can take to protect your heart. It is never too late to quit.     Limit alcohol to 2 drinks a day for men and 1 drink a day for women. Too much alcohol can cause health problems.     Manage other health problems such as diabetes, high blood pressure, and high cholesterol. If you think you may have a problem with alcohol or drug use, talk to your doctor. Medicines    Take your medicines exactly as prescribed. Call your doctor if you think you are having a problem with your medicine.     If your doctor recommends aspirin, take the amount directed each day. Make sure you take aspirin and not another kind of pain reliever, such as acetaminophen (Tylenol). When should you call for help? Call 911 if you have symptoms of a heart attack.  These may include:    Chest pain or pressure, or a strange feeling in the chest.     Sweating.     Shortness of breath.     Pain, pressure, or a strange feeling in the back, neck, jaw, or upper belly or in one or both shoulders or arms.     Lightheadedness or sudden weakness.     A fast or irregular heartbeat. After you call 911, the  may tell you to chew 1 adult-strength or 2 to 4 low-dose aspirin. Wait for an ambulance. Do not try to drive yourself. Watch closely for changes in your health, and be sure to contact your doctor if you have any problems. Where can you learn more? Go to http://www.burleson.com/ and enter F075 to learn more about \"A Healthy Heart: Care Instructions. \"  Current as of: September 7, 2022               Content Version: 13.5  © 5008-9464 Healthwise, [employer]. Care instructions adapted under license by [facility] ([facility]). If you have questions about a medical condition or this instruction, always ask your healthcare professional. [place] [address] any warranty or liability for your use of this information. Personalized Preventive Plan for Keshav Rayo - 3/3/2023  Medicare offers a range of preventive health benefits. Some of the tests and screenings are paid in full while other may be subject to a deductible, co-insurance, and/or copay. Some of these benefits include a comprehensive review of your medical history including lifestyle, illnesses that may run in your family, and various assessments and screenings as appropriate. After reviewing your medical record and screening and assessments performed today your provider may have ordered immunizations, labs, imaging, and/or referrals for you. A list of these orders (if applicable) as well as your Preventive Care list are included within your After Visit Summary for your review.     Other Preventive Recommendations:    A preventive eye exam performed by an eye specialist is recommended every 1-2 years to screen for glaucoma; cataracts, macular degeneration, and other eye disorders. A preventive dental visit is recommended every 6 months. Try to get at least 150 minutes of exercise per week or 10,000 steps per day on a pedometer . Order or download the FREE \"Exercise & Physical Activity: Your Everyday Guide\" from [employer]. Call 9-790.768.5478 or search [employer] online. You need 9397-3878 mg of calcium and 3756-1348 IU of vitamin D per day. It is possible to meet your calcium requirement with diet alone, but a vitamin D supplement is usually necessary to meet this goal.  When exposed to the sun, use a sunscreen that protects against both UVA and UVB radiation with an SPF of 30 or greater. Reapply every 2 to 3 hours or after sweating, drying off with a towel, or swimming. Always wear a seat belt when traveling in a car. Always wear a helmet when riding a bicycle or motorcycle.

## 2023-03-04 DIAGNOSIS — R73.9 HYPERGLYCEMIA: Primary | ICD-10-CM

## 2023-03-04 DIAGNOSIS — R73.9 HYPERGLYCEMIA: ICD-10-CM

## 2023-03-04 RX ORDER — CEPHALEXIN 500 MG/1
500 CAPSULE ORAL 3 TIMES DAILY
Qty: 21 CAPSULE | Refills: 0 | Status: SHIPPED | OUTPATIENT
Start: 2023-03-04 | End: 2023-03-11

## 2023-03-05 LAB
ESTIMATED AVERAGE GLUCOSE: 102.5 MG/DL
HBA1C MFR BLD: 5.2 %
URINE CULTURE, ROUTINE: NORMAL

## 2023-03-24 RX ORDER — AMLODIPINE BESYLATE 5 MG/1
TABLET ORAL
Qty: 90 TABLET | Refills: 3 | Status: SHIPPED | OUTPATIENT
Start: 2023-03-24

## 2023-04-06 ENCOUNTER — NURSE ONLY (OUTPATIENT)
Dept: CARDIOLOGY CLINIC | Age: 78
End: 2023-04-06

## 2023-04-06 DIAGNOSIS — I63.9 CRYPTOGENIC STROKE (HCC): ICD-10-CM

## 2023-04-06 DIAGNOSIS — Z98.890 HISTORY OF LOOP RECORDER: Primary | ICD-10-CM

## 2023-04-07 NOTE — PROGRESS NOTES
ILR implanted for stroke. No AF recorded to date. 6/21/20-symptomatic pause recorded showing high grade AVB-OV 7/7/20 addressed pause. Remote transmission received from patient's ILR monitor at home. Remote Linq report shows no arrhythmias. EP physician to review. See PACEART report under Cardiology tab. Will continue to monitor remotely. (End of 31-day monitoring period 4/6/23).

## 2023-05-22 PROCEDURE — 99285 EMERGENCY DEPT VISIT HI MDM: CPT

## 2023-05-22 PROCEDURE — 93005 ELECTROCARDIOGRAM TRACING: CPT | Performed by: EMERGENCY MEDICINE

## 2023-05-22 ASSESSMENT — PAIN DESCRIPTION - FREQUENCY: FREQUENCY: INTERMITTENT

## 2023-05-22 ASSESSMENT — PAIN SCALES - GENERAL: PAINLEVEL_OUTOF10: 4

## 2023-05-22 ASSESSMENT — PAIN DESCRIPTION - PAIN TYPE: TYPE: ACUTE PAIN

## 2023-05-22 ASSESSMENT — PAIN - FUNCTIONAL ASSESSMENT: PAIN_FUNCTIONAL_ASSESSMENT: 0-10

## 2023-05-22 ASSESSMENT — PAIN DESCRIPTION - DESCRIPTORS: DESCRIPTORS: STABBING

## 2023-05-22 ASSESSMENT — PAIN DESCRIPTION - LOCATION: LOCATION: CHEST

## 2023-05-22 ASSESSMENT — PAIN DESCRIPTION - ORIENTATION: ORIENTATION: MID

## 2023-05-23 ENCOUNTER — APPOINTMENT (OUTPATIENT)
Dept: CT IMAGING | Age: 78
End: 2023-05-23
Payer: MEDICARE

## 2023-05-23 ENCOUNTER — APPOINTMENT (OUTPATIENT)
Dept: GENERAL RADIOLOGY | Age: 78
End: 2023-05-23
Payer: MEDICARE

## 2023-05-23 ENCOUNTER — HOSPITAL ENCOUNTER (EMERGENCY)
Age: 78
Discharge: HOME | End: 2023-05-23
Attending: EMERGENCY MEDICINE
Payer: MEDICARE

## 2023-05-23 VITALS
OXYGEN SATURATION: 98 % | WEIGHT: 143 LBS | TEMPERATURE: 97.7 F | HEIGHT: 63 IN | RESPIRATION RATE: 20 BRPM | HEART RATE: 75 BPM | SYSTOLIC BLOOD PRESSURE: 146 MMHG | DIASTOLIC BLOOD PRESSURE: 121 MMHG | BODY MASS INDEX: 25.34 KG/M2

## 2023-05-23 DIAGNOSIS — R07.9 CHEST PAIN, UNSPECIFIED TYPE: Primary | ICD-10-CM

## 2023-05-23 LAB
ALBUMIN SERPL-MCNC: 4.7 G/DL (ref 3.4–5)
ALBUMIN/GLOB SERPL: 1.6 {RATIO} (ref 1.1–2.2)
ALP SERPL-CCNC: 90 U/L (ref 40–129)
ALT SERPL-CCNC: 22 U/L (ref 10–40)
ANION GAP SERPL CALCULATED.3IONS-SCNC: 12 MMOL/L (ref 3–16)
AST SERPL-CCNC: 20 U/L (ref 15–37)
BILIRUB SERPL-MCNC: 0.5 MG/DL (ref 0–1)
BUN SERPL-MCNC: 17 MG/DL (ref 7–20)
CALCIUM SERPL-MCNC: 9.7 MG/DL (ref 8.3–10.6)
CHLORIDE SERPL-SCNC: 104 MMOL/L (ref 99–110)
CO2 SERPL-SCNC: 29 MMOL/L (ref 21–32)
CREAT SERPL-MCNC: 0.6 MG/DL (ref 0.6–1.2)
DEPRECATED RDW RBC AUTO: 12.7 % (ref 12.4–15.4)
EKG ATRIAL RATE: 67 BPM
EKG DIAGNOSIS: NORMAL
EKG P AXIS: 77 DEGREES
EKG P-R INTERVAL: 170 MS
EKG Q-T INTERVAL: 426 MS
EKG QRS DURATION: 88 MS
EKG QTC CALCULATION (BAZETT): 450 MS
EKG R AXIS: -15 DEGREES
EKG T AXIS: 34 DEGREES
EKG VENTRICULAR RATE: 67 BPM
GFR SERPLBLD CREATININE-BSD FMLA CKD-EPI: >60 ML/MIN/{1.73_M2}
GLUCOSE SERPL-MCNC: 108 MG/DL (ref 70–99)
HCT VFR BLD AUTO: 42 % (ref 36–48)
HGB BLD-MCNC: 14.5 G/DL (ref 12–16)
LV EF: 76 %
LVEF MODALITY: NORMAL
MAGNESIUM SERPL-MCNC: 2 MG/DL (ref 1.8–2.4)
MCH RBC QN AUTO: 30.9 PG (ref 26–34)
MCHC RBC AUTO-ENTMCNC: 34.5 G/DL (ref 31–36)
MCV RBC AUTO: 89.3 FL (ref 80–100)
NT-PROBNP SERPL-MCNC: 60 PG/ML (ref 0–449)
PLATELET # BLD AUTO: 348 K/UL (ref 135–450)
PMV BLD AUTO: 8 FL (ref 5–10.5)
POTASSIUM SERPL-SCNC: 3.4 MMOL/L (ref 3.5–5.1)
PROT SERPL-MCNC: 7.7 G/DL (ref 6.4–8.2)
RBC # BLD AUTO: 4.7 M/UL (ref 4–5.2)
SODIUM SERPL-SCNC: 145 MMOL/L (ref 136–145)
TROPONIN, HIGH SENSITIVITY: 9 NG/L (ref 0–14)
TROPONIN, HIGH SENSITIVITY: 9 NG/L (ref 0–14)
WBC # BLD AUTO: 11 K/UL (ref 4–11)

## 2023-05-23 PROCEDURE — 2580000003 HC RX 258: Performed by: EMERGENCY MEDICINE

## 2023-05-23 PROCEDURE — 84484 ASSAY OF TROPONIN QUANT: CPT

## 2023-05-23 PROCEDURE — 80053 COMPREHEN METABOLIC PANEL: CPT

## 2023-05-23 PROCEDURE — 96374 THER/PROPH/DIAG INJ IV PUSH: CPT

## 2023-05-23 PROCEDURE — 2500000003 HC RX 250 WO HCPCS: Performed by: EMERGENCY MEDICINE

## 2023-05-23 PROCEDURE — 71046 X-RAY EXAM CHEST 2 VIEWS: CPT

## 2023-05-23 PROCEDURE — 74174 CTA ABD&PLVS W/CONTRAST: CPT

## 2023-05-23 PROCEDURE — 83735 ASSAY OF MAGNESIUM: CPT

## 2023-05-23 PROCEDURE — 6360000004 HC RX CONTRAST MEDICATION: Performed by: EMERGENCY MEDICINE

## 2023-05-23 PROCEDURE — A4216 STERILE WATER/SALINE, 10 ML: HCPCS | Performed by: EMERGENCY MEDICINE

## 2023-05-23 PROCEDURE — 78452 HT MUSCLE IMAGE SPECT MULT: CPT

## 2023-05-23 PROCEDURE — 83880 ASSAY OF NATRIURETIC PEPTIDE: CPT

## 2023-05-23 PROCEDURE — 85027 COMPLETE CBC AUTOMATED: CPT

## 2023-05-23 PROCEDURE — 93017 CV STRESS TEST TRACING ONLY: CPT

## 2023-05-23 PROCEDURE — 3430000000 HC RX DIAGNOSTIC RADIOPHARMACEUTICAL: Performed by: EMERGENCY MEDICINE

## 2023-05-23 PROCEDURE — 6370000000 HC RX 637 (ALT 250 FOR IP): Performed by: EMERGENCY MEDICINE

## 2023-05-23 PROCEDURE — A9502 TC99M TETROFOSMIN: HCPCS | Performed by: EMERGENCY MEDICINE

## 2023-05-23 RX ORDER — SODIUM CHLORIDE 9 MG/ML
INJECTION, SOLUTION INTRAVENOUS PRN
Status: DISCONTINUED | OUTPATIENT
Start: 2023-05-23 | End: 2023-05-23 | Stop reason: HOSPADM

## 2023-05-23 RX ORDER — SODIUM CHLORIDE 0.9 % (FLUSH) 0.9 %
5-40 SYRINGE (ML) INJECTION PRN
Status: DISCONTINUED | OUTPATIENT
Start: 2023-05-23 | End: 2023-05-23 | Stop reason: HOSPADM

## 2023-05-23 RX ORDER — SODIUM CHLORIDE 0.9 % (FLUSH) 0.9 %
5-40 SYRINGE (ML) INJECTION EVERY 12 HOURS SCHEDULED
Status: DISCONTINUED | OUTPATIENT
Start: 2023-05-23 | End: 2023-05-23 | Stop reason: HOSPADM

## 2023-05-23 RX ORDER — ASPIRIN 81 MG/1
324 TABLET, CHEWABLE ORAL ONCE
Status: COMPLETED | OUTPATIENT
Start: 2023-05-23 | End: 2023-05-23

## 2023-05-23 RX ADMIN — LIDOCAINE HYDROCHLORIDE: 20 SOLUTION ORAL; TOPICAL at 05:04

## 2023-05-23 RX ADMIN — TETROFOSMIN 10 MILLICURIE: 1.38 INJECTION, POWDER, LYOPHILIZED, FOR SOLUTION INTRAVENOUS at 10:05

## 2023-05-23 RX ADMIN — ASPIRIN 324 MG: 81 TABLET, CHEWABLE ORAL at 01:37

## 2023-05-23 RX ADMIN — IOPAMIDOL 75 ML: 755 INJECTION, SOLUTION INTRAVENOUS at 02:45

## 2023-05-23 RX ADMIN — FAMOTIDINE 20 MG: 10 INJECTION, SOLUTION INTRAVENOUS at 05:06

## 2023-05-23 RX ADMIN — TETROFOSMIN 30 MILLICURIE: 1.38 INJECTION, POWDER, LYOPHILIZED, FOR SOLUTION INTRAVENOUS at 11:35

## 2023-05-23 ASSESSMENT — ENCOUNTER SYMPTOMS
COUGH: 0
VOMITING: 0
SHORTNESS OF BREATH: 0
ABDOMINAL PAIN: 0
WHEEZING: 0
NAUSEA: 0
EYE PAIN: 0
DIARRHEA: 0

## 2023-05-23 ASSESSMENT — PAIN SCALES - GENERAL: PAINLEVEL_OUTOF10: 0

## 2023-05-23 ASSESSMENT — HEART SCORE: ECG: 0

## 2023-05-23 NOTE — ED PROVIDER NOTES
[facility] Emergency Department  ED Observation Progress Note   Emergency Physicians          DiagnosticEvaluation      Chest pain -stress test       Assessment      Miko Dominguez continues to be managed in accordance with the observation clinical guidelines for chest pain. An update of her clinical problem list includes:    - Chest pain, unspecified       Plan      - Nuc stress test - performed, read pending           Subjective      Miko Dominguez has been admitted to the observation unit for 17 hours. Serial assessments of her clinical progress include:    Exams  Troponins  Cardiac monitoring  Stress test    Patient without new acute complaints. Continues to have mild central chest pain, no triggers or relief. Physical Examination      Physical Exam    Alert female, in no distress. Respirations even and unlabored. Heart regular rate and rhythm on cardiac monitor.        Nena Cao MD  05/23/23 [id number]

## 2023-05-23 NOTE — ED PROVIDER NOTES
[address] ENCOUNTER          ATTENDING PHYSICIAN NOTE       Date of evaluation: 5/22/2023    Chief Complaint     Chest Pain      History of Present Illness     Arman Pallas is a 76 y.o. female who presents chief complaint of chest pain. The patient states that she has chest pain throughout the day today. It initially woke her from sleep this morning. The pain is intermittent without specific exacerbating or alleviating factors. It is sharp in nature. When it comes on it is located substernally and last 2 to 3 minutes and then seems to resolve. She denies any dyspnea. There is no exertional component to the pain. No lower extremity swelling. No cough, fever, or chills. She has never had pain like this previously. ASSESSMENT / PLAN  (MEDICAL DECISION MAKING)     INITIAL VITALS: BP: (!) 184/73, Temp: 97.7 °F (36.5 °C), Pulse: 81, Respirations: 18, SpO2: 94 %      Arman Pallas is a 76 y.o. female presents with chest pain. Is intermittent and sharp in nature and felt to be somewhat atypical for ACS. EKG shows no evidence of ST elevation or acute ischemic changes. High-sensitivity troponins were overall reassuring. Chest x-ray was clear. She is modestly hypertensive with intermittent sharp chest pain and therefore aortic dissection was considered. CTA showed no signs of aortic dissection or aneurysm. Low suspicion for PE in this patient given the intermittent nature of the symptoms without any dyspnea. No tachycardia or hypoxemia. Pain is not pleuritic in nature. Ultimately her EKG and troponins were reassuring from an ACS standpoint but she does have a heart score of 4. I discussed this with her and she was amenable to be placed into ED observation to obtain a stress test for further cardiac restratification. Given her strong history of GERD we did give 1 dose of IV Pepcid and a GI cocktail.   That may be contributing but I still think that it would be prudent

## 2023-05-23 NOTE — ED NOTES
Discharge instructions provided to patient by RN at bedside. No further concerns addressed at this time.       Felecia Hendricks RN  05/23/23 [id number]
She is well-developed. HENT:      Head: Normocephalic and atraumatic. Eyes:      Pupils: Pupils are equal, round, and reactive to light. Neck:      Vascular: No JVD. Cardiovascular:      Rate and Rhythm: Normal rate and regular rhythm. Heart sounds: Normal heart sounds. No murmur heard. No friction rub. No gallop. Pulmonary:      Effort: Pulmonary effort is normal. No respiratory distress. Breath sounds: Normal breath sounds. No wheezing or rales. Abdominal:      General: There is no distension. Palpations: Abdomen is soft. Tenderness: There is no abdominal tenderness. Musculoskeletal:         General: Normal range of motion. Cervical back: Normal range of motion. Skin:     Findings: No rash. Neurological:      Mental Status: She is alert and oriented to person, place, and time.           Antione Ureña MD  05/23/23 [id number]

## 2023-05-23 NOTE — DISCHARGE INSTRUCTIONS
We saw you in the hospital for chest pain. A stress test was low-risk. You were treated with maalox and aspirin. Continue all of your home medicaitons. You need to see your regular doctor in 2-3 days to be checked. You may also follow up with cardiology (Dr Tavon Nix) in about a week. You should return to the emergency department if your symptoms worsen or do not resolve. In addition, return if:  - You have a fever (greater than 101 degrees)  - You have new/worsened chest pain, shortness of breath, abdominal pain, or uncontrollable vomiting  - You are unable to eat or drink  - You pass out  - You have difficulty moving your arms or legs   - You have difficulty speaking or slurred speech  - Or you have any concern that you feel needs acute physician evaluation.

## 2023-05-23 NOTE — ED PROVIDER NOTES
[address] [facility] [address] ENCOUNTER          ATTENDING PHYSICIAN NOTE       Date of evaluation: 5/22/2023    ADDENDUM:      Care of this patient was assumed from the offgoing provider Dr Carlos Pan  The patient was seen for Chest Pain  . The patient's initial evaluation and plan have been discussed with the prior provider who initially evaluated the patient. Nursing Notes, Past Medical Hx, Past Surgical Hx, Social Hx, Allergies, and Family Hx were all reviewed. ASSESSMENT / PLAN  (Chava Number)     Jose Jean is a 76 y.o. female with chest pain  On my reassessment, she is asymptomatic. DUring her ED course, she had reassuring imaging:  NM Cardiac Stress Test Nuclear Imaging   Final Result      CTA CHEST ABDOMEN PELVIS W CONTRAST   Final Result   1. No acute vascular abnormality in the chest, abdomen, or pelvis. 2.Small hiatal hernia. 3. No acute cardiopulmonary abnormality. 4. No acute abdominal or pelvic abnormality. 5. Scattered diverticulosis without diverticulitis. Electronically signed by Nakia Davis MD      XR CHEST (2 VW)   Final Result   1. No acute disease. I discussed the hiatal hernia finding and recommended GI followup; she has a GI doctor. I discussed the low-risk stress test with her, and given resolution of her symptoms, she is a good candidate for outpatient follow-up. Referral to Dr Tavon Nix given although she has an [facility] cardiologist and she was encouraged to follow-up with either    I given opportunity for questions to be answered. On my exam, she is awake alert conversant. Her lungs are clear to auscultation bilaterally. Her rhythm is regular and her rate is normal and there is no cardiac murmur on auscultation. She has no aphasia or dysarthria. Is this patient to be included in the SEP-1 core measure due to severe sepsis or septic shock?  No Exclusion criteria - the patient is NOT to be included for SEP-1 Core Measure due

## 2023-05-23 NOTE — ED TRIAGE NOTES
65y/o female presents to the ED with midsternal c/p onset this morning. Pt states pain is intermittent. -sob +tingling/numbness that's chronic. -n/v/f/c/d.

## 2023-06-05 ENCOUNTER — NURSE ONLY (OUTPATIENT)
Dept: CARDIOLOGY CLINIC | Age: 78
End: 2023-06-05

## 2023-06-05 DIAGNOSIS — Z95.818 STATUS POST PLACEMENT OF IMPLANTABLE LOOP RECORDER: Primary | ICD-10-CM

## 2023-06-05 DIAGNOSIS — I63.439 CEREBROVASCULAR ACCIDENT (CVA) DUE TO EMBOLISM OF POSTERIOR CEREBRAL ARTERY WITH INFARCTIONS OF BOTH OCCIPITAL LOBES (HCC): ICD-10-CM

## 2023-06-10 NOTE — PROGRESS NOTES
ILR implanted for stroke. No AF recorded to date. 6/21/20-symptomatic pause recorded showing high grade AVB-OV 7/7/20 addressed pause. Remote transmission received from patient's ILR monitor at home. Remote Linq report shows no arrhythmias. End of 31-day monitoring period 6/5/23. EP physician to review. See PACEART report under Cardiology tab. Will continue to monitor remotely.

## 2023-06-20 LAB
EKG ATRIAL RATE: 60 BPM
EKG DIAGNOSIS: NORMAL
EKG P AXIS: 60 DEGREES
EKG P-R INTERVAL: 188 MS
EKG Q-T INTERVAL: 456 MS
EKG QRS DURATION: 82 MS
EKG QTC CALCULATION (BAZETT): 456 MS
EKG R AXIS: 3 DEGREES
EKG T AXIS: 28 DEGREES
EKG VENTRICULAR RATE: 60 BPM

## 2023-07-06 ENCOUNTER — NURSE ONLY (OUTPATIENT)
Dept: CARDIOLOGY CLINIC | Age: 78
End: 2023-07-06
Payer: MEDICARE

## 2023-07-06 DIAGNOSIS — I63.439 CEREBROVASCULAR ACCIDENT (CVA) DUE TO EMBOLISM OF POSTERIOR CEREBRAL ARTERY WITH INFARCTIONS OF BOTH OCCIPITAL LOBES (HCC): ICD-10-CM

## 2023-07-06 DIAGNOSIS — Z45.09 ENCOUNTER FOR LOOP RECORDER CHECK: Primary | ICD-10-CM

## 2023-07-06 PROCEDURE — 93298 REM INTERROG DEV EVAL SCRMS: CPT | Performed by: INTERNAL MEDICINE

## 2023-07-06 PROCEDURE — G2066 INTER DEVC REMOTE 30D: HCPCS | Performed by: INTERNAL MEDICINE

## 2023-07-14 NOTE — PROGRESS NOTES
[facility]   Electrophysiology Follow up     Date: 7/14/2023  I had the privilege of visiting Savannah Mckeon in the office. CC: ILR    HPI: Savannah Mckeon is a 76 y.o. female history of hypertension, CVA in 2018, presented in 2019 with acute onset strokelike symptoms, MRI confirmed right flank infarct, CTA head and neck without significant disease, 30-day monitor initially showed no atrial fibrillation or atrial flutter, underwent ILR 1218/2019, this has also failed to show atrial fibrillation or atrial flutter    Mrs. Walter Woodward presents today for follow-up today she has no complaints of palpitations or racing heart. After a short discussion of indication for her implantable loop recorder she wished to proceed with reimplantation and in fact would like it removed. She had no complications medically pain tenderness at the implantation site. Review of System:  [x] Full ROS obtained and negative except as mentioned in HPI      Prior to Admission medications    Medication Sig Start Date End Date Taking?  Authorizing Provider   amLODIPine (NORVASC) 5 MG tablet TAKE ONE TABLET BY MOUTH DAILY 3/24/23   JULES Segovia - CNP   atorvastatin (LIPITOR) 40 MG tablet TAKE ONE TABLET BY MOUTH ONCE NIGHTLY 4/11/23   Lukas Neri MD   sertraline (ZOLOFT) 50 MG tablet TAKE ONE TABLET BY MOUTH DAILY 2/24/23   Lukas Neri MD   anastrozole (ARIMIDEX) 1 MG tablet TAKE 1 TABLET BY MOUTH  DAILY 12/15/22   Lukas Neri MD   calcitonin (MIACALCIN) 200 UNIT/ACT nasal spray USE 1 SPRAY IN NOSE EVERY DAY  Patient not taking: No sig reported 9/5/21   Lukas Neri MD   acetaminophen (TYLENOL) 500 MG tablet Take 500 mg by mouth every 4 hours as needed for Pain    Historical Provider, MD   omeprazole (PRILOSEC) 10 MG delayed release capsule Take 1 capsule by mouth once daily 12/4/20   Lukas Neri MD   calcium carbonate 600 MG TABS tablet Take 1 tablet by mouth daily    Historical

## 2023-07-14 NOTE — H&P (VIEW-ONLY)
[facility]   Electrophysiology Follow up     Date: 7/14/2023  I had the privilege of visiting Emily Neves in the office. CC: ILR    HPI: Emily Neves is a 76 y.o. female history of hypertension, CVA in 2018, presented in 2019 with acute onset strokelike symptoms, MRI confirmed right flank infarct, CTA head and neck without significant disease, 30-day monitor initially showed no atrial fibrillation or atrial flutter, underwent ILR 1218/2019, this has also failed to show atrial fibrillation or atrial flutter    Mrs. Rollin Cockayne presents today for follow-up today she has no complaints of palpitations or racing heart. After a short discussion of indication for her implantable loop recorder she wished to proceed with reimplantation and in fact would like it removed. She had no complications medically pain tenderness at the implantation site. Review of System:  [x] Full ROS obtained and negative except as mentioned in HPI      Prior to Admission medications    Medication Sig Start Date End Date Taking?  Authorizing Provider   amLODIPine (NORVASC) 5 MG tablet TAKE ONE TABLET BY MOUTH DAILY 3/24/23   JULES Magdaleno - CNP   atorvastatin (LIPITOR) 40 MG tablet TAKE ONE TABLET BY MOUTH ONCE NIGHTLY 4/11/23   Bryce Gutierrez MD   sertraline (ZOLOFT) 50 MG tablet TAKE ONE TABLET BY MOUTH DAILY 2/24/23   Bryce Gutierrez MD   anastrozole (ARIMIDEX) 1 MG tablet TAKE 1 TABLET BY MOUTH  DAILY 12/15/22   Bryce Gutierrez MD   calcitonin (MIACALCIN) 200 UNIT/ACT nasal spray USE 1 SPRAY IN NOSE EVERY DAY  Patient not taking: No sig reported 9/5/21   Bryce Gutierrez MD   acetaminophen (TYLENOL) 500 MG tablet Take 500 mg by mouth every 4 hours as needed for Pain    Historical Provider, MD   omeprazole (PRILOSEC) 10 MG delayed release capsule Take 1 capsule by mouth once daily 12/4/20   Bryce Gutierrez MD   calcium carbonate 600 MG TABS tablet Take 1 tablet by mouth daily    Historical

## 2023-07-20 ENCOUNTER — TELEPHONE (OUTPATIENT)
Dept: CARDIOLOGY CLINIC | Age: 78
End: 2023-07-20

## 2023-07-20 ENCOUNTER — OFFICE VISIT (OUTPATIENT)
Dept: CARDIOLOGY CLINIC | Age: 78
End: 2023-07-20
Payer: MEDICARE

## 2023-07-20 ENCOUNTER — NURSE ONLY (OUTPATIENT)
Dept: CARDIOLOGY CLINIC | Age: 78
End: 2023-07-20

## 2023-07-20 VITALS
BODY MASS INDEX: 25.34 KG/M2 | HEIGHT: 63 IN | DIASTOLIC BLOOD PRESSURE: 60 MMHG | WEIGHT: 143 LBS | SYSTOLIC BLOOD PRESSURE: 122 MMHG | HEART RATE: 64 BPM

## 2023-07-20 DIAGNOSIS — Z45.09 ENCOUNTER FOR LOOP RECORDER CHECK: ICD-10-CM

## 2023-07-20 DIAGNOSIS — I63.439 CEREBROVASCULAR ACCIDENT (CVA) DUE TO EMBOLISM OF POSTERIOR CEREBRAL ARTERY WITH INFARCTIONS OF BOTH OCCIPITAL LOBES (HCC): Primary | ICD-10-CM

## 2023-07-20 PROCEDURE — 1123F ACP DISCUSS/DSCN MKR DOCD: CPT | Performed by: INTERNAL MEDICINE

## 2023-07-20 PROCEDURE — G8417 CALC BMI ABV UP PARAM F/U: HCPCS | Performed by: INTERNAL MEDICINE

## 2023-07-20 PROCEDURE — G8427 DOCREV CUR MEDS BY ELIG CLIN: HCPCS | Performed by: INTERNAL MEDICINE

## 2023-07-20 PROCEDURE — 93000 ELECTROCARDIOGRAM COMPLETE: CPT | Performed by: INTERNAL MEDICINE

## 2023-07-20 PROCEDURE — 3078F DIAST BP <80 MM HG: CPT | Performed by: INTERNAL MEDICINE

## 2023-07-20 PROCEDURE — 1090F PRES/ABSN URINE INCON ASSESS: CPT | Performed by: INTERNAL MEDICINE

## 2023-07-20 PROCEDURE — G8399 PT W/DXA RESULTS DOCUMENT: HCPCS | Performed by: INTERNAL MEDICINE

## 2023-07-20 PROCEDURE — 1036F TOBACCO NON-USER: CPT | Performed by: INTERNAL MEDICINE

## 2023-07-20 PROCEDURE — 99214 OFFICE O/P EST MOD 30 MIN: CPT | Performed by: INTERNAL MEDICINE

## 2023-07-20 PROCEDURE — 3074F SYST BP LT 130 MM HG: CPT | Performed by: INTERNAL MEDICINE

## 2023-07-20 NOTE — PATIENT INSTRUCTIONS
You are scheduled for a Loop Recorder Removal     Our  will call you to discuss a date for you procedure. The day of your procedure you will need to check in at the registration desk, which is in the main lobby at [facility] will need to fast after midnight . You may take all other medications with a sip of water the morning of your procedure. Please have a responsible adult to drive you home upon discharge. The discharging unit will be giving you discharge instructions. If you have any questions regarding your procedure itself or your medications, please call [phone] and ask to talk to an EP nurse.

## 2023-07-20 NOTE — PROGRESS NOTES
ILR implanted for stroke. No AF recorded to date. 6/21/20-symptomatic pause recorded showing high grade AVB-OV 7/7/20 addressed pause. Remote transmission received from patient's ILR monitor at home. Remote Linq report shows no arrhythmias. Battery RRT since 07.14.23. Pt scheduled to see CMV today. EP physician to review. See PACEART report under Cardiology tab. Will continue to monitor remotely.

## 2023-07-20 NOTE — PROGRESS NOTES
ILR implanted for stroke. No AF recorded to date. 6/21/20-symptomatic pause recorded showing high grade AVB-OV 7/7/20 addressed pause. Remote transmission received from patient's ILR monitor at home. Remote Linq report shows no arrhythmias. End of 31-day monitoring period 7/6/23. EP physician to review. See PACEART report under Cardiology tab. Will continue to monitor remotely.

## 2023-07-20 NOTE — TELEPHONE ENCOUNTER
Spoke with Iveth JESUS, per insurance requirement, will aparna 5-7 days for authorization, unable to undergo removal tomorrow.      LMOM for sister to return call in regards to this message

## 2023-07-24 NOTE — TELEPHONE ENCOUNTER
Spoke with the patient and got her scheduled for procedure. We went over instructions below and she verbalized understanding. She needs and wants to add her cell phone # but it was wrong and doesn't know how to get it from phone may need help on next visit to add. Procedure -Loop Recorder Removal   Date: 8/4/2023  Arrival time: 10:00 am   Procedure time:12:00 pm      Our  will call you to discuss a date for you procedure. The day of your procedure you will need to check in at the registration desk, which is in the main lobby at The [address] will need to fast after midnight . You may take all other medications with a sip of water the morning of your procedure. Please have a responsible adult to drive you home upon discharge. The discharging unit will be giving you discharge instructions. If you have any questions regarding your procedure itself or your medications, please call [phone] and ask to talk to an EP nurse.       Qgenda update  / emailed cath lab form

## 2023-08-03 ENCOUNTER — PRE-PROCEDURE TELEPHONE (OUTPATIENT)
Dept: CARDIAC CATH/INVASIVE PROCEDURES | Age: 78
End: 2023-08-03

## 2023-08-03 NOTE — PROGRESS NOTES
Called patient about procedure. Told to be here at 1030 for procedure at 1200. Must be NPO after midnight but can take morning medication with sips of water. Patient is not on a blood thinner. Told to have a responsible adult with them to take them home and stay with them afterwards, if they do not get admitted to hospital. Also, to bring a current list of medications. No other questions or concerns.

## 2023-08-04 ENCOUNTER — HOSPITAL ENCOUNTER (OUTPATIENT)
Dept: CARDIAC CATH/INVASIVE PROCEDURES | Age: 78
End: 2023-08-04
Payer: MEDICARE

## 2023-08-04 VITALS — TEMPERATURE: 98 F | BODY MASS INDEX: 25.34 KG/M2 | WEIGHT: 143 LBS | HEIGHT: 63 IN

## 2023-08-04 PROCEDURE — 33286 RMVL SUBQ CAR RHYTHM MNTR: CPT | Performed by: INTERNAL MEDICINE

## 2023-08-04 PROCEDURE — 33286 RMVL SUBQ CAR RHYTHM MNTR: CPT

## 2023-08-04 NOTE — INTERVAL H&P NOTE
Update History & Physical    The patient's History and Physical of July 14, was reviewed with the patient and I examined the patient. There was no change. The surgical site was confirmed by the patient and me. Plan: The risks, benefits, expected outcome, and alternative to the recommended procedure have been discussed with the patient. Patient understands and wants to proceed with the procedure.      Electronically signed by Ping Hidalgo MD on 8/4/2023 at 11:50 AM

## 2023-08-04 NOTE — PROCEDURES
[facility]     Electrophysiology Procedure Note       Date of Procedure: 8/4/2023  Patient's Name: Laura Pacheco  YOB: 1945   Medical Record Number: [id number]  Procedure Performed by: Leidy Cardona MD.,    Procedures performed:    Loop recorder implantation    Indication of the procedure: Syncope, Palpitation   Laura Pacheco is a 76 y.o. female with who had ILR implantation performed in 2019 in setting of acute stroke. Her device reached end of service. He does not want to have a new ILR implanted. Today, she is here for previously scheduled ILR explantation. Because the patient is at risk of having atrial dysrhythmia, particularly atrial fibrillation, the decision was made to undergo a procedure that would afford long term rhythm detection. Therefore, the patient has been scheduled to undergo an ILR implantation. Details of procedure:   Procedure's risks, benefits and alternatives of procedure were explained to patient. All questions were answered. Patient understood and informed consent was obtained. The patient was brought to the holding bay in a fasting nonsedated state. Patient was prepped and draped in usual sterile fashion. No moderate sedation (conscious sedation) nor general anesthesia was administered or utilized for this procedure. The patient was monitored continuously with ECG, pulse oximetry, blood pressure monitoring, and direct observation. After injection of 0.5% bupivacaine/lidocaine mixture over the previously placed ILR, a small incision was made using a 10 blade using a hemostat, previously implanted ILR was explanted without any difficulty. Hemostasis was obtained by manual pressure. Surgical glue was used. The surgical scar was taped with Steri-strips and manual pressure was held over the taped area to achieve hemostasis. The patient tolerated the procedure well without any complications.     Impression:    Successful explantation of previously

## 2023-08-04 NOTE — DISCHARGE INSTRUCTIONS
Loop Removal Discharge Instructions    DO NOT GET DRESSING WET    NO SHOWERING FOR 7 DAYS - SPONGE BATH ONLY    Follow up with Dr. Robert Persons as directed  Call [phone] or cardiology office with any questions or concerns.

## 2023-08-08 NOTE — PROGRESS NOTES
1 week wound check s/p ILR explant by  [phone]. Dressing removed from 4th ICS left chest device site. Incision is well approximated, CDI, SS. Reviewed post op wound care and restrictions. Pt informed to call office if she develops any fever, chills, increased swelling, redness or drainage from site. Pt voiced an understanding. Discontinued in [address] [place] [address] Court. Patient will follow up as instructed.

## 2023-08-09 ENCOUNTER — NURSE ONLY (OUTPATIENT)
Dept: CARDIOLOGY CLINIC | Age: 78
End: 2023-08-09

## 2023-10-16 NOTE — PROGRESS NOTES
[facility]   Electrophysiology Follow up     Date: 10/17/2023  I had the privilege of visiting Lis Rachel in the office. CC: Follow up ILR    HPI: Lis Rachel is a 78 y.o. female history of hypertension, CVA in 2018, acute lacunar infarct involving the lateral aspect of the right thalamus and posterior limb of the right internal capsule in 2019, 30-day monitor which did not show to dysrhythmia, underwent ILR implant 12/17/2019, failed to show atrial dysrhythmia, device EOS. Was seen in electrophysiology clinic, discussed management of her device, role of continued monitoring with reimplantation, patient does not want to proceed with the implant and instead wanted to leave remove device. Patient underwent ILR explant 8/4/2023. Today, Lis Rachel presents for follow-up and management of cryptogenic stroke. She had no complaints following her device explant. She denies palpitations, racing heart, dizziness, chest pain, shortness of breath, orthopnea/PND or syncope. Review of System:  [x] Full ROS obtained and negative except as mentioned in HPI      Prior to Admission medications    Medication Sig Start Date End Date Taking? Authorizing Provider   atorvastatin (LIPITOR) 40 MG tablet TAKE ONE TABLET BY MOUTH ONCE NIGHTLY 4/11/23  Yes Emma Kiser MD   amLODIPine (NORVASC) 5 MG tablet TAKE ONE TABLET BY MOUTH DAILY 3/24/23  Yes Nicole Velazquez, APRN - CNP   sertraline (ZOLOFT) 50 MG tablet TAKE ONE TABLET BY MOUTH DAILY 2/24/23  Yes Emma Kiser MD   acetaminophen (TYLENOL) 500 MG tablet Take 1 tablet by mouth every 4 hours as needed for Pain   Yes ProviderAndres MD   omeprazole (PRILOSEC) 10 MG delayed release capsule Take 1 capsule by mouth once daily 12/4/20  Yes Emma Kiser MD   calcium carbonate 600 MG TABS tablet Take 1 tablet by mouth daily   Yes ProviderAndres MD   Multiple Vitamin (MULTIVITAMIN PO) Take  by mouth.    Yes Arnulfo Mosley,
no murmurs, no lower extremity swelling, no jugular venous distention, low implant ILR beneath left breast, nontender to palpation, no surrounding swelling, erythema or ecchymoses  Abdomen is rounded, soft, nontender  Strength is grossly preserved, normal gait, normal tone  No focal neurologic deficits  Appropriate mood and affect  No rashes or ecchymoses      Labs:  Lab Results   Component Value Date    ALT 22 05/23/2023    AST 20 05/23/2023     05/23/2023    K 3.4 (L) 05/23/2023    HGB 14.5 05/23/2023     05/23/2023    CREATININE 0.6 05/23/2023    BUN 17 05/23/2023    INR 0.96 09/06/2019       Imaging:  ECG 10/13/23  Sinus rhythm    Stress Test 05/23/23   Summary   There is normal isotope uptake at stress and rest. There is no evidence of   myocardial ischemia or scar. Normal LV size and systolic function. Left ventricular ejection fraction of 76 %. There are no regional wall motion abnormalities. Overall findings represent a low risk scan    Echo 09/09/19   Summary   Left ventricular cavity size is decreased. There is mild left ventricular   hypertrophy. Overall left ventricular systolic function appears normal with   an ejection fraction of 60-65%. No regional wall motion abnormalities are   noted. Indeterminate diastolic function. Mild tricuspid regurgitation. Estimated pulmonary artery systolic pressure is at 29 mmHg assuming a right   atrial pressure of 3 mmHg.  A bubble study was performed and fails to show   evidence of right to left shunting    Assessment/Plan:     CVA   - 2018 and 2019  - s/p ILR 12/18/19   - Loop nearing end-of-life, reviewed interrogation, no atrial elation atrial flutter, following long discussion of prior CVA, indication for long-term monitoring of atrial dysrhythmia that may explain stroke, patient would like to proceed with loop explant as soon as possible and cannot proceed with reimplantation  - Schedule for loop explant soon as possible, any

## 2023-10-17 ENCOUNTER — OFFICE VISIT (OUTPATIENT)
Dept: CARDIOLOGY CLINIC | Age: 78
End: 2023-10-17
Payer: MEDICARE

## 2023-10-17 VITALS
DIASTOLIC BLOOD PRESSURE: 68 MMHG | SYSTOLIC BLOOD PRESSURE: 130 MMHG | BODY MASS INDEX: 25.51 KG/M2 | HEART RATE: 66 BPM | WEIGHT: 144 LBS

## 2023-10-17 DIAGNOSIS — I63.439 CEREBROVASCULAR ACCIDENT (CVA) DUE TO EMBOLISM OF POSTERIOR CEREBRAL ARTERY WITH INFARCTIONS OF BOTH OCCIPITAL LOBES (HCC): Primary | ICD-10-CM

## 2023-10-17 DIAGNOSIS — I10 BENIGN ESSENTIAL HTN: ICD-10-CM

## 2023-10-17 PROCEDURE — 1036F TOBACCO NON-USER: CPT | Performed by: INTERNAL MEDICINE

## 2023-10-17 PROCEDURE — 3078F DIAST BP <80 MM HG: CPT | Performed by: INTERNAL MEDICINE

## 2023-10-17 PROCEDURE — 99214 OFFICE O/P EST MOD 30 MIN: CPT | Performed by: INTERNAL MEDICINE

## 2023-10-17 PROCEDURE — 93000 ELECTROCARDIOGRAM COMPLETE: CPT | Performed by: INTERNAL MEDICINE

## 2023-10-17 PROCEDURE — G8417 CALC BMI ABV UP PARAM F/U: HCPCS | Performed by: INTERNAL MEDICINE

## 2023-10-17 PROCEDURE — 1090F PRES/ABSN URINE INCON ASSESS: CPT | Performed by: INTERNAL MEDICINE

## 2023-10-17 PROCEDURE — G8427 DOCREV CUR MEDS BY ELIG CLIN: HCPCS | Performed by: INTERNAL MEDICINE

## 2023-10-17 PROCEDURE — G8484 FLU IMMUNIZE NO ADMIN: HCPCS | Performed by: INTERNAL MEDICINE

## 2023-10-17 PROCEDURE — 3074F SYST BP LT 130 MM HG: CPT | Performed by: INTERNAL MEDICINE

## 2023-10-17 PROCEDURE — 1123F ACP DISCUSS/DSCN MKR DOCD: CPT | Performed by: INTERNAL MEDICINE

## 2023-10-17 PROCEDURE — G8399 PT W/DXA RESULTS DOCUMENT: HCPCS | Performed by: INTERNAL MEDICINE

## 2023-12-17 RX ORDER — ANASTROZOLE 1 MG/1
1 TABLET ORAL DAILY
Qty: 100 TABLET | Refills: 2 | Status: SHIPPED | OUTPATIENT
Start: 2023-12-17

## 2023-12-28 ENCOUNTER — HOSPITAL ENCOUNTER (OUTPATIENT)
Dept: MAMMOGRAPHY | Age: 78
Discharge: HOME | End: 2023-12-28
Payer: MEDICARE

## 2023-12-28 VITALS — WEIGHT: 140 LBS | BODY MASS INDEX: 24.8 KG/M2 | HEIGHT: 63 IN

## 2023-12-28 DIAGNOSIS — Z12.31 VISIT FOR SCREENING MAMMOGRAM: ICD-10-CM

## 2023-12-28 PROCEDURE — 77063 BREAST TOMOSYNTHESIS BI: CPT

## 2024-05-26 RX ORDER — AMLODIPINE BESYLATE 5 MG/1
TABLET ORAL
Qty: 90 TABLET | Refills: 3 | Status: SHIPPED | OUTPATIENT
Start: 2024-05-26

## 2024-07-08 ENCOUNTER — OFFICE VISIT (OUTPATIENT)
Dept: FAMILY MEDICINE CLINIC | Age: 79
End: 2024-07-08
Payer: MEDICARE

## 2024-07-08 VITALS
SYSTOLIC BLOOD PRESSURE: 130 MMHG | TEMPERATURE: 96.9 F | HEART RATE: 114 BPM | OXYGEN SATURATION: 94 % | DIASTOLIC BLOOD PRESSURE: 66 MMHG | BODY MASS INDEX: 25.69 KG/M2 | WEIGHT: 145 LBS

## 2024-07-08 DIAGNOSIS — H91.93 BILATERAL HEARING LOSS, UNSPECIFIED HEARING LOSS TYPE: Primary | ICD-10-CM

## 2024-07-08 DIAGNOSIS — I63.439 CEREBROVASCULAR ACCIDENT (CVA) DUE TO EMBOLISM OF POSTERIOR CEREBRAL ARTERY WITH INFARCTIONS OF BOTH OCCIPITAL LOBES (HCC): ICD-10-CM

## 2024-07-08 DIAGNOSIS — H61.22 CERUMEN DEBRIS ON TYMPANIC MEMBRANE OF LEFT EAR: ICD-10-CM

## 2024-07-08 PROCEDURE — 3078F DIAST BP <80 MM HG: CPT | Performed by: NURSE PRACTITIONER

## 2024-07-08 PROCEDURE — 1123F ACP DISCUSS/DSCN MKR DOCD: CPT | Performed by: NURSE PRACTITIONER

## 2024-07-08 PROCEDURE — 69209 REMOVE IMPACTED EAR WAX UNI: CPT | Performed by: NURSE PRACTITIONER

## 2024-07-08 PROCEDURE — 3075F SYST BP GE 130 - 139MM HG: CPT | Performed by: NURSE PRACTITIONER

## 2024-07-08 PROCEDURE — 99213 OFFICE O/P EST LOW 20 MIN: CPT | Performed by: NURSE PRACTITIONER

## 2024-07-08 SDOH — ECONOMIC STABILITY: FOOD INSECURITY: WITHIN THE PAST 12 MONTHS, THE FOOD YOU BOUGHT JUST DIDN'T LAST AND YOU DIDN'T HAVE MONEY TO GET MORE.: NEVER TRUE

## 2024-07-08 SDOH — ECONOMIC STABILITY: INCOME INSECURITY: HOW HARD IS IT FOR YOU TO PAY FOR THE VERY BASICS LIKE FOOD, HOUSING, MEDICAL CARE, AND HEATING?: NOT HARD AT ALL

## 2024-07-08 SDOH — ECONOMIC STABILITY: FOOD INSECURITY: WITHIN THE PAST 12 MONTHS, YOU WORRIED THAT YOUR FOOD WOULD RUN OUT BEFORE YOU GOT MONEY TO BUY MORE.: NEVER TRUE

## 2024-07-08 ASSESSMENT — PATIENT HEALTH QUESTIONNAIRE - PHQ9
SUM OF ALL RESPONSES TO PHQ QUESTIONS 1-9: 0
2. FEELING DOWN, DEPRESSED OR HOPELESS: NOT AT ALL
SUM OF ALL RESPONSES TO PHQ QUESTIONS 1-9: 0
5. POOR APPETITE OR OVEREATING: NOT AT ALL
SUM OF ALL RESPONSES TO PHQ9 QUESTIONS 1 & 2: 0
7. TROUBLE CONCENTRATING ON THINGS, SUCH AS READING THE NEWSPAPER OR WATCHING TELEVISION: NOT AT ALL
6. FEELING BAD ABOUT YOURSELF - OR THAT YOU ARE A FAILURE OR HAVE LET YOURSELF OR YOUR FAMILY DOWN: NOT AT ALL
3. TROUBLE FALLING OR STAYING ASLEEP: NOT AT ALL
8. MOVING OR SPEAKING SO SLOWLY THAT OTHER PEOPLE COULD HAVE NOTICED. OR THE OPPOSITE, BEING SO FIGETY OR RESTLESS THAT YOU HAVE BEEN MOVING AROUND A LOT MORE THAN USUAL: NOT AT ALL
SUM OF ALL RESPONSES TO PHQ QUESTIONS 1-9: 0
SUM OF ALL RESPONSES TO PHQ QUESTIONS 1-9: 0
1. LITTLE INTEREST OR PLEASURE IN DOING THINGS: NOT AT ALL
4. FEELING TIRED OR HAVING LITTLE ENERGY: NOT AT ALL
10. IF YOU CHECKED OFF ANY PROBLEMS, HOW DIFFICULT HAVE THESE PROBLEMS MADE IT FOR YOU TO DO YOUR WORK, TAKE CARE OF THINGS AT HOME, OR GET ALONG WITH OTHER PEOPLE: NOT DIFFICULT AT ALL
9. THOUGHTS THAT YOU WOULD BE BETTER OFF DEAD, OR OF HURTING YOURSELF: NOT AT ALL

## 2024-07-08 NOTE — PROGRESS NOTES
Vitamin (MULTIVITAMIN PO) Take  by mouth.       No current facility-administered medications for this visit.     MEDICATION LIST REVIEWED AND UPDATED AT TIME OF VISIT    Review of Systems   All other systems reviewed and are negative.      Vitals:    07/08/24 1437   BP: 130/66   Pulse: (!) 114   Temp: 96.9 °F (36.1 °C)   SpO2: 94%   Weight: 65.8 kg (145 lb)       Physical Exam  Constitutional:       Appearance: Normal appearance. She is well-developed and normal weight.   HENT:      Head: Normocephalic.      Right Ear: Tympanic membrane and ear canal normal.      Left Ear: There is impacted cerumen.      Ears:      [place] exam findings: Lateralizes left.     Comments: TM intact post irrigation.  Hearing significantly improved in left ear.     Mouth/Throat:      Mouth: Mucous membranes are moist.   Eyes:      Pupils: Pupils are equal, round, and reactive to light.   Cardiovascular:      Rate and Rhythm: Normal rate and regular rhythm.      Heart sounds: Normal heart sounds.   Pulmonary:      Effort: Pulmonary effort is normal.      Breath sounds: Normal breath sounds.   Musculoskeletal:         General: Normal range of motion.      Cervical back: Normal range of motion.   Skin:     General: Skin is warm and dry.   Neurological:      Mental Status: She is alert and oriented to person, place, and time.               An electronic signature was used to authenticate this note.    --JULES MORSE - CNP

## 2024-07-08 NOTE — ASSESSMENT & PLAN NOTE
left canal cleared of large amt cerumen after H2O2 dwell and manual extraction with instrumentation.  TM intact with mild erythema and improved hearing.  Canal clear with mild erythema  Patient tolerated procedure well

## 2024-07-29 ENCOUNTER — PROCEDURE VISIT (OUTPATIENT)
Dept: AUDIOLOGY | Age: 79
End: 2024-07-29
Payer: MEDICARE

## 2024-07-29 DIAGNOSIS — H90.3 SENSORINEURAL HEARING LOSS, BILATERAL: Primary | ICD-10-CM

## 2024-07-29 PROCEDURE — V5020 CONFORMITY EVALUATION: HCPCS | Performed by: AUDIOLOGIST

## 2024-07-29 PROCEDURE — 92567 TYMPANOMETRY: CPT | Performed by: AUDIOLOGIST

## 2024-07-29 NOTE — PROGRESS NOTES
Amairani Mauro   1945, 78 y.o. female   [id number]       Referring Provider: Nicole Velazquez APRN - CNP   Referral Type: In an order in Epic    Reason for Visit: Evaluation of suspected change in hearing    ADULT AUDIOLOGIC EVALUATION      Amairani Mauro is a 78 y.o. female seen today, 7/29/2024 , for an initial audiologic evaluation.      AUDIOLOGIC AND OTHER PERTINENT MEDICAL HISTORY:      Amairani Mauro noted a perceived gradual decline in hearing as she notices increased difficulty hearing details. She is unsure if  is not speaking clearly or it is her hearing. Patient also reports family history of hearing loss in sister. Medical history is significant for radiation for breast cancer. No additional significant otologic or medical history was reported.     Amairani Mauro denied otalgia, aural fullness, otorrhea, tinnitus, dizziness, imbalance, history of falls, history of occupational/recreational noise exposure, history of head trauma, and history of ear surgery.    Date: 7/29/2024     IMPRESSIONS:      Today's results revealed a symmetric sensorineural hearing loss with excellent word recognition, bilaterally. Hearing loss significant enough to create hearing difficulty in at least some listening situations. Discussed benefits of amplification.  Patient is scheduled with Yolette Adams for hearing aid evaluation on 8/12/24.     ASSESSMENT AND FINDINGS:     Otoscopy revealed: Clear ear canals bilaterally    RIGHT EAR:  Hearing Sensitivity: Within normal limits through 500Hz sloping to a mild to moderately-severe to severe sensorineural hearing loss.   Speech Recognition Threshold: 25 dB HL  Word Recognition: Excellent 96%, based on NU-6 25-word list at 75 dBHL using recorded speech stimuli.    Tympanometry: Normal peak pressure and compliance, Type A tympanogram, consistent with normal middle ear function.      LEFT EAR:  Hearing Sensitivity: Within normal limits through 500Hz sloping to a mild

## 2024-07-29 NOTE — PATIENT INSTRUCTIONS
into a person’s ear      Some additional items that may be helpful:   - Remain patient - this is important for both parties   - Write down items that still cannot be heard/understood.  You may write with pen/paper or consider typing/texting on a cell phone or smart device.   - If background noise is unavoidable, try to keep yourself in a good position in the room.  By sitting at a plata on the side of the restaurant (preferably a corner), it will be easier to communicate than if you were sitting at a table in the middle with background noise surrounding you.  Keep yourself positioned away from music speakers or heavy foot traffic. Hearing Loss: Care Instructions  Your Care Instructions      Hearing loss is a sudden or slow decrease in how well you hear. It can range from mild to profound. Permanent hearing loss can occur with aging, and it can happen when you are exposed long-term to loud noise. Examples include listening to loud music, riding motorcycles, or being around other loud machines.    Hearing loss can affect your work and home life. It can make you feel lonely or depressed. You may feel that you have lost your independence. But hearing aids and other devices can help you hear better and feel connected to others.    Follow-up care is a key part of your treatment and safety. Be sure to make and go to all appointments, and call your doctor if you are having problems. It's also a good idea to know your test results and keep a list of the medicines you take.    How can you care for yourself at home?  Avoid loud noises whenever possible. This helps keep your hearing from getting worse.  Always wear hearing protection around loud noises.  If appropriate, wear hearing aid(s) as directed.  It is recommended that hearing aids are worn during all waking hours to keep your brain active and give it access to the sounds it is missing.      If you are beginning your process with hearing aid(s), schedule a \"Hearing Aid

## 2024-11-14 ENCOUNTER — OFFICE VISIT (OUTPATIENT)
Dept: FAMILY MEDICINE CLINIC | Age: 79
End: 2024-11-14

## 2024-11-14 VITALS
DIASTOLIC BLOOD PRESSURE: 80 MMHG | SYSTOLIC BLOOD PRESSURE: 130 MMHG | BODY MASS INDEX: 25.98 KG/M2 | OXYGEN SATURATION: 95 % | HEIGHT: 63 IN | WEIGHT: 146.6 LBS | HEART RATE: 81 BPM

## 2024-11-14 DIAGNOSIS — Z00.00 WELL ADULT EXAM: ICD-10-CM

## 2024-11-14 DIAGNOSIS — R10.30 LOWER ABDOMINAL PAIN: ICD-10-CM

## 2024-11-14 DIAGNOSIS — I10 BENIGN ESSENTIAL HTN: ICD-10-CM

## 2024-11-14 DIAGNOSIS — E78.2 MIXED HYPERLIPIDEMIA: ICD-10-CM

## 2024-11-14 DIAGNOSIS — C50.412 MALIGNANT NEOPLASM OF UPPER-OUTER QUADRANT OF LEFT BREAST IN FEMALE, ESTROGEN RECEPTOR POSITIVE (HCC): ICD-10-CM

## 2024-11-14 DIAGNOSIS — G95.9 CERVICAL MYELOPATHY (HCC): ICD-10-CM

## 2024-11-14 DIAGNOSIS — Z00.00 MEDICARE ANNUAL WELLNESS VISIT, SUBSEQUENT: Primary | ICD-10-CM

## 2024-11-14 DIAGNOSIS — H53.40 VISUAL FIELD DEFECT: ICD-10-CM

## 2024-11-14 DIAGNOSIS — I69.854 HEMIPLEGIA AND HEMIPARESIS FOLLOWING OTHER CEREBROVASCULAR DISEASE AFFECTING LEFT NON-DOMINANT SIDE (HCC): ICD-10-CM

## 2024-11-14 DIAGNOSIS — F32.5 MAJOR DEPRESSIVE DISORDER, SINGLE EPISODE, IN FULL REMISSION (HCC): ICD-10-CM

## 2024-11-14 DIAGNOSIS — Z17.0 MALIGNANT NEOPLASM OF UPPER-OUTER QUADRANT OF LEFT BREAST IN FEMALE, ESTROGEN RECEPTOR POSITIVE (HCC): ICD-10-CM

## 2024-11-14 PROBLEM — H61.22 CERUMEN DEBRIS ON TYMPANIC MEMBRANE OF LEFT EAR: Status: RESOLVED | Noted: 2024-07-08 | Resolved: 2024-11-14

## 2024-11-14 ASSESSMENT — PATIENT HEALTH QUESTIONNAIRE - PHQ9
4. FEELING TIRED OR HAVING LITTLE ENERGY: NOT AT ALL
SUM OF ALL RESPONSES TO PHQ QUESTIONS 1-9: 0
8. MOVING OR SPEAKING SO SLOWLY THAT OTHER PEOPLE COULD HAVE NOTICED. OR THE OPPOSITE, BEING SO FIGETY OR RESTLESS THAT YOU HAVE BEEN MOVING AROUND A LOT MORE THAN USUAL: NOT AT ALL
2. FEELING DOWN, DEPRESSED OR HOPELESS: NOT AT ALL
7. TROUBLE CONCENTRATING ON THINGS, SUCH AS READING THE NEWSPAPER OR WATCHING TELEVISION: NOT AT ALL
1. LITTLE INTEREST OR PLEASURE IN DOING THINGS: NOT AT ALL
SUM OF ALL RESPONSES TO PHQ QUESTIONS 1-9: 0
SUM OF ALL RESPONSES TO PHQ QUESTIONS 1-9: 0
3. TROUBLE FALLING OR STAYING ASLEEP: NOT AT ALL
6. FEELING BAD ABOUT YOURSELF - OR THAT YOU ARE A FAILURE OR HAVE LET YOURSELF OR YOUR FAMILY DOWN: NOT AT ALL
SUM OF ALL RESPONSES TO PHQ QUESTIONS 1-9: 0
SUM OF ALL RESPONSES TO PHQ9 QUESTIONS 1 & 2: 0
5. POOR APPETITE OR OVEREATING: NOT AT ALL
9. THOUGHTS THAT YOU WOULD BE BETTER OFF DEAD, OR OF HURTING YOURSELF: NOT AT ALL
10. IF YOU CHECKED OFF ANY PROBLEMS, HOW DIFFICULT HAVE THESE PROBLEMS MADE IT FOR YOU TO DO YOUR WORK, TAKE CARE OF THINGS AT HOME, OR GET ALONG WITH OTHER PEOPLE: NOT DIFFICULT AT ALL

## 2024-11-14 ASSESSMENT — LIFESTYLE VARIABLES
HOW MANY STANDARD DRINKS CONTAINING ALCOHOL DO YOU HAVE ON A TYPICAL DAY: PATIENT DOES NOT DRINK
HOW OFTEN DO YOU HAVE A DRINK CONTAINING ALCOHOL: NEVER

## 2024-11-14 NOTE — PROGRESS NOTES
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)  Assessment & Plan:   Chronic, not at goal (unstable), continue current treatment plan--overall better  Major depressive disorder, single episode, in full remission (HCC)  Malignant neoplasm of upper-outer quadrant of left breast in female, estrogen receptor positive (HCC)  Mixed hyperlipidemia  Assessment & Plan:   Chronic, at goal (stable), continue current treatment plan  Visual field defect  Assessment & Plan:   Chronic, at goal (stable), continue current treatment plan  Lower abdominal pain  Assessment & Plan:   New, not at goal (unstable), changes made today: labs    Recommendations for Preventive Services Due: see orders and patient instructions/AVS.  Recommended screening schedule for the next 5-10 years is provided to the patient in written form: see Patient Instructions/AVS.     No follow-ups on file.     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 The following problems were

## 2024-11-14 NOTE — PATIENT INSTRUCTIONS
independent as possible.  How will a doctor assess your ADLs?  Asking about ADLs is part of a routine health checkup your doctor will likely do as you age. Your health check might be done in a doctor's office, in your home, or at a hospital. The goal is to find out if you are having any problems that could make it hard to care for yourself or that make it unsafe for you to be on your own.  To measure your ADLs, your doctor will ask how hard it is for you to do routine tasks. Your doctor may also want to know if you have changed the way you do a task because of a health problem. Your doctor may watch how you:  Walk back and forth.  Keep your balance while you stand or walk.  Move from sitting to standing or from a bed to a chair.  Button or unbutton a shirt or sweater.  Remove and put on your shoes.  It's common to feel a little worried or anxious if you find you can't do all the things you used to be able to do. Talking with your doctor about ADLs is a way to make sure you're as safe as possible and able to care for yourself as well as you can. You may want to bring a caregiver, friend, or family member to your checkup. They can help you talk to your doctor.  Follow-up care is a key part of your treatment and safety. Be sure to make and go to all appointments, and call your doctor if you are having problems. It's also a good idea to know your test results and keep a list of the medicines you take.  Current as of: October 24, 2023  Content Version: 14.2  © 2024 [employer].   Care instructions adapted under license by [employer]. If you have questions about a medical condition or this instruction, always ask your healthcare professional. [employer] disclaims any warranty or liability for your use of this information.           Eating Healthy Foods: Care Instructions  With every meal, you can make healthy food choices. Try to eat a variety of fruits, vegetables, whole grains, lean proteins,

## 2024-11-15 LAB
ALBUMIN SERPL-MCNC: 4.5 G/DL (ref 3.4–5)
ALBUMIN/GLOB SERPL: 2.1 {RATIO} (ref 1.1–2.2)
ALP SERPL-CCNC: 81 U/L (ref 40–129)
ALT SERPL-CCNC: 18 U/L (ref 10–40)
ANION GAP SERPL CALCULATED.3IONS-SCNC: 12 MMOL/L (ref 3–16)
AST SERPL-CCNC: 23 U/L (ref 15–37)
BASOPHILS # BLD: 0.1 K/UL (ref 0–0.2)
BASOPHILS NFR BLD: 1.2 %
BILIRUB SERPL-MCNC: 0.3 MG/DL (ref 0–1)
BUN SERPL-MCNC: 13 MG/DL (ref 7–20)
CALCIUM SERPL-MCNC: 9.8 MG/DL (ref 8.3–10.6)
CHLORIDE SERPL-SCNC: 105 MMOL/L (ref 99–110)
CHOLEST SERPL-MCNC: 193 MG/DL (ref 0–199)
CO2 SERPL-SCNC: 28 MMOL/L (ref 21–32)
CREAT SERPL-MCNC: 0.7 MG/DL (ref 0.6–1.2)
CRP SERPL-MCNC: 3.8 MG/L (ref 0–5.1)
DEPRECATED RDW RBC AUTO: 12.7 % (ref 12.4–15.4)
EOSINOPHIL # BLD: 0.2 K/UL (ref 0–0.6)
EOSINOPHIL NFR BLD: 2.4 %
GFR SERPLBLD CREATININE-BSD FMLA CKD-EPI: 87 ML/MIN/{1.73_M2}
GLUCOSE SERPL-MCNC: 81 MG/DL (ref 70–99)
HCT VFR BLD AUTO: 45.8 % (ref 36–48)
HDLC SERPL-MCNC: 44 MG/DL (ref 40–60)
HGB BLD-MCNC: 14.9 G/DL (ref 12–16)
LDLC SERPL CALC-MCNC: 111 MG/DL
LYMPHOCYTES # BLD: 2.4 K/UL (ref 1–5.1)
LYMPHOCYTES NFR BLD: 29.6 %
MCH RBC QN AUTO: 30.5 PG (ref 26–34)
MCHC RBC AUTO-ENTMCNC: 32.7 G/DL (ref 31–36)
MCV RBC AUTO: 93.5 FL (ref 80–100)
MONOCYTES # BLD: 0.8 K/UL (ref 0–1.3)
MONOCYTES NFR BLD: 9.3 %
NEUTROPHILS # BLD: 4.7 K/UL (ref 1.7–7.7)
NEUTROPHILS NFR BLD: 57.5 %
PLATELET # BLD AUTO: 288 K/UL (ref 135–450)
PMV BLD AUTO: 8.9 FL (ref 5–10.5)
POTASSIUM SERPL-SCNC: 4.1 MMOL/L (ref 3.5–5.1)
PROT SERPL-MCNC: 6.6 G/DL (ref 6.4–8.2)
RBC # BLD AUTO: 4.9 M/UL (ref 4–5.2)
SODIUM SERPL-SCNC: 145 MMOL/L (ref 136–145)
TRIGL SERPL-MCNC: 192 MG/DL (ref 0–150)
VLDLC SERPL CALC-MCNC: 38 MG/DL
WBC # BLD AUTO: 8.2 K/UL (ref 4–11)

## 2025-01-14 ENCOUNTER — ENROLLMENT (OUTPATIENT)
Dept: PHARMACY | Facility: CLINIC | Age: 80
End: 2025-01-14

## 2025-01-15 ENCOUNTER — TELEPHONE (OUTPATIENT)
Dept: PHARMACY | Facility: CLINIC | Age: 80
End: 2025-01-15

## 2025-01-15 DIAGNOSIS — E28.39 ESTROGEN DEFICIENCY: Primary | ICD-10-CM

## 2025-01-15 NOTE — TELEPHONE ENCOUNTER
Cal Charles MD, please see below - would patient benefit from DEXA due to recent fracture?  Fracture of patella in October  Last DEXA 2021 - osteoporosis; appears hx of alendronate rx, if may re-evaluate for treatment    If appropriate, please order and have your staff notify patient.    Thank you,  Shari Workman, PharmD, [facility]  Population Health Pharmacist  [facility] Clinical Pharmacy  Department, toll free: [phone], option 1    ==================================================================  POPULATION [facility] CLINICAL PHARMACY REVIEW: RECENT FRACTURE    Amairani Mauro is a 79 y.o. old White (non-) female patient who recently had a fracture of right patella (10/1/24 CareEverywhere, [place] source)    Lab Results   Component Value Date    VITD25 48.4 02/07/2019      Lab Results   Component Value Date    CALCIUM 9.8 11/14/2024     estimated creatinine clearance is 58 mL/min (based on SCr of 0.7 mg/dL).    DEXA 2/15/2021:  Osteoporosis    FRAX-calculated 10-year fracture probability:   Per WHO calculator: major Osteoporotic = 31% and Hip = 12%    Assessment:   - 79 y.o. female with recent fracture and may benefit from DEXA to assess current BMD  Last DEXA 2021 - osteoporosis  Appears hx alendronate rx @3672-7682, 2848-7090 per chart (@Sept 2009 - May 2012, @Feb 2019-Aug 2019 per reconcile dispense hx)    Considerations:  - Suggest obtaining DEXA

## 2025-01-21 NOTE — TELEPHONE ENCOUNTER
Noted DEXA ordered, thank you for reviewing    =======================================================   For Pharmacy Admin Tracking Only    Program: Pop Health  CPA in place:  No  Recommendation Provided To: Provider: 1 via Note to Provider  Intervention Detail: Lab(s) Ordered  Intervention Accepted By: Provider: 1  Gap Closed?: No   Time Spent (min): 15

## 2025-02-21 ENCOUNTER — TELEPHONE (OUTPATIENT)
Dept: OTHER | Facility: CLINIC | Age: 80
End: 2025-02-21

## 2025-02-28 ENCOUNTER — TELEPHONE (OUTPATIENT)
Dept: OTHER | Facility: CLINIC | Age: 80
End: 2025-02-28

## 2025-03-03 ENCOUNTER — TELEPHONE (OUTPATIENT)
Dept: OTHER | Facility: CLINIC | Age: 80
End: 2025-03-03

## 2025-03-12 ENCOUNTER — HOSPITAL ENCOUNTER (OUTPATIENT)
Dept: MAMMOGRAPHY | Age: 80
Discharge: HOME | End: 2025-03-12
Payer: MEDICARE

## 2025-03-12 ENCOUNTER — RESULTS FOLLOW-UP (OUTPATIENT)
Dept: MAMMOGRAPHY | Age: 80
End: 2025-03-12

## 2025-03-12 VITALS — HEIGHT: 63 IN | WEIGHT: 147 LBS | BODY MASS INDEX: 26.05 KG/M2

## 2025-03-12 DIAGNOSIS — Z12.31 VISIT FOR SCREENING MAMMOGRAM: ICD-10-CM

## 2025-03-12 PROCEDURE — 77067 SCR MAMMO BI INCL CAD: CPT

## 2025-03-13 ENCOUNTER — TELEPHONE (OUTPATIENT)
Dept: FAMILY MEDICINE CLINIC | Age: 80
End: 2025-03-13

## 2025-03-13 NOTE — TELEPHONE ENCOUNTER
[phone]    Patient's daughter wanted to inform Dr. Charles that they are initiating a claim with the patient's long-term care insurance.    They live at the [facility]. The new care being initiated is for medication assistance, bathing assistance, and light housekeeping. The claim is being sent through [facility].     Sending back to Dr. Charles / clinical staff as an FYI per patient request.

## 2025-03-17 ENCOUNTER — TELEPHONE (OUTPATIENT)
Dept: FAMILY MEDICINE CLINIC | Age: 80
End: 2025-03-17

## 2025-03-17 NOTE — TELEPHONE ENCOUNTER
WHEN DID YOU TEST FOR COVID?  This morning 3/17/25  WAS IT POSITIVE OR NEGATIVE?  Positive   WHEN DID YOUR SYMPTOMS BEGIN?  Saturday   HAVE YOU OR DO YOU HAVE A FEVER?   Slight fever   DO YOU HAVE DIARRHEA?  Yes   ARE YOU VOMITING?   no  ARE YOU FATIGUE?  Yes   HAVE YOU HAD A HEADACHE?  Yes   DO YOU HAVE ANY BODYACHES?  Yes   DO YOU HAVE CHILLS?  No   DO YOU HAVE ANY SHORTNESS OF BREATH?  No   DO YOU HAVE ANY CHEST PAIN OR CHEST TIGHTNESS?  No   DO YOU HAVE ANY CHEST CONGESTION OR NASAL CONGESTION?  (WHICH ONE OR BOTH)  A little nasal congestion   DO YOU HAVE A SORE THROAT?  Yes   DO YOU HAVE A COUGH?   IS IT DRY OR WET, IF WET WHAT COLOR IS IT?  No   WHAT IS YOUR OXYGEN LEVEL IF YOU HAVE A PULSE OXIMETER?  (IF ABLE)   Unable     Pt stated the nurse at [facility] stated they need paxlovid sent in for the pt and her  also sending husbands symptoms back as well.

## 2025-03-17 NOTE — TELEPHONE ENCOUNTER
Pt daughter is calling stating her mom does not feel well and the nurse at the facility the pt lives in stated she needs paxlovid. Please let daughter know when a medication has been sent in.     Brenda Krause:   [phone]